# Patient Record
Sex: FEMALE | Race: WHITE | NOT HISPANIC OR LATINO | Employment: FULL TIME | URBAN - METROPOLITAN AREA
[De-identification: names, ages, dates, MRNs, and addresses within clinical notes are randomized per-mention and may not be internally consistent; named-entity substitution may affect disease eponyms.]

---

## 2017-05-17 ENCOUNTER — OFFICE VISIT (OUTPATIENT)
Dept: FAMILY MEDICINE | Facility: CLINIC | Age: 37
End: 2017-05-17
Payer: COMMERCIAL

## 2017-05-17 VITALS
WEIGHT: 173 LBS | HEART RATE: 78 BPM | TEMPERATURE: 98.5 F | HEIGHT: 67 IN | OXYGEN SATURATION: 98 % | BODY MASS INDEX: 27.15 KG/M2 | DIASTOLIC BLOOD PRESSURE: 74 MMHG | SYSTOLIC BLOOD PRESSURE: 108 MMHG

## 2017-05-17 DIAGNOSIS — R07.81 RIB PAIN ON LEFT SIDE: Primary | ICD-10-CM

## 2017-05-17 PROCEDURE — 99213 OFFICE O/P EST LOW 20 MIN: CPT | Performed by: PHYSICIAN ASSISTANT

## 2017-05-17 NOTE — NURSING NOTE
"Chief Complaint   Patient presents with     Rib Pain       Initial /74 (BP Location: Right arm, Cuff Size: Adult Regular)  Pulse 78  Temp 98.5  F (36.9  C) (Oral)  Ht 5' 7\" (1.702 m)  Wt 173 lb (78.5 kg)  SpO2 98%  Breastfeeding? No  BMI 27.1 kg/m2 Estimated body mass index is 27.1 kg/(m^2) as calculated from the following:    Height as of this encounter: 5' 7\" (1.702 m).    Weight as of this encounter: 173 lb (78.5 kg).  Medication Reconciliation: complete    "

## 2017-05-17 NOTE — PATIENT INSTRUCTIONS
Unclear if this is just a flare from previous versus new injury given period of wellness for 6 months.  Given recurrence, let's have you RTC for CPE for preventative health labs.  Will also complete additional labs for calcium, vitamin D and TSH at follow-up.    Electronically Signed By: Carmen King PA-C

## 2017-05-17 NOTE — PROGRESS NOTES
SUBJECTIVE:                                                    Julisa Tillman is a 37 year old female who presents to clinic today for the following health issues:      Concern - re-injured left rib; same spot from 1 yr ago and same feeling. Had rib fx at that time.  Working out and loosing weight. Was doing an UE weight lifting move and began to hurt again after this.   Did have period where she felt totally well for 6 months since last time rib bothered her.  Pain is not constant. At it's worst yesterday and rates this a 7/10.  Was traveling in care for work and feels that jostling in care made it worse.  Main concern is why is this happening again to the same spot.  Does admit she is lactose intolerant so doesn't have a lot of dairy. No multi-vitamin.  Hasn't had calcium or vitamin D levels checked in past.  Daughter was born without a thyroid and pt reports she was encouraged to have this checked, but she hasn't.       Onset: re-injured it Monday night    Description:   History of rib pain on left side and she re-injured it on monday    Intensity: moderate    Progression of Symptoms:  Worsening as the day goes on    Accompanying Signs & Symptoms:  -no swelling or bruising on that side       Previous history of similar problem:       Precipitating factors:   Worsened by: lying on it during the night    Alleviating factors:  Improved by: time       Therapies Tried and outcome: advil      Problem list and histories reviewed & adjusted, as indicated.  Additional history: as documented    Patient Active Problem List   Diagnosis     Smoker     Moderate recurrent major depression (H)     History reviewed. No pertinent surgical history.    Social History   Substance Use Topics     Smoking status: Current Some Day Smoker     Smokeless tobacco: Not on file     Alcohol use No     History reviewed. No pertinent family history.      Current Outpatient Prescriptions   Medication Sig Dispense Refill     escitalopram  "(LEXAPRO) 20 MG tablet Take 1 tablet (20 mg) by mouth daily 90 tablet 1     No Known Allergies    Reviewed and updated as needed this visit by clinical staff       Reviewed and updated as needed this visit by Provider         ROS:  Constitutional, HEENT, cardiovascular, pulmonary, MSK, integumentary systems are negative, except as otherwise noted.    OBJECTIVE:                                                    /74 (BP Location: Right arm, Cuff Size: Adult Regular)  Pulse 78  Temp 98.5  F (36.9  C) (Oral)  Ht 5' 7\" (1.702 m)  Wt 173 lb (78.5 kg)  SpO2 98%  Breastfeeding? No  BMI 27.1 kg/m2  Body mass index is 27.1 kg/(m^2).  GENERAL: healthy, alert and no distress  EYES: Eyes grossly normal to inspection, PERRL and conjunctivae and sclerae normal  RESP: lungs clear to auscultation - no rales, rhonchi or wheezes  CV: regular rate and rhythm, normal S1 S2, no S3 or S4, no murmur, click or rub, no peripheral edema and peripheral pulses strong  MS: TTP of anterior lateral rib in region of 7th rib consistent with where prior rib fx was noted. Also is at junction of costocartilage and rib.     Diagnostic Test Results:  none      ASSESSMENT/PLAN:                                                        ICD-10-CM    1. Rib pain on left side R07.81    ? New injury versus recurrent costo-chondritis.  No evidence for osteopenia on prior x-ray and was well-healed for 6 months.  Given recurrence though advised CPE and labs for further investigation.  Can also consider ortho referral should this continue to be an issue in the future.  Pt amenable to plan.  See Patient Instructions  Patient Instructions   Unclear if this is just a flare from previous versus new injury given period of wellness for 6 months.  Given recurrence, let's have you RTC for CPE for preventative health labs.  Will also complete additional labs for calcium, vitamin D and TSH at follow-up.    Electronically Signed By: Carmen King, " [place]

## 2017-05-17 NOTE — MR AVS SNAPSHOT
"              After Visit Summary   5/17/2017    Julisa Tillman    MRN: [id number]           Patient Information     Date Of Birth          1980        Visit Information        Provider Department      5/17/2017 3:00 PM Carmen King PA-C [facility]        Care Instructions    Unclear if this is just a flare from previous versus new injury given period of wellness for 6 months.  Given recurrence, let's have you RTC for CPE for preventative health labs.  Will also complete additional labs for calcium, vitamin D and TSH at follow-up.    Electronically Signed By: Carmen King PA-C          Follow-ups after your visit        Who to contact     If you have questions or need follow up information about today's clinic visit or your schedule please contact [facility] directly at [phone].  Normal or non-critical lab and imaging results will be communicated to you by [employer], letter or phone within 4 business days after the clinic has received the results. If you do not hear from us within 7 days, please contact the clinic through [employer] or phone. If you have a critical or abnormal lab result, we will notify you by phone as soon as possible.  Submit refill requests through [employer] or call your pharmacy and they will forward the refill request to us. Please allow 3 business days for your refill to be completed.          Additional Information About Your Visit        MyChart Information     [employer] lets you send messages to your doctor, view your test results, renew your prescriptions, schedule appointments and more. To sign up, go to [email or web address] . Click on \"Log in\" on the left side of the screen, which will take you to the Welcome page. Then click on \"Sign up Now\" on the right side of the page.     You will be asked to enter the access code listed below, as well as some personal information. Please follow the directions to create your username and " "password.     Your access code is: [email or web address]  Expires: 8/15/2017  3:35 PM     Your access code will  in 90 days. If you need help or a new code, please call your [place] clinic or [phone].        Care EveryWhere ID     This is your Care EveryWhere ID. This could be used by other organizations to access your [place] medical records  [id number]        Your Vitals Were     Pulse Temperature Height Pulse Oximetry Breastfeeding? BMI (Body Mass Index)    78 98.5  F (36.9  C) (Oral) 5' 7\" (1.702 m) 98% No 27.1 kg/m2       Blood Pressure from Last 3 Encounters:   17 108/74   16 114/72   16 120/72    Weight from Last 3 Encounters:   17 173 lb (78.5 kg)   16 181 lb (82.1 kg)   16 181 lb (82.1 kg)              Today, you had the following     No orders found for display       Primary Care Provider    [facility]       No address on file        Thank you!     Thank you for choosing [facility]  for your care. Our goal is always to provide you with excellent care. Hearing back from our patients is one way we can continue to improve our services. Please take a few minutes to complete the written survey that you may receive in the mail after your visit with us. Thank you!             Your Updated Medication List - Protect others around you: Learn how to safely use, store and throw away your medicines at www.disposemymeds.org.          This list is accurate as of: 17  3:35 PM.  Always use your most recent med list.                   Brand Name Dispense Instructions for use    escitalopram 20 MG tablet    LEXAPRO    90 tablet    Take 1 tablet (20 mg) by mouth daily         "

## 2017-05-22 ENCOUNTER — OFFICE VISIT (OUTPATIENT)
Dept: ORTHOPEDICS | Facility: CLINIC | Age: 37
End: 2017-05-22
Payer: COMMERCIAL

## 2017-05-22 ENCOUNTER — TELEPHONE (OUTPATIENT)
Dept: FAMILY MEDICINE | Facility: CLINIC | Age: 37
End: 2017-05-22

## 2017-05-22 VITALS
DIASTOLIC BLOOD PRESSURE: 78 MMHG | WEIGHT: 173 LBS | SYSTOLIC BLOOD PRESSURE: 134 MMHG | HEIGHT: 67 IN | BODY MASS INDEX: 27.15 KG/M2

## 2017-05-22 DIAGNOSIS — R07.81 RIB PAIN ON LEFT SIDE: Primary | ICD-10-CM

## 2017-05-22 DIAGNOSIS — M94.0 COSTOCHONDRITIS: Primary | ICD-10-CM

## 2017-05-22 DIAGNOSIS — R07.89 LEFT-SIDED CHEST WALL PAIN: ICD-10-CM

## 2017-05-22 PROCEDURE — 99203 OFFICE O/P NEW LOW 30 MIN: CPT | Performed by: PHYSICAL MEDICINE & REHABILITATION

## 2017-05-22 NOTE — PATIENT INSTRUCTIONS
We addressed the following today:    1. Costochondritis  2. Left-sided chest wall pain    Activity modification as discussed  Physical therapy: [facility] - [phone]  Topical Treatments: Ice  Over the counter medication: Acetaminophen (Tylenol) 1000 mg every 6 hours with food (Maximum of 3000 mg/day)  Ibuprofen (Advil) 800 mg three times a day with food for two weeks  Follow up in 4 weeks if no improvement of symptoms for further evaluation/medical care (sooner if needed; call direct clinic number [[phone]] at any time with questions or concerns)

## 2017-05-22 NOTE — PROGRESS NOTES
" [facility]   Clinic Visit s May 22, 2017    Subjective:  Julisa Tillman is a 37 year old female who is seen in consultation at the request of Carmen King PA-C for evaluation of acute on chronic left anterior rib/chest wall pain.    Symptoms began 1 week ago.  Reports insidious onset without acute precipitating event.  Reports aching, sharp, and pressure that is located left anterior chest wall region with radiation present.  Pain is 9/10 in maximal severity and 7/10 currently.  Symptoms are generally worse with twisting/driving activities, with coughing activities, and with sudden movements and better with laying flat on her back.  Other treatment has consisted of Ibuprofen with minimal relief.  Denies any numbness/tingling/weakness of the upper extremities. Denies any shortness of breath or associated skin changes.  History of fracture to the left 6th rib 1 year ago from coughing.    Patient's past medical, surgical, social, and family histories are reviewed today.  Significant medical history as noted above  No past medical history on file.    Review of Systems:  Constitutional: NEGATIVE for fever, chills, or change in weight  Skin: NEGATIVE for worrisome rashes, moles, or lesions  Neuro: NEGATIVE for weakness of the upper extremities  MSK: see HPI  Additional 10 point ROS is negative other than symptoms noted above and in HPI    Objective:  /78  Ht 5' 7\" (1.702 m)  Wt 173 lb (78.5 kg)  BMI 27.1 kg/m2  General: healthy, alert, and in no distress  Skin: no suspicious lesions or rashes  Psych: mentation appears normal and affect normal/bright  HEENT: no scleral icterus  CV: no pedal edema  Resp: normal respiratory effort without conversational dyspnea   Neuro: motor strength as noted below  Lymph: no palpable lymphadenopathy    MSK:    THORACIC SPINE  Inspection:    No redness, swelling, overlying skin change, or gross deformity/asymmetry  Palpation:    Tender about the " anterior chest wall region  Strength:    5/5 - upper extremities  Special Tests:    None    Imaging:  No x-rays indicated during today's visit  Previous films were reviewed today, independent visualization of images was performed, and results were discussed with the patient  XR RIBS & CHEST LT - 11/14/2016  IMPRESSION:   1. Callus formation in the left sixth rib consistent with an old fracture.   2. No pneumothorax.   3. Chest and left ribs are otherwise normal.    ASSESSMENT:  1. Costochondritis  2. Left-sided chest wall pain    PLAN:  1. Formal physical therapy - exercises to include shoulder mobilization and unrestricted shoulder/pectoral stretching activities with progression to resisted strengthening activities with use of modalities as needed with with home exercise prescription.  2. Activity modification as discussed, including limitation of activities that cause pain/discomfort.  3. Ibuprofen 800 mg 3 times/day for 2 weeks with food for improvement of pain/discomfort.  4. Acetaminophen/ice as needed for improved pain control.  5. Follow-up in 4 weeks if no improvement of symptoms for further evaluation/medical care.  If symptoms resolve completely, can follow-up as needed.  Consider MRI of the chest without contrast, corticosteroid injection with ultrasound guidance, intercostal nerve block, etc as deemed appropriate moving forward. Instructed to contact our office should the condition evolve or worsen, or should any new/progressive neurologic symptoms develop.    Patient's conditions were thoroughly discussed during today's visit with greater than 50% of the visit spent counseling the patient with total time spent face-to-face with the patient being 20 minutes.    Jarrett Dobson DO, CAM  [facility]    Disclaimer: This note consists of symbols derived from keyboarding, dictation and/or voice recognition software. As a result, there may be errors in the script that have gone undetected.  Please consider this when interpreting information found in this chart.

## 2017-05-22 NOTE — MR AVS SNAPSHOT
After Visit Summary   5/22/2017    Julisa Tillman    MRN: [id number]           Patient Information     Date Of Birth          1980        Visit Information        Provider Department      5/22/2017 5:20 PM Jarrett Dobson,  [facility]        Today's Diagnoses     Costochondritis    -  1    Left-sided chest wall pain          Care Instructions    We addressed the following today:    1. Costochondritis  2. Left-sided chest wall pain    Activity modification as discussed  Physical therapy: [facility] - [phone]  Topical Treatments: Ice  Over the counter medication: Acetaminophen (Tylenol) 1000 mg every 6 hours with food (Maximum of 3000 mg/day)  Ibuprofen (Advil) 800 mg three times a day with food for two weeks  Follow up in 4 weeks if no improvement of symptoms for further evaluation/medical care (sooner if needed; call direct clinic number [[phone]] at any time with questions or concerns)            Follow-ups after your visit        Who to contact     If you have questions or need follow up information about today's clinic visit or your schedule please contact [facility] directly at [phone].  Normal or non-critical lab and imaging results will be communicated to you by [email or web address], letter or phone within 4 business days after the clinic has received the results. If you do not hear from us within 7 days, please contact the clinic through [email or web address] or phone. If you have a critical or abnormal lab result, we will notify you by phone as soon as possible.  Submit refill requests through [employer] or call your pharmacy and they will forward the refill request to us. Please allow 3 business days for your refill to be completed.          Additional Information About Your Visit        MyChart Information     [employer] lets you send messages to your doctor, view your test results, renew your prescriptions, schedule appointments and more.  "To sign up, go to [email or web address] . Click on \"Log in\" on the left side of the screen, which will take you to the Welcome page. Then click on \"Sign up Now\" on the right side of the page.     You will be asked to enter the access code listed below, as well as some personal information. Please follow the directions to create your username and password.     Your access code is: [email or web address]  Expires: 8/15/2017  3:35 PM     Your access code will  in 90 days. If you need help or a new code, please call your [place] clinic or [phone].        Care EveryWhere ID     This is your Care EveryWhere ID. This could be used by other organizations to access your [place] medical records  [id number]        Your Vitals Were     Height BMI (Body Mass Index)                5' 7\" (1.702 m) 27.1 kg/m2           Blood Pressure from Last 3 Encounters:   17 134/78   17 108/74   16 114/72    Weight from Last 3 Encounters:   17 173 lb (78.5 kg)   17 173 lb (78.5 kg)   16 181 lb (82.1 kg)              Today, you had the following     No orders found for display       Primary Care Provider    [facility]       No address on file        Thank you!     Thank you for choosing [facility] [place]  for your care. Our goal is always to provide you with excellent care. Hearing back from our patients is one way we can continue to improve our services. Please take a few minutes to complete the written survey that you may receive in the mail after your visit with us. Thank you!             Your Updated Medication List - Protect others around you: Learn how to safely use, store and throw away your medicines at www.disposemymeds.org.          This list is accurate as of: 17  6:05 PM.  Always use your most recent med list.                   Brand Name Dispense Instructions for use    escitalopram 20 MG tablet    LEXAPRO    90 tablet    Take 1 tablet (20 mg) by mouth " daily

## 2017-05-22 NOTE — TELEPHONE ENCOUNTER
In agreement with instructions provided below. Given recurrence and feeling that she is worsening would advise consult with ortho for additional opinion/definitive management. Please notify that referral was placed and they will contact her to schedule an appt.  Electronically Signed By: Carmen King PA-C

## 2017-05-22 NOTE — NURSING NOTE
"Chief Complaint   Patient presents with     Musculoskeletal Problem     L anterior rib and sternum pain       Initial /78  Ht 5' 7\" (1.702 m)  Wt 173 lb (78.5 kg)  BMI 27.1 kg/m2 Estimated body mass index is 27.1 kg/(m^2) as calculated from the following:    Height as of this encounter: 5' 7\" (1.702 m).    Weight as of this encounter: 173 lb (78.5 kg).  Medication Reconciliation: complete     Michel Bello, ATC    "

## 2017-05-22 NOTE — TELEPHONE ENCOUNTER
Spoke with Julisa, I informed her of below and also gave her direct ortho  number as she wants to schedule as soon as possible. .Rosa Maria Eaton R.N.

## 2017-05-22 NOTE — TELEPHONE ENCOUNTER
Calling to today is still having recurrent rib pain, interfering with work and sleep. Says it definitely feels worse than it was when she was seen last week OV 5/17/17. Says she did not re-injure, but she did go to a different class at the gym. Says she was in the car an hour over the weekend and it really hurt during the car ride. She thinks it is from the jostling around in the car. Advised per Carmen's note next step is most likely ortho referral. Will route to Carmen King PA-C to advise if there is further management she would like.     Julisa is taking Advil. Asked if Tylenol may work better. I informed her she could try and see if one gives her better relief than the other or informed of how to alternate.       Okay to leave detailed message [phone]. Rosa Maria Eaton R.N.

## 2017-05-22 NOTE — Clinical Note
Dear Julisa Briscoe saw me at [facility] on May 22, 2017.  Please refer to the visit note at your convenience and feel free to contact me should you have any questions.  Sincerely,  Jarrett Dobson DO, [facility]

## 2017-06-22 ENCOUNTER — HOSPITAL ENCOUNTER (EMERGENCY)
Facility: CLINIC | Age: 37
Discharge: HOME | End: 2017-06-22
Attending: PSYCHIATRY & NEUROLOGY | Admitting: PSYCHIATRY & NEUROLOGY
Payer: COMMERCIAL

## 2017-06-22 VITALS
BODY MASS INDEX: 25.06 KG/M2 | RESPIRATION RATE: 16 BRPM | SYSTOLIC BLOOD PRESSURE: 118 MMHG | OXYGEN SATURATION: 97 % | DIASTOLIC BLOOD PRESSURE: 74 MMHG | HEART RATE: 72 BPM | WEIGHT: 160 LBS | TEMPERATURE: 96.8 F

## 2017-06-22 DIAGNOSIS — F41.9 ANXIETY: ICD-10-CM

## 2017-06-22 DIAGNOSIS — F33.1 MODERATE RECURRENT MAJOR DEPRESSION (H): ICD-10-CM

## 2017-06-22 LAB
AMPHETAMINES UR QL SCN: ABNORMAL
BARBITURATES UR QL: ABNORMAL
BENZODIAZ UR QL: ABNORMAL
CANNABINOIDS UR QL SCN: ABNORMAL
COCAINE UR QL: ABNORMAL
ETHANOL UR QL SCN: ABNORMAL
HCG UR QL: NEGATIVE
OPIATES UR QL SCN: ABNORMAL

## 2017-06-22 PROCEDURE — 80307 DRUG TEST PRSMV CHEM ANLYZR: CPT | Performed by: PSYCHIATRY & NEUROLOGY

## 2017-06-22 PROCEDURE — 99284 EMERGENCY DEPT VISIT MOD MDM: CPT | Mod: Z6 | Performed by: PSYCHIATRY & NEUROLOGY

## 2017-06-22 PROCEDURE — 80320 DRUG SCREEN QUANTALCOHOLS: CPT | Performed by: PSYCHIATRY & NEUROLOGY

## 2017-06-22 PROCEDURE — 81025 URINE PREGNANCY TEST: CPT | Performed by: PSYCHIATRY & NEUROLOGY

## 2017-06-22 PROCEDURE — 90791 PSYCH DIAGNOSTIC EVALUATION: CPT

## 2017-06-22 PROCEDURE — 99285 EMERGENCY DEPT VISIT HI MDM: CPT | Mod: 25

## 2017-06-22 RX ORDER — PROPRANOLOL HYDROCHLORIDE 20 MG/1
20 TABLET ORAL 3 TIMES DAILY PRN
Qty: 60 TABLET | Refills: 1 | Status: SHIPPED | OUTPATIENT
Start: 2017-06-22 | End: 2017-07-17

## 2017-06-22 ASSESSMENT — ENCOUNTER SYMPTOMS
HALLUCINATIONS: 0
ABDOMINAL PAIN: 0
DIZZINESS: 0
DYSPHORIC MOOD: 1
BACK PAIN: 0
CHEST TIGHTNESS: 0
FEVER: 0
NERVOUS/ANXIOUS: 1
SHORTNESS OF BREATH: 0

## 2017-06-22 NOTE — DISCHARGE INSTRUCTIONS
Start propranolol 20 mg up to three times a day as needed for anxiety    Follow up with your primary provider for medication management    Follow up with therapy on Friday 6/23/17 at 10 am

## 2017-06-22 NOTE — ED NOTES
Pt expressed concern over access to her chart as she is a [employer] employee.  Registration will provide pt with info on getting Protected Chart status.

## 2017-06-22 NOTE — ED PROVIDER NOTES
History     Chief Complaint   Patient presents with     Panic Attack     was at work and had panic attack; crying, vomited, hands clenched up. Reports a lot of stress with work and at home lately.     The history is provided by the patient, medical records, the spouse and a friend.     Julisa Tillman is a 37 year old female who comes in due to her worsening anxiety especially today.  She had a panic attack today at work due to her being told she should maybe look for another position better suited to her talents.  She has been under a lot of stress due to work.  She also has a history of depression and anxiety.  She last had bad anxiety 5 years ago.  She moved to [place] due to having some friends here and getting her job which was better pay than her old job.  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  She gets anxious and then will start to feel frozen, hands cramping up and not being able to move.  This then makes her more anxious.  She is on lexapro.  She smokes marijuana most nights but it has started to make her paranoid.  She thinks she can stop it easily.  She has some passive wishes of not waking up in the am but does not want to die or kill herself.  She admits she is very stressed and is struggling.      Please see the 's assessment in [facility] from today for further details.    I have reviewed the Medications, Allergies, Past Medical and Surgical History, and Social History in the Epic system.    Review of Systems   Constitutional: Negative for fever.   Eyes: Negative for visual disturbance.   Respiratory: Negative for chest tightness and shortness of breath.    Cardiovascular: Negative for chest pain.   Gastrointestinal: Negative for abdominal pain.   Musculoskeletal: Negative for back pain.   Neurological: Negative for dizziness.   Psychiatric/Behavioral: Positive for dysphoric mood. Negative for hallucinations, self-injury and suicidal ideas. The  patient is nervous/anxious.    All other systems reviewed and are negative.      Physical Exam   BP: 120/78  Pulse: 77  Temp: 98.1  F (36.7  C)  Resp: 16  Weight: 72.6 kg (160 lb)  SpO2: 96 %  Physical Exam   Constitutional: She is oriented to person, place, and time. She appears well-developed and well-nourished.   HENT:   Head: Normocephalic and atraumatic.   Mouth/Throat: Oropharynx is clear and moist.   Eyes: Pupils are equal, round, and reactive to light.   Neck: Normal range of motion. Neck supple.   Cardiovascular: Normal rate, regular rhythm and normal heart sounds.    Pulmonary/Chest: Effort normal and breath sounds normal.   Abdominal: Soft. Bowel sounds are normal.   Musculoskeletal: Normal range of motion.   Neurological: She is alert and oriented to person, place, and time.   Skin: Skin is warm and dry.   Psychiatric: Her speech is normal and behavior is normal. Judgment and thought content normal. Her mood appears anxious. She is not actively hallucinating. Thought content is not paranoid and not delusional. Cognition and memory are normal. She exhibits a depressed mood. She expresses no homicidal and no suicidal ideation. She expresses no suicidal plans and no homicidal plans.   Julisa is a 38 y/o female who looks her age.  She is well groomed with good eye contact.   Nursing note and vitals reviewed.      ED Course     ED Course     Procedures                 Labs Ordered and Resulted from Time of ED Arrival Up to the Time of Departure from the ED   DRUG ABUSE SCREEN 6 CHEM DEP URINE ([facility]) - Abnormal; Notable for the following:        Result Value    Cannabinoids Qual Urine   (*)     Value: Positive   Cutoff for a positive cannabinoid is greater than 50 ng/mL. This is an   unconfirmed screening result to be used for medical purposes only.      All other components within normal limits   HCG QUALITATIVE URINE            Assessments & Plan (with Medical Decision Making)   Julisa will be  discharged home.  She is not an imminent risk to herself or others.  She will be started on propranolol 20 mg tid prn for her anxiety/panic symptoms.  She will follow up with his primary provider for medication management.  She will be set up with therapy via [facility] on 6/23/17 (tomorrow).      I have reviewed the nursing notes.    I have reviewed the findings, diagnosis, plan and need for follow up with the patient.    New Prescriptions    PROPRANOLOL (INDERAL) 20 MG TABLET    Take 1 tablet (20 mg) by mouth 3 times daily as needed       Final diagnoses:   Moderate recurrent major depression (H)   Anxiety       6/22/2017   [facility], [place], EMERGENCY DEPARTMENT     Omar Xie MD  06/22/17 [id number]

## 2017-06-22 NOTE — ED AVS SNAPSHOT
[facility], [place], Emergency Department    [address]    [facility] [address]    Phone:  [phone]    Fax:  [phone]                                       Julisa Tillman   MRN: [id number]    Department:  [facility], Emergency Department   Date of Visit:  6/22/2017           After Visit Summary Signature Page     I have received my discharge instructions, and my questions have been answered. I have discussed any challenges I see with this plan with the nurse or doctor.    ..........................................................................................................................................  Patient/Patient Representative Signature      ..........................................................................................................................................  Patient Representative Print Name and Relationship to Patient    ..................................................               ................................................  Date                                            Time    ..........................................................................................................................................  Reviewed by Signature/Title    ...................................................              ..............................................  Date                                                            Time

## 2017-06-22 NOTE — ED AVS SNAPSHOT
[facility], Emergency Department    [address]    [place] [address]    Phone:  [phone]    Fax:  [phone]                                       Julisa Tillman   MRN: [id number]    Department:  [facility], Emergency Department   Date of Visit:  6/22/2017           Patient Information     Date Of Birth          1980        Your diagnoses for this visit were:     Moderate recurrent major depression (H)     Anxiety        You were seen by Omar Xie MD.        Discharge Instructions       Start propranolol 20 mg up to three times a day as needed for anxiety    Follow up with your primary provider for medication management    Follow up with therapy on Friday 6/23/17 at 10 am    24 Hour Appointment Hotline       To make an appointment at any [facility] clinic, call [phone] ([phone]). If you don't have a family doctor or clinic, we will help you find one. [facility] clinics are conveniently located to serve the needs of you and your family.             Review of your medicines      START taking        Dose / Directions Last dose taken    propranolol 20 MG tablet   Commonly known as:  INDERAL   Dose:  20 mg   Quantity:  60 tablet        Take 1 tablet (20 mg) by mouth 3 times daily as needed   Refills:  1          Our records show that you are taking the medicines listed below. If these are incorrect, please call your family doctor or clinic.        Dose / Directions Last dose taken    escitalopram 20 MG tablet   Commonly known as:  LEXAPRO   Dose:  20 mg   Quantity:  90 tablet        Take 1 tablet (20 mg) by mouth daily   Refills:  1                Prescriptions were sent or printed at these locations (1 Prescription)                   Other Prescriptions                Printed at Department/Unit printer (1 of 1)         propranolol (INDERAL) 20 MG tablet                Procedures and tests performed during your visit     Drug abuse screen 6 urine (tox)    HCG qualitative  "urine      Orders Needing Specimen Collection     None      Pending Results     No orders found from 2017 to 2017.            Pending Culture Results     No orders found from 2017 to 2017.            Pending Results Instructions     If you had any lab results that were not finalized at the time of your Discharge, you can call the ED Lab Result RN at [phone]. You will be contacted by this team for any positive Lab results or changes in treatment. The nurses are available 7 days a week from 10A to 6:30P.  You can leave a message 24 hours per day and they will return your call.        Thank you for choosing [facility]       Thank you for choosing [facility] for your care. Our goal is always to provide you with excellent care. Hearing back from our patients is one way we can continue to improve our services. Please take a few minutes to complete the written survey that you may receive in the mail after you visit with us. Thank you!        [employer] Information     [employer] lets you send messages to your doctor, view your test results, renew your prescriptions, schedule appointments and more. To sign up, go to [email or web address] . Click on \"Log in\" on the left side of the screen, which will take you to the Welcome page. Then click on \"Sign up Now\" on the right side of the page.     You will be asked to enter the access code listed below, as well as some personal information. Please follow the directions to create your username and password.     Your access code is: [email or web address]  Expires: 8/15/2017  3:35 PM     Your access code will  in 90 days. If you need help or a new code, please call your [facility] clinic or [phone].        Care EveryWhere ID     This is your Care EveryWhere ID. This could be used by other organizations to access your [facility] medical records  [id number]        Equal Access to Services     EBONIE AVENDANO: romulo Rand qaybta " rafaela luke ah. So [facility] [phone].    ATENCIÓN: Si habla español, tiene a melgar disposición servicios gratuitos de asistencia lingüística. Llame al [phone].    We comply with applicable federal civil rights laws and [place] laws. We do not discriminate on the basis of race, color, national origin, age, disability sex, sexual orientation or gender identity.            After Visit Summary       This is your record. Keep this with you and show to your community pharmacist(s) and doctor(s) at your next visit.

## 2017-06-29 DIAGNOSIS — F33.1 MODERATE RECURRENT MAJOR DEPRESSION (H): ICD-10-CM

## 2017-06-29 NOTE — TELEPHONE ENCOUNTER
Pt due for a PHQ-9 and ADRIANA-7     Unable to call due to long distance is not working     Please call pt once lines are back up     Manjula Castañeda RN, BSN  [facility]

## 2017-06-29 NOTE — TELEPHONE ENCOUNTER
ESCITALOPRAM 20MG TABLETS       Last Written Prescription Date: 12/12/16  Last Fill Quantity: 90, # refills: 1  Last Office Visit with [facility] primary care provider:  05/17/17        Last PHQ-9 score on record=   PHQ-9 SCORE 12/12/2016   Total Score 6

## 2017-07-11 DIAGNOSIS — F33.1 MODERATE RECURRENT MAJOR DEPRESSION (H): ICD-10-CM

## 2017-07-12 NOTE — TELEPHONE ENCOUNTER
escitalopram (LEXAPRO) 20 MG tablet     Last Written Prescription Date: 12/12/2016  Last Fill Quantity: 90 tablet, # refills: 1  Last Office Visit with [facility] primary care provider:  5/17/2017        Last PHQ-9 score on record=   PHQ-9 SCORE 12/12/2016   Total Score 6

## 2017-07-12 NOTE — TELEPHONE ENCOUNTER
Pt was due for follow up in June.    Routing refill request to provider for review/approval because:  Labs out of range:  PHQ 9  Patient needs to be seen because:  Overdue for follow up.      Will route to TC to call pt to schedule follow up Rosa Maria Eaton R.N.

## 2017-07-14 RX ORDER — ESCITALOPRAM OXALATE 20 MG/1
TABLET ORAL
Qty: 90 TABLET | Refills: 0 | Status: SHIPPED | OUTPATIENT
Start: 2017-07-14 | End: 2017-07-17

## 2017-07-14 NOTE — TELEPHONE ENCOUNTER
Signed since she has an appt. Will plan to see her and re-fill med further at that time if indicated.  Electronically Signed By: Carmen King PA-C

## 2017-07-14 NOTE — TELEPHONE ENCOUNTER
Called pt and made her an appt for Monday for med check.  She does need a refill before the weekend though please.  Please advise  Yael Bagley

## 2017-07-17 ENCOUNTER — OFFICE VISIT (OUTPATIENT)
Dept: FAMILY MEDICINE | Facility: CLINIC | Age: 37
End: 2017-07-17
Payer: COMMERCIAL

## 2017-07-17 VITALS
TEMPERATURE: 98 F | OXYGEN SATURATION: 98 % | HEART RATE: 66 BPM | DIASTOLIC BLOOD PRESSURE: 66 MMHG | WEIGHT: 159 LBS | BODY MASS INDEX: 24.96 KG/M2 | SYSTOLIC BLOOD PRESSURE: 102 MMHG | HEIGHT: 67 IN

## 2017-07-17 DIAGNOSIS — F33.1 MODERATE RECURRENT MAJOR DEPRESSION (H): ICD-10-CM

## 2017-07-17 DIAGNOSIS — F43.9 STRESS: ICD-10-CM

## 2017-07-17 DIAGNOSIS — F41.0 ANXIETY ATTACK: ICD-10-CM

## 2017-07-17 DIAGNOSIS — F41.9 ANXIETY: Primary | ICD-10-CM

## 2017-07-17 PROCEDURE — 99213 OFFICE O/P EST LOW 20 MIN: CPT | Performed by: PHYSICIAN ASSISTANT

## 2017-07-17 RX ORDER — PROPRANOLOL HYDROCHLORIDE 20 MG/1
20 TABLET ORAL 3 TIMES DAILY PRN
Qty: 60 TABLET | Refills: 1 | Status: SHIPPED | OUTPATIENT
Start: 2017-07-17 | End: 2017-11-15

## 2017-07-17 RX ORDER — ESCITALOPRAM OXALATE 20 MG/1
TABLET ORAL
Qty: 90 TABLET | Refills: 1 | Status: SHIPPED | OUTPATIENT
Start: 2017-07-17 | End: 2018-05-10

## 2017-07-17 ASSESSMENT — ANXIETY QUESTIONNAIRES
5. BEING SO RESTLESS THAT IT IS HARD TO SIT STILL: MORE THAN HALF THE DAYS
1. FEELING NERVOUS, ANXIOUS, OR ON EDGE: MORE THAN HALF THE DAYS
6. BECOMING EASILY ANNOYED OR IRRITABLE: SEVERAL DAYS
7. FEELING AFRAID AS IF SOMETHING AWFUL MIGHT HAPPEN: SEVERAL DAYS
2. NOT BEING ABLE TO STOP OR CONTROL WORRYING: MORE THAN HALF THE DAYS
3. WORRYING TOO MUCH ABOUT DIFFERENT THINGS: MORE THAN HALF THE DAYS
GAD7 TOTAL SCORE: 11

## 2017-07-17 ASSESSMENT — PATIENT HEALTH QUESTIONNAIRE - PHQ9: 5. POOR APPETITE OR OVEREATING: SEVERAL DAYS

## 2017-07-17 NOTE — MR AVS SNAPSHOT
"              After Visit Summary   7/17/2017    Julisa Tillman    MRN: [id number]           Patient Information     Date Of Birth          1980        Visit Information        Provider Department      7/17/2017 4:00 PM Carmen King PA-C [facility]        Today's Diagnoses     Stress    -  1    Moderate recurrent major depression (H)        Anxiety        Anxiety attack          Care Instructions    Ok to continue lexapro without changes.  So glad things are improved with addition of inderal.  Meds re-filled today.  Given additional stressors, let's just check back in again in 1 month and see how things are going.    Electronically Signed By: Carmen King PA-C            Follow-ups after your visit        Who to contact     If you have questions or need follow up information about today's clinic visit or your schedule please contact [facility] [place] directly at [phone].  Normal or non-critical lab and imaging results will be communicated to you by MyChart, letter or phone within 4 business days after the clinic has received the results. If you do not hear from us within 7 days, please contact the clinic through [employer] or phone. If you have a critical or abnormal lab result, we will notify you by phone as soon as possible.  Submit refill requests through [employer] or call your pharmacy and they will forward the refill request to us. Please allow 3 business days for your refill to be completed.          Additional Information About Your Visit        MyChart Information     [employer] lets you send messages to your doctor, view your test results, renew your prescriptions, schedule appointments and more. To sign up, go to [email or web address] . Click on \"Log in\" on the left side of the screen, which will take you to the Welcome page. Then click on \"Sign up Now\" on the right side of the page.     You will be asked to enter the access code listed below, as well as some " "personal information. Please follow the directions to create your username and password.     Your access code is: [email or web address]  Expires: 8/15/2017  3:35 PM     Your access code will  in 90 days. If you need help or a new code, please call your [facility] or [phone].        Care EveryWhere ID     This is your Care EveryWhere ID. This could be used by other organizations to access your [facility] medical records  [id number]        Your Vitals Were     Pulse Temperature Height Last Period Pulse Oximetry BMI (Body Mass Index)    66 98  F (36.7  C) (Oral) 5' 7\" (1.702 m) 2017 98% 24.9 kg/m2       Blood Pressure from Last 3 Encounters:   17 102/66   17 118/74   17 134/78    Weight from Last 3 Encounters:   17 159 lb (72.1 kg)   17 160 lb (72.6 kg)   17 173 lb (78.5 kg)              Today, you had the following     No orders found for display         Today's Medication Changes          These changes are accurate as of: 17  4:37 PM.  If you have any questions, ask your nurse or doctor.               These medicines have changed or have updated prescriptions.        Dose/Directions    escitalopram 20 MG tablet   Commonly known as:  LEXAPRO   This may have changed:  See the new instructions.   Used for:  Moderate recurrent major depression (H)   Changed by:  Carmen King PA-C        TAKE 1 TABLET(20 MG) BY MOUTH DAILY   Quantity:  90 tablet   Refills:  1            Where to get your medicines      These medications were sent to [facility] - [place], [place] - [address] AT [facility] [address]  [address], [place] [address]     Phone:  [phone]     escitalopram 20 MG tablet    propranolol 20 MG tablet                Primary Care Provider Office Phone # Fax #    Carmen King PA-C [phone]       [facility] [place] [address]    [place] [address]        Equal Access to Services     " EBONIE REIS : Hadii aad ku ludwig Au, waaxda luqadaha, qaybta kaalmada albaniashan, rafaela lamine hayclinton bowmanjorge lciro brizuela . So [facility] [phone].    ATENCIÓN: Si habla español, tiene a melgar disposición servicios gratuitos de asistencia lingüística. Llame al [phone].    We comply with applicable federal civil rights laws and [place] laws. We do not discriminate on the basis of race, color, national origin, age, disability sex, sexual orientation or gender identity.            Thank you!     Thank you for choosing [facility]  for your care. Our goal is always to provide you with excellent care. Hearing back from our patients is one way we can continue to improve our services. Please take a few minutes to complete the written survey that you may receive in the mail after your visit with us. Thank you!             Your Updated Medication List - Protect others around you: Learn how to safely use, store and throw away your medicines at www.disposemymeds.org.          This list is accurate as of: 7/17/17  4:37 PM.  Always use your most recent med list.                   Brand Name Dispense Instructions for use Diagnosis    escitalopram 20 MG tablet    LEXAPRO    90 tablet    TAKE 1 TABLET(20 MG) BY MOUTH DAILY    Moderate recurrent major depression (H)       propranolol 20 MG tablet    INDERAL    60 tablet    Take 1 tablet (20 mg) by mouth 3 times daily as needed    Moderate recurrent major depression (H), Anxiety, Anxiety attack

## 2017-07-17 NOTE — NURSING NOTE
"Chief Complaint   Patient presents with     Depression       Initial /66  Pulse 66  Temp 98  F (36.7  C) (Oral)  Ht 5' 7\" (1.702 m)  Wt 159 lb (72.1 kg)  LMP 06/08/2017  SpO2 98%  BMI 24.9 kg/m2 Estimated body mass index is 24.9 kg/(m^2) as calculated from the following:    Height as of this encounter: 5' 7\" (1.702 m).    Weight as of this encounter: 159 lb (72.1 kg).  Medication Reconciliation: complete    "

## 2017-07-17 NOTE — PATIENT INSTRUCTIONS
Ok to continue lexapro without changes.  So glad things are improved with addition of inderal.  Meds re-filled today.  Given additional stressors, let's just check back in again in 1 month and see how things are going.    Electronically Signed By: Carmen King PA-C

## 2017-07-17 NOTE — PROGRESS NOTES
SUBJECTIVE:                                                    Julisa Tillman is a 37 year old female who presents to clinic today for the following health issues:      Depression and Anxiety Follow-Up;   Interval update from last visit pt reports she had a panic attack at work and was in the ED. She wound up being prescribed inderal that she could use 3x daily prn and has found this to be immensely helpful. Didn't like the idea of anything habit forming and knows that other medications can affect her judgement whereas this does not so is glad she has an alternative type of med to take like this.     A lot of social stressors going on - lost a friend and also having some marital problems. Does not elaborate regarding this.  Was recommended to see [facility] for counseling by ED clinician and reports she has been going once per week.  After this week they may decrease sessions once every other week.   Work has been very stressful too.  Does not feel she needs a medication adjustment as depression is stable and felt that most of mood issues are stemming from stress right now.  Only started using the inderal for the past few weeks so would like to see how things go for now.      Status since last visit: feeling good    Other associated symptoms:None    Complicating factors:     Significant life event: No     Current substance abuse: None    PHQ-9 SCORE 12/12/2016 7/17/2017   Total Score 6 9     ADRIANA-7 SCORE 12/12/2016 7/17/2017   Total Score 6 11       PHQ-9  English  PHQ-9   Any Language  GAD7    Amount of exercise or physical activity: 4-5 days/week for an average of 30-45 minutes    Problems taking medications regularly: No    Medication side effects: none    Diet: regular (no restrictions)      Problem list and histories reviewed & adjusted, as indicated.  Additional history: as documented    Patient Active Problem List   Diagnosis     Smoker     Moderate recurrent major depression (H)     Anxiety      "Anxiety attack     History reviewed. No pertinent surgical history.    Social History   Substance Use Topics     Smoking status: Current Some Day Smoker     Smokeless tobacco: Not on file     Alcohol use No     History reviewed. No pertinent family history.      Current Outpatient Prescriptions   Medication Sig Dispense Refill     escitalopram (LEXAPRO) 20 MG tablet TAKE 1 TABLET(20 MG) BY MOUTH DAILY 90 tablet 1     propranolol (INDERAL) 20 MG tablet Take 1 tablet (20 mg) by mouth 3 times daily as needed 60 tablet 1     [DISCONTINUED] escitalopram (LEXAPRO) 20 MG tablet TAKE 1 TABLET(20 MG) BY MOUTH DAILY 90 tablet 0     [DISCONTINUED] propranolol (INDERAL) 20 MG tablet Take 1 tablet (20 mg) by mouth 3 times daily as needed 60 tablet 1     No Known Allergies    Reviewed and updated as needed this visit by clinical staff  Tobacco  Allergies  Meds  Med Hx  Surg Hx  Fam Hx  Soc Hx      Reviewed and updated as needed this visit by Provider  Tobacco  Allergies  Meds  Med Hx  Surg Hx  Fam Hx  Soc Hx          ROS:  Constitutional, HEENT, cardiovascular, pulmonary, gi and gu systems are negative, except as otherwise noted.    OBJECTIVE:     /66  Pulse 66  Temp 98  F (36.7  C) (Oral)  Ht 5' 7\" (1.702 m)  Wt 159 lb (72.1 kg)  LMP 06/08/2017  SpO2 98%  BMI 24.9 kg/m2  Body mass index is 24.9 kg/(m^2).  GENERAL: healthy, alert and no distress  RESP: lungs clear to auscultation - no rales, rhonchi or wheezes  CV: regular rate and rhythm, no murmur, click or rub.    Diagnostic Test Results:  See flowsheets for PHQ/ADRIANA scores.    ASSESSMENT/PLAN:       ICD-10-CM    1. Anxiety F41.9 propranolol (INDERAL) 20 MG tablet   2. Moderate recurrent major depression (H) F33.1 escitalopram (LEXAPRO) 20 MG tablet     propranolol (INDERAL) 20 MG tablet   3. Stress F43.9    4. Anxiety attack F41.0 propranolol (INDERAL) 20 MG tablet   stable from ED visit and feels things have improved since initiation of " inderal.  Social stressors with report of marital problems and stess at work likely contributing, but pt would like to continue care with counseling prior to any further dose adjustment with lexapro at this time.  Is amenable to close follow-up with re-check again in 1 month.  See Patient Instructions  Patient Instructions   Ok to continue lexapro without changes.  So glad things are improved with addition of inderal.  Meds re-filled today.  Given additional stressors, let's just check back in again in 1 month and see how things are going.    Electronically Signed By: Carmen King PA-C

## 2017-07-18 ASSESSMENT — ANXIETY QUESTIONNAIRES: GAD7 TOTAL SCORE: 11

## 2017-07-18 ASSESSMENT — PATIENT HEALTH QUESTIONNAIRE - PHQ9: SUM OF ALL RESPONSES TO PHQ QUESTIONS 1-9: 9

## 2017-07-24 RX ORDER — ESCITALOPRAM OXALATE 20 MG/1
TABLET ORAL
Qty: 90 TABLET | Refills: 0 | OUTPATIENT
Start: 2017-07-24

## 2017-08-08 ENCOUNTER — OFFICE VISIT (OUTPATIENT)
Dept: FAMILY MEDICINE | Facility: CLINIC | Age: 37
End: 2017-08-08
Payer: COMMERCIAL

## 2017-08-08 VITALS
OXYGEN SATURATION: 99 % | DIASTOLIC BLOOD PRESSURE: 64 MMHG | TEMPERATURE: 97.9 F | SYSTOLIC BLOOD PRESSURE: 104 MMHG | WEIGHT: 158 LBS | HEIGHT: 67 IN | BODY MASS INDEX: 24.8 KG/M2 | HEART RATE: 63 BPM

## 2017-08-08 DIAGNOSIS — J06.9 UPPER RESPIRATORY TRACT INFECTION, UNSPECIFIED TYPE: Primary | ICD-10-CM

## 2017-08-08 DIAGNOSIS — F17.200 SMOKER: ICD-10-CM

## 2017-08-08 DIAGNOSIS — J20.9 ACUTE BRONCHITIS WITH SYMPTOMS > 10 DAYS: ICD-10-CM

## 2017-08-08 PROCEDURE — 99213 OFFICE O/P EST LOW 20 MIN: CPT | Performed by: PHYSICIAN ASSISTANT

## 2017-08-08 RX ORDER — AZITHROMYCIN 250 MG/1
TABLET, FILM COATED ORAL
Qty: 6 TABLET | Refills: 0 | Status: SHIPPED | OUTPATIENT
Start: 2017-08-08 | End: 2018-01-10

## 2017-08-08 NOTE — PROGRESS NOTES
SUBJECTIVE:                                                    Julisa Tillman is a 37 year old female who presents to clinic today for the following health issues:      Acute Illness   Acute illness concerns: cough, congestion, headache  Thought it was allergies so tried zyrtec, but didn't really make a difference.  Now having some maxillary sinus pressure.  Both side.  No teeth pain or fevers.  No hx of asthma or pneumonia.  Smoker.  No recent abx use.  Will be leaving to travel out of town next week.    Onset: started about two weeks ago    Fever: no    Chills/Sweats: no    Headache (location?): YES    Sinus Pressure:YES    Conjunctivitis:  no    Ear Pain: YES    Rhinorrhea: YES    Congestion: YES    Sore Throat: no     Cough: YES - a little mucous.     Wheeze: no    Decreased Appetite: no    Nausea: no    Vomiting: no    Diarrhea:  no    Dysuria/Freq.: no    Fatigue/Achiness: YES    Sick/Strep Exposure: no     Therapies Tried and outcome: zyrtec      Problem list and histories reviewed & adjusted, as indicated.  Additional history: as documented    Patient Active Problem List   Diagnosis     Smoker     Moderate recurrent major depression (H)     Anxiety     Anxiety attack     History reviewed. No pertinent surgical history.    Social History   Substance Use Topics     Smoking status: Current Some Day Smoker     Smokeless tobacco: Never Used     Alcohol use No     History reviewed. No pertinent family history.      Current Outpatient Prescriptions   Medication Sig Dispense Refill     escitalopram (LEXAPRO) 20 MG tablet TAKE 1 TABLET(20 MG) BY MOUTH DAILY 90 tablet 1     propranolol (INDERAL) 20 MG tablet Take 1 tablet (20 mg) by mouth 3 times daily as needed 60 tablet 1     No Known Allergies      Reviewed and updated as needed this visit by clinical staff     Reviewed and updated as needed this visit by Provider           ROS:  Constitutional, HEENT, cardiovascular, pulmonary, gi and gu systems are negative,  "except as otherwise noted.      OBJECTIVE:   /64  Pulse 63  Temp 97.9  F (36.6  C) (Oral)  Ht 5' 7\" (1.702 m)  Wt 158 lb (71.7 kg)  SpO2 99%  Breastfeeding? No  BMI 24.75 kg/m2  Body mass index is 24.75 kg/(m^2).  GENERAL: healthy, alert and no distress  EYES: Eyes grossly normal to inspection, PERRL and conjunctivae and sclerae normal  HENT: ear canals and TM's normal, nose and mouth without ulcers or lesions  NECK: no adenopathy, no asymmetry, masses, or scars and thyroid normal to palpation  RESP: lungs clear to auscultation - no rales, rhonchi or wheezes  CV: regular rate and rhythm, normal S1 S2, no S3 or S4, no murmur, click or rub, no peripheral edema and peripheral pulses strong    Diagnostic Test Results:  none     ASSESSMENT/PLAN:       ICD-10-CM    1. Upper respiratory tract infection, unspecified type J06.9    2. Acute bronchitis with symptoms > 10 days J20.9 azithromycin (ZITHROMAX) 250 MG tablet   3. Smoker F17.200    Abx risks reviewed with pt as well.  Pt also overdue for physical and will follow-up for completion of this.  See Patient Instructions  Patient Instructions   Given duration, smoking history and going out of town did discuss abx therapy and pt wishes to pursue if not improving.  Otherwise clear breath sounds and reassuring exam today.  Discussed potential that this could a viral process as well and may just need a little more time.  Supportive measures reviewed as well.    Electronically Signed By: Carmen King PA-C        "

## 2017-08-08 NOTE — MR AVS SNAPSHOT
"              After Visit Summary   8/8/2017    Julisa Tillman    MRN: [id number]           Patient Information     Date Of Birth          1980        Visit Information        Provider Department      8/8/2017 2:40 PM Carmen King PA-C [facility]        Today's Diagnoses     Upper respiratory tract infection, unspecified type    -  1    Acute bronchitis with symptoms > 10 days        Smoker          Care Instructions    Given duration, smoking history and going out of town did discuss abx therapy and pt wishes to pursue if not improving.  Otherwise clear breath sounds and reassuring exam today.  Discussed potential that this could a viral process as well and may just need a little more time.  Supportive measures reviewed as well.    Electronically Signed By: Carmen King PA-C            Follow-ups after your visit        Who to contact     If you have questions or need follow up information about today's clinic visit or your schedule please contact [facility] [place] directly at [phone].  Normal or non-critical lab and imaging results will be communicated to you by [employer], letter or phone within 4 business days after the clinic has received the results. If you do not hear from us within 7 days, please contact the clinic through Stevia Firsthart or phone. If you have a critical or abnormal lab result, we will notify you by phone as soon as possible.  Submit refill requests through [employer] or call your pharmacy and they will forward the refill request to us. Please allow 3 business days for your refill to be completed.          Additional Information About Your Visit        Stevia Firsthart Information     [employer] lets you send messages to your doctor, view your test results, renew your prescriptions, schedule appointments and more. To sign up, go to [email or web address] . Click on \"Log in\" on the left side of the screen, which will take you to the Welcome page. Then click on \"Sign " "up Now\" on the right side of the page.     You will be asked to enter the access code listed below, as well as some personal information. Please follow the directions to create your username and password.     Your access code is: [email or web address]  Expires: 8/15/2017  3:35 PM     Your access code will  in 90 days. If you need help or a new code, please call your [facility] or [phone].        Care EveryWhere ID     This is your Care EveryWhere ID. This could be used by other organizations to access your [facility] medical records  [id number]        Your Vitals Were     Pulse Temperature Height Pulse Oximetry Breastfeeding? BMI (Body Mass Index)    63 97.9  F (36.6  C) (Oral) 5' 7\" (1.702 m) 99% No 24.75 kg/m2       Blood Pressure from Last 3 Encounters:   17 104/64   17 102/66   17 118/74    Weight from Last 3 Encounters:   17 158 lb (71.7 kg)   17 159 lb (72.1 kg)   17 160 lb (72.6 kg)              Today, you had the following     No orders found for display         Today's Medication Changes          These changes are accurate as of: 17  3:00 PM.  If you have any questions, ask your nurse or doctor.               Start taking these medicines.        Dose/Directions    azithromycin 250 MG tablet   Commonly known as:  ZITHROMAX   Used for:  Acute bronchitis with symptoms > 10 days   Started by:  Carmen King PA-C        Two tablets first day, then one tablet daily for four days.   Quantity:  6 tablet   Refills:  0            Where to get your medicines      These medications were sent to [facility] - [place], [place] - [address] [facility]  [address] [address]     Phone:  [phone]     azithromycin 250 MG tablet                Primary Care Provider Office Phone # Fax #    Carmen King PA-C [phone]       [facility] [place] [address]    [place] [address]      "   Equal Access to Services     [facility] : Hadii aad ku hadorvilleadam Yukony, [place] luqadaha, qaybta kastephrafaela crawford. So [facility] [phone].    ATENCIÓN: Si habla español, tiene a melgar disposición servicios gratuitos de asistencia lingüística. Llame al [phone].    We comply with applicable federal civil rights laws and [place] laws. We do not discriminate on the basis of race, color, national origin, age, disability sex, sexual orientation or gender identity.            Thank you!     Thank you for choosing [facility]  [facility] your [facility]. Our goal is always to provide you with excellent care. Hearing back from our patients is one way we can continue to improve our services. Please take a few minutes to complete the written survey that you may receive in the mail after your visit with us. Thank you!             Your Updated Medication List - Protect others around you: Learn how to safely use, store and throw away your medicines at www.disposemymeds.org.          This list is accurate as of: 8/8/17  3:00 PM.  Always use your most recent med list.                   Brand Name Dispense Instructions for use Diagnosis    azithromycin 250 MG tablet    ZITHROMAX    6 tablet    Two tablets first day, then one tablet daily for four days.    Acute bronchitis with symptoms > 10 days       escitalopram 20 MG tablet    LEXAPRO    90 tablet    TAKE 1 TABLET(20 MG) BY MOUTH DAILY    Moderate recurrent major depression (H)       propranolol 20 MG tablet    INDERAL    60 tablet    Take 1 tablet (20 mg) by mouth 3 times daily as needed    Moderate recurrent major depression (H), Anxiety, Anxiety attack

## 2017-08-08 NOTE — NURSING NOTE
"Chief Complaint   Patient presents with     Sinus Problem     Cough       Initial /64  Pulse 63  Temp 97.9  F (36.6  C) (Oral)  Ht 5' 7\" (1.702 m)  Wt 158 lb (71.7 kg)  SpO2 99%  Breastfeeding? No  BMI 24.75 kg/m2 Estimated body mass index is 24.75 kg/(m^2) as calculated from the following:    Height as of this encounter: 5' 7\" (1.702 m).    Weight as of this encounter: 158 lb (71.7 kg).  Medication Reconciliation: complete    "

## 2017-08-08 NOTE — PATIENT INSTRUCTIONS
Given duration, smoking history and going out of town did discuss abx therapy and pt wishes to pursue if not improving.  Otherwise clear breath sounds and reassuring exam today.  Discussed potential that this could a viral process as well and may just need a little more time.  Supportive measures reviewed as well.    Electronically Signed By: Carmen King PA-C

## 2017-11-15 DIAGNOSIS — F41.9 ANXIETY: ICD-10-CM

## 2017-11-15 DIAGNOSIS — F41.0 ANXIETY ATTACK: ICD-10-CM

## 2017-11-15 DIAGNOSIS — F33.1 MODERATE RECURRENT MAJOR DEPRESSION (H): ICD-10-CM

## 2017-11-15 NOTE — TELEPHONE ENCOUNTER
Requested Prescriptions   Pending Prescriptions Disp Refills     propranolol (INDERAL) 20 MG tablet [Pharmacy Med Name: PROPRANOLOL 20MG TABLETS]  Last Written Prescription Date:  7/17/2017  Last Fill Quantity: 60 tablet,  # refills: 1   Last Office Visit with [facility], [facility] or [facility] prescribing provider:  8/8/2017   Future Office Visit:    Next 5 appointments (look out 90 days)     Nov 20, 2017  1:40 PM CST   Office Visit with Carmen King PA-C   [facility] [place] [facility]    [address] [id number] [phone]                    60 tablet 0     Sig: TAKE 1 TABLET(20 MG) BY MOUTH THREE TIMES DAILY AS NEEDED    Beta-Blockers Protocol Passed    11/15/2017  9:22 AM       Passed - Blood pressure under 140/90    BP Readings from Last 3 Encounters:   08/08/17 104/64   07/17/17 102/66   06/22/17 118/74                Passed - Patient is age 6 or older       Passed - Recent or future visit with authorizing provider's specialty    Patient had office visit in the last year or has a visit in the next 30 days with authorizing provider.  See chart review.

## 2017-11-16 RX ORDER — PROPRANOLOL HYDROCHLORIDE 20 MG/1
TABLET ORAL
Qty: 60 TABLET | Refills: 0 | Status: SHIPPED | OUTPATIENT
Start: 2017-11-16 | End: 2018-01-04

## 2017-11-16 NOTE — TELEPHONE ENCOUNTER
Prescription approved per [facility] Refill Protocol.  Feli Lan, RN, BSN  [facility]

## 2018-01-04 DIAGNOSIS — F33.1 MODERATE RECURRENT MAJOR DEPRESSION (H): ICD-10-CM

## 2018-01-04 DIAGNOSIS — F41.0 ANXIETY ATTACK: ICD-10-CM

## 2018-01-04 DIAGNOSIS — F41.9 ANXIETY: ICD-10-CM

## 2018-01-04 RX ORDER — PROPRANOLOL HYDROCHLORIDE 20 MG/1
TABLET ORAL
Qty: 90 TABLET | Refills: 0 | Status: SHIPPED | OUTPATIENT
Start: 2018-01-04 | End: 2018-02-04

## 2018-01-04 NOTE — TELEPHONE ENCOUNTER
Requested Prescriptions   Pending Prescriptions Disp Refills     propranolol (INDERAL) 20 MG tablet [Pharmacy Med Name: PROPRANOLOL 20MG TABLETS]  Last Written Prescription Date:  11/16/17  Last Fill Quantity: 60,  # refills: 0   Last Office Visit with [facility], [facility] or [facility] prescribing provider:  8/8/17   Future Office Visit:      60 tablet 0     Sig: TAKE 1 TABLET(20 MG) BY MOUTH THREE TIMES DAILY AS NEEDED    Beta-Blockers Protocol Passed    1/4/2018  1:44 PM       Passed - Blood pressure under 140/90    BP Readings from Last 3 Encounters:   08/08/17 104/64   07/17/17 102/66   06/22/17 118/74                Passed - Patient is age 6 or older       Passed - Recent or future visit with authorizing provider's specialty    Patient had office visit in the last year or has a visit in the next 30 days with authorizing provider.  See chart review.                 Prescription approved per [facility] Refill Protocol.  Feli Lan, RN, BSN  [facility]

## 2018-01-07 ENCOUNTER — HEALTH MAINTENANCE LETTER (OUTPATIENT)
Age: 38
End: 2018-01-07

## 2018-01-10 ENCOUNTER — OFFICE VISIT (OUTPATIENT)
Dept: FAMILY MEDICINE | Facility: CLINIC | Age: 38
End: 2018-01-10
Payer: COMMERCIAL

## 2018-01-10 VITALS
HEIGHT: 67 IN | SYSTOLIC BLOOD PRESSURE: 108 MMHG | TEMPERATURE: 97.9 F | WEIGHT: 145 LBS | OXYGEN SATURATION: 99 % | BODY MASS INDEX: 22.76 KG/M2 | DIASTOLIC BLOOD PRESSURE: 62 MMHG | HEART RATE: 88 BPM

## 2018-01-10 DIAGNOSIS — B96.89 BACTERIAL VAGINOSIS: ICD-10-CM

## 2018-01-10 DIAGNOSIS — Z11.3 SCREEN FOR STD (SEXUALLY TRANSMITTED DISEASE): ICD-10-CM

## 2018-01-10 DIAGNOSIS — N39.3 FEMALE STRESS INCONTINENCE: ICD-10-CM

## 2018-01-10 DIAGNOSIS — N76.0 BACTERIAL VAGINOSIS: ICD-10-CM

## 2018-01-10 DIAGNOSIS — N89.8 VAGINAL ITCHING: Primary | ICD-10-CM

## 2018-01-10 LAB
SPECIMEN SOURCE: ABNORMAL
WET PREP SPEC: ABNORMAL

## 2018-01-10 PROCEDURE — 87591 N.GONORRHOEAE DNA AMP PROB: CPT | Performed by: PHYSICIAN ASSISTANT

## 2018-01-10 PROCEDURE — 99213 OFFICE O/P EST LOW 20 MIN: CPT | Performed by: PHYSICIAN ASSISTANT

## 2018-01-10 PROCEDURE — 87491 CHLMYD TRACH DNA AMP PROBE: CPT | Performed by: PHYSICIAN ASSISTANT

## 2018-01-10 PROCEDURE — 87210 SMEAR WET MOUNT SALINE/INK: CPT | Performed by: PHYSICIAN ASSISTANT

## 2018-01-10 RX ORDER — METRONIDAZOLE 500 MG/1
500 TABLET ORAL 2 TIMES DAILY
Qty: 14 TABLET | Refills: 0 | Status: SHIPPED | OUTPATIENT
Start: 2018-01-10 | End: 2018-10-26

## 2018-01-10 NOTE — MR AVS SNAPSHOT
"              After Visit Summary   1/10/2018    Julisa Tillman    MRN: [id number]           Patient Information     Date Of Birth          1980        Visit Information        Provider Department      1/10/2018 2:20 PM Carmen King PA-C [facility] [place]        Today's Diagnoses     Vaginal itching    -  1    Female stress incontinence        Screen for STD (sexually transmitted disease)        Bacterial vaginosis          Care Instructions    Negative for yeast.  Will treat for BV given wet prep.  Metronidazole as directed.  Will notify of GC screening when results available.    Electronically Signed By: Carmen King PA-C            Follow-ups after your visit        Additional Services     PHYSICAL THERAPY REFERRAL       *This therapy referral will be filtered to a centralized scheduling office at [facility] and the patient will receive a call to schedule an appointment at a [place] location most convenient for them. *     [facility] provides Physical Therapy evaluation and treatment and many specialty services across the [place] system.  If requesting a specialty program, please choose from the list below.    If you have not heard from the scheduling office within 2 business days, please call [phone] for all locations, with the exception of [place], please call [phone].  Treatment: Evaluation & Treatment  Special Instructions/Modalities: per therapist  Special Programs: Incontinence Pelvic Floor Program    Please be aware that coverage of these services is subject to the terms and limitations of your health insurance plan.  Call member services at your health plan with any benefit or coverage questions.      **Note to Provider:  If you are referring outside of [place] for the therapy appointment, please list the name of the location in the \"special instructions\" above, print the referral and give to the patient to schedule the " "appointment.                  Who to contact     If you have questions or need follow up information about today's clinic visit or your schedule please contact [facility] [place] directly at [phone].  Normal or non-critical lab and imaging results will be communicated to you by MyChart, letter or phone within 4 business days after the clinic has received the results. If you do not hear from us within 7 days, please contact the clinic through MyChart or phone. If you have a critical or abnormal lab result, we will notify you by phone as soon as possible.  Submit refill requests through [employer] or call your pharmacy and they will forward the refill request to us. Please allow 3 business days for your refill to be completed.          Additional Information About Your Visit        [facility] [facility] Information     [employer] lets you send messages to your doctor, view your test results, renew your prescriptions, schedule appointments and more. To sign up, go to [email or web address] . Click on \"Log in\" on the left side of the screen, which will take you to the Welcome page. Then click on \"Sign up Now\" on the right side of the page.     You will be asked to enter the access code listed below, as well as some personal information. Please follow the directions to create your username and password.     Your access code is: [email or web address]  Expires: 4/10/2018  3:00 PM     Your access code will  in 90 days. If you need help or a new code, please call your [facility] clinic or [phone].        Care EveryWhere ID     This is your Care EveryWhere ID. This could be used by other organizations to access your [facility] medical records  [id number]        Your Vitals Were     Pulse Temperature Height Pulse Oximetry Breastfeeding? BMI (Body Mass Index)    88 97.9  F (36.6  C) (Oral) 5' 7\" (1.702 m) 99% No 22.71 kg/m2       Blood Pressure from Last 3 Encounters:   01/10/18 108/62   17 104/64   17 102/66    Weight from " Last 3 Encounters:   01/10/18 145 lb (65.8 kg)   08/08/17 158 lb (71.7 kg)   07/17/17 159 lb (72.1 kg)              We Performed the Following     Chlamydia trachomatis PCR     Neisseria gonorrhoeae PCR     PHYSICAL THERAPY REFERRAL     Wet prep          Today's Medication Changes          These changes are accurate as of: 1/10/18  3:00 PM.  If you have any questions, ask your nurse or doctor.               Start taking these medicines.        Dose/Directions    metroNIDAZOLE 500 MG tablet   Commonly known as:  FLAGYL   Used for:  Bacterial vaginosis   Started by:  Carmen King PA-C        Dose:  500 mg   Take 1 tablet (500 mg) by mouth 2 times daily   Quantity:  14 tablet   Refills:  0         Stop taking these medicines if you haven't already. Please contact your care team if you have questions.     azithromycin 250 MG tablet   Commonly known as:  ZITHROMAX   Stopped by:  Carmen King PA-C                Where to get your medicines      [facility]  [place], [place] - [address] AT [facility] [address]  [address] [place] [address]     Phone:  [phone]     metroNIDAZOLE 500 MG tablet                Primary Care Provider Office Phone # Fax #    Carmen King PA-C [phone] [address]   [place] [address]        Equal Access to Services     [facility] AH: Hadii aad ku hadasho Soomaali, waaxda luqadaha, qaybta kaalmada adeegyada, rafaela brizuela ah. So [facility] [phone].    ATENCIÓN: Si habla español, tiene a melgar disposición servicios gratuitos de asistencia lingüística. Llame al [phone].    We comply with applicable federal civil rights laws and [place] laws. We do not discriminate on the basis of race, color, national origin, age, disability, sex, sexual orientation, or gender identity.            Thank you!     Thank you for choosing [facility] [place]   for your care. Our goal is always to provide you with excellent care. Hearing back from our patients is one way we can continue to improve our services. Please take a few minutes to complete the written survey that you may receive in the mail after your visit with us. Thank you!             Your Updated Medication List - Protect others around you: Learn how to safely use, store and throw away your medicines at www.[employer].org.          This list is accurate as of: 1/10/18  3:00 PM.  Always use your most recent med list.                   Brand Name Dispense Instructions for use Diagnosis    escitalopram 20 MG tablet    LEXAPRO    90 tablet    TAKE 1 TABLET(20 MG) BY MOUTH DAILY    Moderate recurrent major depression (H)       metroNIDAZOLE 500 MG tablet    FLAGYL    14 tablet    Take 1 tablet (500 mg) by mouth 2 times daily    Bacterial vaginosis       propranolol 20 MG tablet    INDERAL    90 tablet    TAKE 1 TABLET(20 MG) BY MOUTH THREE TIMES DAILY AS NEEDED    Moderate recurrent major depression (H), Anxiety, Anxiety attack

## 2018-01-10 NOTE — PROGRESS NOTES
SUBJECTIVE:   Julisa Tillman is a 37 year old female who presents to clinic today for the following health issues:      Vaginal Symptoms  Onset: started Friday    Description:  Vaginal Discharge: yes, clear watery and white. Not necessarily cottage cheese.   Itching (Pruritis): YES  Burning sensation:  YES  Odor: no     Accompanying Signs & Symptoms:  Pain with Urination: no   Abdominal Pain: no   Fever: no     History:   Sexually active: YES - does mention that she and   for a period of time. Oral sex was performed on her with a new partner. Denies any lesions sores or other concerns. Would be amenable to GC screening as not sure if her  had a new partner. Declines blood borne pathogen screening.   New Partner: no   Possibility of Pregnancy:  No    Precipitating factors:   Recent Antibiotic Use: no     Alleviating factors:      Therapies Tried and outcome: Tried [facility] brand over the counter treatment and it didn't work.  Is 5 days into it and not getting any better.    Does mention wears scented pads for stress urinary incontinence. Doing pilates and this has seemed to help.  Denies any UTI sx including dysuria or hematuria. Would rather avoid any surgery, but discussed pelvic floor rehab with PT and she would like to pursue this.    Problem list and histories reviewed & adjusted, as indicated.  Additional history: as documented    Patient Active Problem List   Diagnosis     Smoker     Moderate recurrent major depression (H)     Anxiety     Anxiety attack     History reviewed. No pertinent surgical history.    Social History   Substance Use Topics     Smoking status: Current Some Day Smoker     Smokeless tobacco: Never Used     Alcohol use No     History reviewed. No pertinent family history.      Current Outpatient Prescriptions   Medication Sig Dispense Refill     propranolol (INDERAL) 20 MG tablet TAKE 1 TABLET(20 MG) BY MOUTH THREE TIMES DAILY AS NEEDED 90 tablet 0     escitalopram  "(LEXAPRO) 20 MG tablet TAKE 1 TABLET(20 MG) BY MOUTH DAILY 90 tablet 1     No Known Allergies      Reviewed and updated as needed this visit by clinical staff     Reviewed and updated as needed this visit by Provider         ROS:  Constitutional, HEENT, cardiovascular, pulmonary, gi and gu systems are negative, except as otherwise noted.      OBJECTIVE:   /62  Pulse 88  Temp 97.9  F (36.6  C) (Oral)  Ht 5' 7\" (1.702 m)  Wt 145 lb (65.8 kg)  SpO2 99%  Breastfeeding? No  BMI 22.71 kg/m2  Body mass index is 22.71 kg/(m^2).  GENERAL: healthy, alert and no distress   (female): refused by pt as would like to complete own self-collect.    Diagnostic Test Results:  Results for orders placed or performed in visit on 01/10/18   Wet prep   Result Value Ref Range    Specimen Description Vagina     Wet Prep No Trichomonas seen     Wet Prep Clue cells seen (A)     Wet Prep No yeast seen    Chlamydia trachomatis PCR   Result Value Ref Range    Specimen Description Vagina     Chlamydia Trachomatis PCR Negative NEG^Negative   Neisseria gonorrhoeae PCR   Result Value Ref Range    Specimen Descrip Vagina     N Gonorrhea PCR Negative NEG^Negative       ASSESSMENT/PLAN:       ICD-10-CM    1. Vaginal itching L29.8 Wet prep     Chlamydia trachomatis PCR     Neisseria gonorrhoeae PCR   2. Female stress incontinence N39.3 PHYSICAL THERAPY REFERRAL   3. Screen for STD (sexually transmitted disease) Z11.3 Chlamydia trachomatis PCR     Neisseria gonorrhoeae PCR   4. Bacterial vaginosis N76.0 metroNIDAZOLE (FLAGYL) 500 MG tablet    B96.89    See Patient Instructions  Given report of period of separation from  we also discussed STD screening and pt would like to pursue this. She declines any blood borne pathogen screening.  Also reviewed hx of intermittent stress incontinence - advised to avoid use of scented pads as can contribute to vaginal irritation. Pt has no UTI sx so UA not completed, but would like to pursue " referral to PT for pelvic floor rehab.  Patient Instructions   Negative for yeast.  Will treat for BV given wet prep.  Metronidazole as directed.  Will notify of GC screening when results available.    Electronically Signed By: Carmen King PA-C

## 2018-01-10 NOTE — PATIENT INSTRUCTIONS
Negative for yeast.  Will treat for BV given wet prep.  Metronidazole as directed.  Will notify of GC screening when results available.    Electronically Signed By: Carmen King PA-C

## 2018-01-10 NOTE — NURSING NOTE
"Chief Complaint   Patient presents with     Vaginal Problem       Initial /62  Pulse 88  Temp 97.9  F (36.6  C) (Oral)  Ht 5' 7\" (1.702 m)  Wt 145 lb (65.8 kg)  SpO2 99%  Breastfeeding? No  BMI 22.71 kg/m2 Estimated body mass index is 22.71 kg/(m^2) as calculated from the following:    Height as of this encounter: 5' 7\" (1.702 m).    Weight as of this encounter: 145 lb (65.8 kg).  Medication Reconciliation: complete    "

## 2018-01-10 NOTE — LETTER
January 15, 2018      Julisa Tillman  [address] [facility] [address]        Dear ,    We are writing to inform you of your test results.    -Chlamydia and gonnohrea tests are normal.    Resulted Orders   Wet prep   Result Value Ref Range    Specimen Description Vagina     Wet Prep No Trichomonas seen     Wet Prep Clue cells seen (A)     Wet Prep No yeast seen    Chlamydia trachomatis PCR   Result Value Ref Range    Specimen Description Vagina     Chlamydia Trachomatis PCR Negative NEG^Negative      Comment:      Negative for C. trachomatis rRNA by transcription mediated amplification.  A negative result by transcription mediated amplification does not preclude   the presence of C. trachomatis infection because results are dependent on   proper and adequate collection, absence of inhibitors, and sufficient rRNA to   be detected.     Neisseria gonorrhoeae PCR   Result Value Ref Range    Specimen Descrip Vagina     N Gonorrhea PCR Negative NEG^Negative      Comment:      Negative for N. gonorrhoeae rRNA by transcription mediated amplification.  A negative result by transcription mediated amplification does not preclude   the presence of N. gonorrhoeae infection because results are dependent on   proper and adequate collection, absence of inhibitors, and sufficient rRNA to   be detected.         If you have any questions or concerns, please call the clinic at the number listed above.       Sincerely,        Carmen King PA-C

## 2018-01-11 LAB
C TRACH DNA SPEC QL NAA+PROBE: NEGATIVE
N GONORRHOEA DNA SPEC QL NAA+PROBE: NEGATIVE
SPECIMEN SOURCE: NORMAL
SPECIMEN SOURCE: NORMAL

## 2018-01-15 NOTE — PROGRESS NOTES
Please call or write patient with the following results:    -Chlamydia and gonnohrea tests are normal.    Electronically Signed By: Carmen King PA-C

## 2018-02-04 DIAGNOSIS — F41.9 ANXIETY: ICD-10-CM

## 2018-02-04 DIAGNOSIS — F33.1 MODERATE RECURRENT MAJOR DEPRESSION (H): ICD-10-CM

## 2018-02-04 DIAGNOSIS — F41.0 ANXIETY ATTACK: ICD-10-CM

## 2018-02-05 RX ORDER — PROPRANOLOL HYDROCHLORIDE 20 MG/1
TABLET ORAL
Qty: 90 TABLET | Refills: 1 | Status: SHIPPED | OUTPATIENT
Start: 2018-02-05 | End: 2018-06-19

## 2018-02-05 NOTE — TELEPHONE ENCOUNTER
Prescription approved per [facility] Refill Protocol.  Feli Lan, RN, BSN  [facility]

## 2018-02-05 NOTE — TELEPHONE ENCOUNTER
"Requested Prescriptions   Pending Prescriptions Disp Refills     propranolol (INDERAL) 20 MG tablet [Pharmacy Med Name: PROPRANOLOL 20MG TABLETS]  Last Written Prescription Date:  1/4/2018  Last Fill Quantity: 90 tablet,  # refills: 0   Last Office Visit with G, P or [facility] prescribing provider:  1/10/2018   Future Office Visit:      90 tablet 0     Sig: TAKE 1 TABLET(20 MG) BY MOUTH THREE TIMES DAILY AS NEEDED    Beta-Blockers Protocol Passed    2/4/2018  5:07 PM       Passed - Blood pressure under 140/90    BP Readings from Last 3 Encounters:   01/10/18 108/62   08/08/17 104/64   07/17/17 102/66          Passed - Patient is age 6 or older       Passed - Recent or future visit with authorizing provider's specialty    Patient had office visit in the last year or has a visit in the next 30 days with authorizing provider.  See \"Patient Info\" tab in inbasket, or \"Choose Columns\" in Meds & Orders section of the refill encounter.             "

## 2018-05-10 DIAGNOSIS — F33.1 MODERATE RECURRENT MAJOR DEPRESSION (H): ICD-10-CM

## 2018-05-11 RX ORDER — ESCITALOPRAM OXALATE 20 MG/1
TABLET ORAL
Qty: 30 TABLET | Refills: 0 | Status: SHIPPED | OUTPATIENT
Start: 2018-05-11 | End: 2018-06-08

## 2018-05-11 NOTE — TELEPHONE ENCOUNTER
Agree, Needs to be seen.  Please have patient make appointment for Annual female exam as she has several HM items due.  Temporary supply given.  OK to follow up with nurse midwife given Carmen's absence for the rest of the month.    Chart reviewed.  Rx sent to pt's preferred pharmacy.    [facility] [address] [facility] [place] - [phone]    Thiago Pennington MD

## 2018-05-11 NOTE — TELEPHONE ENCOUNTER
"Requested Prescriptions   Pending Prescriptions Disp Refills     escitalopram (LEXAPRO) 20 MG tablet [Pharmacy Med Name: ESCITALOPRAM 20MG TABLETS]  Last Written Prescription Date:  7/17/2017  Last Fill Quantity: 90 tablet,  # refills: 1   Last office visit: 1/10/2018 with prescribing provider:  Fernando     Future Office Visit:       90 tablet 0     Sig: TAKE 1 TABLET(20 MG) BY MOUTH DAILY    SSRIs Protocol Failed    5/10/2018  3:21 PM       Failed - PHQ-9 score less than 5 in past 6 months    Please review last PHQ-9 score.     PHQ-9 SCORE 12/12/2016 7/17/2017   Total Score 6 9     ADRIANA-7 SCORE 12/12/2016 7/17/2017   Total Score 6 11          Passed - Patient is age 18 or older       Passed - No active pregnancy on record       Passed - No positive pregnancy test in last 12 months       Passed - Recent (6 mo) or future (30 days) visit within the authorizing provider's specialty    Patient had office visit in the last 6 months or has a visit in the next 30 days with authorizing provider or within the authorizing provider's specialty.  See \"Patient Info\" tab in inbasket, or \"Choose Columns\" in Meds & Orders section of the refill encounter.              "

## 2018-05-11 NOTE — TELEPHONE ENCOUNTER
Chart reviewed, I do not see follow up for appointment on 7/17/2017 when one month follow up requested by Carmen King PA-C. She did see Carmen King PA-C about one month later but was for acute visit.. Due for PHQ 9 and follow up. Will route to  to schedule and to covering pool to review for temporary supply.      Routing refill request to provider for review/approval because:  Labs out of range:  PHQ 9  Labs not current:  PHQ 9    Patient needs to be seen because:  Appears to be due for follow up.     Rosa Maria Eaton R.N.

## 2018-05-14 NOTE — TELEPHONE ENCOUNTER
Patient called and was notified she needs a physical/med followup. She is scheduled for 05/16/2018 at 8:40 a.m. with Dr. Dustin Smith.      Brit Mancuso  Patient Representative

## 2018-05-14 NOTE — TELEPHONE ENCOUNTER
Called [phone] and left non detailed message for pt to call back.  See below.  Needs to be seen for physical and med follow up.  Yael Bagley

## 2018-05-16 ENCOUNTER — OFFICE VISIT (OUTPATIENT)
Dept: FAMILY MEDICINE | Facility: CLINIC | Age: 38
End: 2018-05-16
Payer: COMMERCIAL

## 2018-05-16 VITALS
OXYGEN SATURATION: 98 % | DIASTOLIC BLOOD PRESSURE: 68 MMHG | BODY MASS INDEX: 22.76 KG/M2 | HEIGHT: 67 IN | SYSTOLIC BLOOD PRESSURE: 96 MMHG | WEIGHT: 145 LBS | TEMPERATURE: 98.8 F | HEART RATE: 83 BPM

## 2018-05-16 DIAGNOSIS — Z12.4 SCREENING FOR MALIGNANT NEOPLASM OF CERVIX: ICD-10-CM

## 2018-05-16 DIAGNOSIS — Z11.3 SCREEN FOR STD (SEXUALLY TRANSMITTED DISEASE): Primary | ICD-10-CM

## 2018-05-16 DIAGNOSIS — N92.6 IRREGULAR PERIODS: ICD-10-CM

## 2018-05-16 DIAGNOSIS — N64.4 BREAST PAIN: ICD-10-CM

## 2018-05-16 DIAGNOSIS — N89.8 VAGINAL ITCHING: ICD-10-CM

## 2018-05-16 DIAGNOSIS — Z13.1 SCREENING FOR DIABETES MELLITUS: ICD-10-CM

## 2018-05-16 DIAGNOSIS — Z00.00 ROUTINE GENERAL MEDICAL EXAMINATION AT A HEALTH CARE FACILITY: ICD-10-CM

## 2018-05-16 DIAGNOSIS — Z13.220 SCREENING FOR HYPERLIPIDEMIA: ICD-10-CM

## 2018-05-16 LAB
BASOPHILS # BLD AUTO: 0 10E9/L (ref 0–0.2)
BASOPHILS NFR BLD AUTO: 0.2 %
DIFFERENTIAL METHOD BLD: ABNORMAL
EOSINOPHIL # BLD AUTO: 0.4 10E9/L (ref 0–0.7)
EOSINOPHIL NFR BLD AUTO: 2.7 %
ERYTHROCYTE [DISTWIDTH] IN BLOOD BY AUTOMATED COUNT: 11.8 % (ref 10–15)
HCT VFR BLD AUTO: 40.6 % (ref 35–47)
HGB BLD-MCNC: 14.1 G/DL (ref 11.7–15.7)
LYMPHOCYTES # BLD AUTO: 1.8 10E9/L (ref 0.8–5.3)
LYMPHOCYTES NFR BLD AUTO: 12.9 %
MCH RBC QN AUTO: 32 PG (ref 26.5–33)
MCHC RBC AUTO-ENTMCNC: 34.7 G/DL (ref 31.5–36.5)
MCV RBC AUTO: 92 FL (ref 78–100)
MONOCYTES # BLD AUTO: 0.8 10E9/L (ref 0–1.3)
MONOCYTES NFR BLD AUTO: 5.8 %
NEUTROPHILS # BLD AUTO: 10.9 10E9/L (ref 1.6–8.3)
NEUTROPHILS NFR BLD AUTO: 78.4 %
PLATELET # BLD AUTO: 301 10E9/L (ref 150–450)
RBC # BLD AUTO: 4.4 10E12/L (ref 3.8–5.2)
SPECIMEN SOURCE: NORMAL
WBC # BLD AUTO: 14 10E9/L (ref 4–11)
WET PREP SPEC: NORMAL

## 2018-05-16 PROCEDURE — 87389 HIV-1 AG W/HIV-1&-2 AB AG IA: CPT | Performed by: FAMILY MEDICINE

## 2018-05-16 PROCEDURE — G0145 SCR C/V CYTO,THINLAYER,RESCR: HCPCS | Performed by: FAMILY MEDICINE

## 2018-05-16 PROCEDURE — 85025 COMPLETE CBC W/AUTO DIFF WBC: CPT | Performed by: FAMILY MEDICINE

## 2018-05-16 PROCEDURE — 99396 PREV VISIT EST AGE 40-64: CPT | Performed by: FAMILY MEDICINE

## 2018-05-16 PROCEDURE — 80061 LIPID PANEL: CPT | Performed by: FAMILY MEDICINE

## 2018-05-16 PROCEDURE — 86803 HEPATITIS C AB TEST: CPT | Performed by: FAMILY MEDICINE

## 2018-05-16 PROCEDURE — 87624 HPV HI-RISK TYP POOLED RSLT: CPT | Performed by: FAMILY MEDICINE

## 2018-05-16 PROCEDURE — 87591 N.GONORRHOEAE DNA AMP PROB: CPT | Performed by: FAMILY MEDICINE

## 2018-05-16 PROCEDURE — 36415 COLL VENOUS BLD VENIPUNCTURE: CPT | Performed by: FAMILY MEDICINE

## 2018-05-16 PROCEDURE — 84443 ASSAY THYROID STIM HORMONE: CPT | Performed by: FAMILY MEDICINE

## 2018-05-16 PROCEDURE — 87210 SMEAR WET MOUNT SALINE/INK: CPT | Performed by: FAMILY MEDICINE

## 2018-05-16 PROCEDURE — 80048 BASIC METABOLIC PNL TOTAL CA: CPT | Performed by: FAMILY MEDICINE

## 2018-05-16 PROCEDURE — 86780 TREPONEMA PALLIDUM: CPT | Performed by: FAMILY MEDICINE

## 2018-05-16 PROCEDURE — 87491 CHLMYD TRACH DNA AMP PROBE: CPT | Performed by: FAMILY MEDICINE

## 2018-05-16 PROCEDURE — 99213 OFFICE O/P EST LOW 20 MIN: CPT | Mod: 25 | Performed by: FAMILY MEDICINE

## 2018-05-16 PROCEDURE — 87340 HEPATITIS B SURFACE AG IA: CPT | Performed by: FAMILY MEDICINE

## 2018-05-16 RX ORDER — FLUTICASONE PROPIONATE 50 MCG
1 SPRAY, SUSPENSION (ML) NASAL DAILY
COMMUNITY

## 2018-05-16 RX ORDER — METRONIDAZOLE 500 MG/1
500 TABLET ORAL 2 TIMES DAILY
Qty: 14 TABLET | Refills: 0 | Status: SHIPPED | OUTPATIENT
Start: 2018-05-16 | End: 2018-10-26

## 2018-05-16 ASSESSMENT — ANXIETY QUESTIONNAIRES
1. FEELING NERVOUS, ANXIOUS, OR ON EDGE: NEARLY EVERY DAY
3. WORRYING TOO MUCH ABOUT DIFFERENT THINGS: MORE THAN HALF THE DAYS
6. BECOMING EASILY ANNOYED OR IRRITABLE: NOT AT ALL
IF YOU CHECKED OFF ANY PROBLEMS ON THIS QUESTIONNAIRE, HOW DIFFICULT HAVE THESE PROBLEMS MADE IT FOR YOU TO DO YOUR WORK, TAKE CARE OF THINGS AT HOME, OR GET ALONG WITH OTHER PEOPLE: SOMEWHAT DIFFICULT
2. NOT BEING ABLE TO STOP OR CONTROL WORRYING: SEVERAL DAYS
5. BEING SO RESTLESS THAT IT IS HARD TO SIT STILL: SEVERAL DAYS
7. FEELING AFRAID AS IF SOMETHING AWFUL MIGHT HAPPEN: MORE THAN HALF THE DAYS
GAD7 TOTAL SCORE: 10

## 2018-05-16 ASSESSMENT — PATIENT HEALTH QUESTIONNAIRE - PHQ9: 5. POOR APPETITE OR OVEREATING: SEVERAL DAYS

## 2018-05-16 NOTE — PROGRESS NOTES
SUBJECTIVE:   CC: Julisa Tillman is an 38 year old woman who presents for preventive health visit.     Healthy Habits:    Do you get at least three servings of calcium containing foods daily (dairy, green leafy vegetables, etc.)? yes    Amount of exercise or daily activities, outside of work: 5 day(s) per week, 1 hour    Problems taking medications regularly No    Medication side effects: Yes Pt has mentioned she's had really bad allergies this year, when she takes an allergy medication but it makes her really tired,  When she takes the non drowsy it makes her super nervous, wondering which med she should do since she's tried a couple different ones due to this being the first she's had allergies.     Have you had an eye exam in the past two years? yes    Do you see a dentist twice per year? yes    Do you have sleep apnea, excessive snoring or daytime drowsiness?no    1. Periods have become more irregular -- spotting in between, sometimes heavy, light. Change in timing of periods.  This has been going on for the last 6-9 months. Grandma had endometrial cancer.     2. Breast tenderness: R>L for the past 3 months. Once noticed some clear nipple discharge. No lumps, bumps, or skin changes. No bleeding. Great grandma had breast cancer.     3. Constipation: eats plenty of vegetables but having harder stools. No blood in stool.    4. Vaginal itching: Pt was given Flagyl x 4 months ago for a vaginal symptoms but says she's had symptoms again for x 1 week the itching. Feels just like last time. She would lso like STD testing for reassurance.  had sex addiction.    Patient states she think she had tetanus vaccination within the last 10 years.    Today's PHQ-2 Score:   PHQ-2 ( 1999 Pfizer) 10/12/2016   Q1: Little interest or pleasure in doing things 0   Q2: Feeling down, depressed or hopeless 0   PHQ-2 Score 0       Abuse: Current or Past(Physical, Sexual or Emotional)- No  Do you feel safe in your environment -  "Yes    Social History   Substance Use Topics     Smoking status: Current Some Day Smoker     Smokeless tobacco: Never Used     Alcohol use No     If you drink alcohol do you typically have >3 drinks per day or >7 drinks per week? Not Applicable                     Reviewed orders with patient.  Reviewed health maintenance and updated orders accordingly - Yes  BP Readings from Last 3 Encounters:   05/16/18 96/68   01/10/18 108/62   08/08/17 104/64    Wt Readings from Last 3 Encounters:   05/16/18 145 lb (65.8 kg)   01/10/18 145 lb (65.8 kg)   08/08/17 158 lb (71.7 kg)                    Patient under age 50, mutual decision reflected in health maintenance.      Pertinent mammograms are reviewed under the imaging tab.    History of abnormal Pap smear: NO - age 30-65 PAP every 5 years with negative HPV co-testing recommended    Reviewed and updated as needed this visit by clinical staff  Allergies  Meds         Reviewed and updated as needed this visit by Provider        No past medical history on file.   No past surgical history on file.    ROS:  CONSTITUTIONAL: NEGATIVE for fever, chills, change in weight  INTEGUMENTARU/SKIN: NEGATIVE for worrisome rashes, moles or lesions  EYES: NEGATIVE for vision changes or irritation  ENT: NEGATIVE for ear, mouth and throat problems  RESP: NEGATIVE for significant cough or SOB  BREAST: POSITIVE for breast tenderness  CV: NEGATIVE for chest pain, palpitations or peripheral edema  GI: NEGATIVE for nausea, abdominal pain, heartburn, or change in bowel habits  : NEGATIVE for unusual urinary symptoms. Periods irregular. + vaginal itching.  MUSCULOSKELETAL: NEGATIVE for significant arthralgias or myalgia  NEURO: NEGATIVE for weakness, dizziness or paresthesias  PSYCHIATRIC: NEGATIVE for changes in mood or affect    OBJECTIVE:   BP 96/68 (BP Location: Right arm, Patient Position: Sitting, Cuff Size: Adult Regular)  Pulse 83  Temp 98.8  F (37.1  C) (Oral)  Ht 5' 7\" (1.702 m)  Wt " 145 lb (65.8 kg)  SpO2 98%  BMI 22.71 kg/m2  EXAM:  GENERAL: healthy, alert and no distress  NECK: no adenopathy, no asymmetry, masses, or scars and thyroid normal to palpation  RESP: lungs clear to auscultation - no rales, rhonchi or wheezes  BREAST: normal without masses or nipple discharge and no palpable axillary masses or adenopathy. Bilateral breasts were tender to palpation, R>L  CV: regular rate and rhythm, normal S1 S2, no S3 or S4, no murmur, click or rub, no peripheral edema and peripheral pulses strong  ABDOMEN: soft, nontender, no hepatosplenomegaly, no masses and bowel sounds normal   (female): normal female external genitalia, normal urethral meatus, vaginal mucosa, normal cervix/adnexa/uterus without masses or discharge  MS: no gross musculoskeletal defects noted, no edema    ASSESSMENT/PLAN:       ICD-10-CM    1. Screen for STD (sexually transmitted disease): asymptomatic aside from vaginal itching. No known exposure but concern with husbands history Z11.3 Chlamydia trachomatis PCR     Hepatitis B surface antigen     HIV Antigen Antibody Combo     Wet prep     Neisseria gonorrhoeae PCR     Treponema Abs w Reflex to RPR and Titer     Hepatitis C Screen Reflex to HCV RNA Quant and Genotype     HPV High Risk Types DNA Cervical   2. Routine general medical examination at a health care facility Z00.00    3. Screening for malignant neoplasm of cervix Z12.4 Pap imaged thin layer screen with HPV - recommended age 30 - 65 years (select HPV order below)         4. Screening for diabetes mellitus Z13.1 Basic metabolic panel     CANCELED: GLUCOSE   5. Irregular periods: unclear etiology. Will check CBC and TSH. Also check pelvic ultrasound. N92.6 TSH with free T4 reflex     US Pelvic Complete w Transvaginal     CBC with platelets differential   6. Breast pain N64.4 CANCELED: MA Diagnostic Digital Bilateral   7. Screening for hyperlipidemia Z13.220 Lipid panel reflex to direct LDL Fasting   8. Vaginal  "itching: wet prep negative. Will treat empirically for BV. If symptoms still persistent despite treatment, would repeat wet prep. Could also consider trying steroid ointment if symptoms more external. L29.8 metroNIDAZOLE (FLAGYL) 500 MG tablet       COUNSELING:   Reviewed preventive health counseling, as reflected in patient instructions       Regular exercise       Healthy diet/nutrition       HIV screeninx in teen years, 1x in adult years, and at intervals if high risk         reports that she has been smoking.  She has never used smokeless tobacco.  Tobacco Cessation Action Plan: recommended tobacco cessation -- not a regular smoker.  Estimated body mass index is 22.71 kg/(m^2) as calculated from the following:    Height as of this encounter: 5' 7\" (1.702 m).    Weight as of this encounter: 145 lb (65.8 kg).       Counseling Resources:  ATP IV Guidelines  Pooled Cohorts Equation Calculator  Breast Cancer Risk Calculator  FRAX Risk Assessment  ICSI Preventive Guidelines  Dietary Guidelines for Americans,   USDA's MyPlate  ASA Prophylaxis  Lung CA Screening    Dustin Smith, DO  [facility]  "

## 2018-05-16 NOTE — LETTER
[facility]                    [phone]   May 22, 2018    Julisa Tillman  [facility] [facility] [address]      Dear Julisa,    Here is a summary of your recent test results:    Kidney function is normal (Cr, GFR), Sodium is normal, Potassium is normal, Calcium is normal, Glucose is normal (diabetes screening test).   -LDL(bad) cholesterol level is elevated, HDL(good) cholesterol level is low and your triglycerides are elevated which can increase your heart disease risk.  A diet high in fat and simple carbohydrates, genetics and being overweight can contribute to this. ADVISE: Exercise, a low fat, low carbohydrate diet, weight control, and omega-3 fatty acids (fish oil) 5814-5956 mg daily are helpful to improve this.  Rechecking your fasting cholesterol panel in 12 months is recommended (Lipid w/ LDL reflex, DX: hyperlipidemia)   -TSH (thyroid stimulating hormone) level is normal which indicates normal thyroid function.   -Normal red blood cell (hgb) levels, high white blood cell count and normal platelet levels. ADVISE: elevated white blood cell count often represents infection -- we started treatment for BV but this typically would not cause elevation of white blood cell count. Would recommend rechecking this level in 3 weeks. I will place a future order and please schedule lab only visit to recheck this.   -Hepatitis C antibody screen test shows no signs of a previous hepatitis C infection.   -No signs of bacteria or yeast vaginal infections on wet prep.   -Chlamydia and gonnohrea tests are normal/negative.   -Hepatitis B test was negative   -HIV test was negative   -Treponema (syphilis) test was negative.   -Pap smear was normal. Future pap smear interval to be determined by HPV result which is still pending.     Your test results are enclosed.    Please contact me if you have any questions.            Thank you very much for trusting [facility].      Healthy regards,  Dustin Smith,            Results for orders placed or performed in visit on 05/16/18   Pap imaged thin layer screen with HPV - recommended age 30 - 65 years (select HPV order below)   Result Value Ref Range    PAP NIL     Copath Report         Patient Name: EZEKIEL MOSLEY  MR#: [id number]  Specimen #: [id number]  Collected: 5/16/2018  Received: 5/17/2018  Reported: 5/21/2018 10:43  Ordering Phy(s): DUSTIN SMITH    For improved result formatting, select 'View Enhanced Report Format' under   Linked Documents section.    SPECIMEN/STAIN PROCESS:  Pap imaged thin layer prep screening (Surepath, FocalPoint with guided   screening)       Pap-Cyto x 1, HPV ordered x 1    SOURCE: Cervical, endocervical  ----------------------------------------------------------------   Pap imaged thin layer prep screening (Surepath, FocalPoint with guided   screening)  SPECIMEN ADEQUACY:  Satisfactory for evaluation.  -Transformation zone component absent.    CYTOLOGIC INTERPRETATION:    Negative for intraepithelial lesion or malignancy    Electronically signed out by:  ALAINA Rudolph (ASCP)    Processed and screened at [facility],   [facility]    CLINICAL HISTORY:    Papanicolaou Test Limitatio ns:  Cervical cytology is a screening test with   limited sensitivity; regular  screening is critical for cancer prevention; Pap tests are primarily   effective for the diagnosis/prevention of  squamous cell carcinoma, not adenocarcinomas or other cancers.    TESTING LAB LOCATION:  [facility] [address] [place]  [address] [phone]    COLLECTION SITE:  Client:  [facility]  Location: [facility] ([facility])     Hepatitis B surface antigen   Result Value Ref Range    Hep B Surface Agn Nonreactive NR^Nonreactive   HIV Antigen Antibody Combo   Result Value Ref Range    HIV Antigen Antibody Combo Nonreactive NR^Nonreactive       Treponema  Abs w Reflex to RPR and Titer   Result Value Ref Range    Treponema Antibodies Nonreactive NR^Nonreactive   Hepatitis C Screen Reflex to HCV RNA Quant and Genotype   Result Value Ref Range    Hepatitis C Antibody Nonreactive NR^Nonreactive   TSH with free T4 reflex   Result Value Ref Range    TSH 1.85 0.40 - 4.00 mU/L   Basic metabolic panel   Result Value Ref Range    Sodium 138 133 - 144 mmol/L    Potassium 4.6 3.4 - 5.3 mmol/L    Chloride 107 94 - 109 mmol/L    Carbon Dioxide 24 20 - 32 mmol/L    Anion Gap 7 3 - 14 mmol/L    Glucose 84 70 - 99 mg/dL    Urea Nitrogen 9 7 - 30 mg/dL    Creatinine 0.75 0.52 - 1.04 mg/dL    GFR Estimate 86 >60 mL/min/1.7m2    GFR Estimate If Black >90 >60 mL/min/1.7m2    Calcium 8.5 8.5 - 10.1 mg/dL   Lipid panel reflex to direct LDL Fasting   Result Value Ref Range    Cholesterol 187 <200 mg/dL    Triglycerides 78 <150 mg/dL    HDL Cholesterol 47 (L) >49 mg/dL    LDL Cholesterol Calculated 124 (H) <100 mg/dL    Non HDL Cholesterol 140 (H) <130 mg/dL   CBC with platelets differential   Result Value Ref Range    WBC 14.0 (H) 4.0 - 11.0 10e9/L    RBC Count 4.40 3.8 - 5.2 10e12/L    Hemoglobin 14.1 11.7 - 15.7 g/dL    Hematocrit 40.6 35.0 - 47.0 %    MCV 92 78 - 100 fl    MCH 32.0 26.5 - 33.0 pg    MCHC 34.7 31.5 - 36.5 g/dL    RDW 11.8 10.0 - 15.0 %    Platelet Count 301 150 - 450 10e9/L    Diff Method Automated Method     % Neutrophils 78.4 %    % Lymphocytes 12.9 %    % Monocytes 5.8 %    % Eosinophils 2.7 %    % Basophils 0.2 %    Absolute Neutrophil 10.9 (H) 1.6 - 8.3 10e9/L    Absolute Lymphocytes 1.8 0.8 - 5.3 10e9/L    Absolute Monocytes 0.8 0.0 - 1.3 10e9/L    Absolute Eosinophils 0.4 0.0 - 0.7 10e9/L    Absolute Basophils 0.0 0.0 - 0.2 10e9/L   Chlamydia trachomatis PCR   Result Value Ref Range    Specimen Description Cervix     Chlamydia Trachomatis PCR Negative NEG^Negative   Wet prep   Result Value Ref Range    Specimen Description Vagina     Wet Prep No Trichomonas seen      Wet Prep No clue cells seen     Wet Prep No yeast seen    Neisseria gonorrhoeae PCR   Result Value Ref Range    Specimen Descrip Cervix     N Gonorrhea PCR Negative NEG^Negative

## 2018-05-16 NOTE — MR AVS SNAPSHOT
After Visit Summary   5/16/2018    Julisa Tillman    MRN: [id number]           Patient Information     Date Of Birth          1980        Visit Information        Provider Department      5/16/2018 8:40 AM Dustin Smith, DO [facility] [place]        Today's Diagnoses     Screen for STD (sexually transmitted disease)    -  1    Routine general medical examination at a health care facility        Screening for malignant neoplasm of cervix        Need for prophylactic vaccination with tetanus-diphtheria (TD)        Screening for diabetes mellitus        Irregular periods        Breast pain        Screening for hyperlipidemia        Vaginal itching          Care Instructions      Preventive Health Recommendations  Female Ages 40 to 49    Yearly exam:     See your health care provider every year in order to  1. Review health changes.   2. Discuss preventive care.    3. Review your medicines if your doctor prescribed any.      Get a Pap test every three years (unless you have an abnormal result and your provider advises testing more often).      If you get Pap tests with HPV test, you only need to test every 5 years, unless you have an abnormal result. You do not need a Pap test if your uterus was removed (hysterectomy) and you have not had cancer.      You should be tested each year for STDs (sexually transmitted diseases), if you're at risk.       Ask your doctor if you should have a mammogram.      Have a colonoscopy (test for colon cancer) if someone in your family has had colon cancer or polyps before age 50.       Have a cholesterol test every 5 years.       Have a diabetes test (fasting glucose) after age 45. If you are at risk for diabetes, you should have this test every 3 years.    Shots: Get a flu shot each year. Get a tetanus shot every 10 years.     Nutrition:     Eat at least 5 servings of fruits and vegetables each day.    Eat whole-grain bread, whole-wheat pasta and brown rice  "instead of white grains and rice.    Talk to your provider about Calcium and Vitamin D.     Lifestyle    Exercise at least 150 minutes a week (an average of 30 minutes a day, 5 days a week). This will help you control your weight and prevent disease.    Limit alcohol to one drink per day.    No smoking.     Wear sunscreen to prevent skin cancer.    See your dentist every six months for an exam and cleaning.          Follow-ups after your visit        Follow-up notes from your care team     Return if symptoms worsen or fail to improve.      Future tests that were ordered for you today     Open Future Orders        Priority Expected Expires Ordered    US Pelvic Complete w Transvaginal Routine  5/16/2019 5/16/2018    MA Diagnostic Digital Bilateral Routine  5/16/2019 5/16/2018            Who to contact     If you have questions or need follow up information about today's clinic visit or your schedule please contact [facility] [place] directly at [phone].  Normal or non-critical lab and imaging results will be communicated to you by MyChart, letter or phone within 4 business days after the clinic has received the results. If you do not hear from us within 7 days, please contact the clinic through [employer] or phone. If you have a critical or abnormal lab result, we will notify you by phone as soon as possible.  Submit refill requests through [employer] or call your pharmacy and they will forward the refill request to us. Please allow 3 business days for your refill to be completed.          Additional Information About Your Visit        [employer] Information     [employer] lets you send messages to your doctor, view your test results, renew your prescriptions, schedule appointments and more. To sign up, go to [email or web address] . Click on \"Log in\" on the left side of the screen, which will take you to the Welcome page. Then click on \"Sign up Now\" on the right side of the page.     You will be asked to enter the " "access code listed below, as well as some personal information. Please follow the directions to create your username and password.     Your access code is: [email or web address]  Expires: 2018 10:07 AM     Your access code will  in 90 days. If you need help or a new code, please call your [facility] clinic or [phone].        Care EveryWhere ID     This is your Care EveryWhere ID. This could be used by other organizations to access your [facility] medical records  [id number]        Your Vitals Were     Pulse Temperature Height Pulse Oximetry BMI (Body Mass Index)       83 98.8  F (37.1  C) (Oral) 5' 7\" (1.702 m) 98% 22.71 kg/m2        Blood Pressure from Last 3 Encounters:   18 96/68   01/10/18 108/62   17 104/64    Weight from Last 3 Encounters:   18 145 lb (65.8 kg)   01/10/18 145 lb (65.8 kg)   17 158 lb (71.7 kg)              We Performed the Following     Basic metabolic panel     CBC with platelets differential     Chlamydia trachomatis PCR     Hepatitis B surface antigen     Hepatitis C Screen Reflex to HCV RNA Quant and Genotype     HIV Antigen Antibody Combo     Lipid panel reflex to direct LDL Fasting     Neisseria gonorrhoeae PCR     Pap imaged thin layer screen with HPV - recommended age 30 - 65 years (select HPV order below)     TDAP VACCINE (ADACEL)     Treponema Abs w Reflex to RPR and Titer     TSH with free T4 reflex     Wet prep          Today's Medication Changes          These changes are accurate as of 18 10:07 AM.  If you have any questions, ask your nurse or doctor.               These medicines have changed or have updated prescriptions.        Dose/Directions    * metroNIDAZOLE 500 MG tablet   Commonly known as:  FLAGYL   This may have changed:  Another medication with the same name was added. Make sure you understand how and when to take each.   Used for:  Bacterial vaginosis        Dose:  500 mg   Take 1 tablet (500 mg) by mouth 2 times daily   Quantity:  14 " tablet   Refills:  0       * metroNIDAZOLE 500 MG tablet   Commonly known as:  FLAGYL   This may have changed:  You were already taking a medication with the same name, and this prescription was added. Make sure you understand how and when to take each.   Used for:  Vaginal itching        Dose:  500 mg   Take 1 tablet (500 mg) by mouth 2 times daily   Quantity:  14 tablet   Refills:  0       * Notice:  This list has 2 medication(s) that are the same as other medications prescribed for you. Read the directions carefully, and ask your doctor or other care provider to review them with you.         Where to get your medicines      These medications were sent to [id number] - [place], [place] - [address] AT [place] [address]  [address], [place] [address]     Phone:  [phone]     metroNIDAZOLE 500 MG tablet                Primary Care Provider Office Phone # Fax #    Carmen King PA-C [phone] [address]  [place] [address]        Equal Access to Services     [facility] AH: Hadii aad ku hadasho Soomaali, waaxda luqadaha, qaybta kaalmada adeegyada, waxay idiin haycoltn skyla brizuela . So [facility] [place] [phone].    ATENCIÓN: Si habla español, tiene a melgar disposición servicios gratuitos de asistencia lingüística. Llame al [phone].    We comply with applicable federal civil rights laws and [place] laws. We do not discriminate on the basis of race, color, national origin, age, disability, sex, sexual orientation, or gender identity.            Thank you!     Thank you for choosing [facility]  for your care. Our goal is always to provide you with excellent care. Hearing back from our patients is one way we can continue to improve our services. Please take a few minutes to complete the written survey that you may receive in the mail after your visit with us. Thank you!             Your Updated Medication List - Protect others around you:  Learn how to safely use, store and throw away your medicines at www.disposemymeds.org.          This list is accurate as of 5/16/18 10:07 AM.  Always use your most recent med list.                   Brand Name Dispense Instructions for use Diagnosis    escitalopram 20 MG tablet    LEXAPRO    30 tablet    TAKE 1 TABLET(20 MG) BY MOUTH DAILY    Moderate recurrent major depression (H)       fluticasone 50 MCG/ACT spray    FLONASE     Spray 1 spray into both nostrils daily        * metroNIDAZOLE 500 MG tablet    FLAGYL    14 tablet    Take 1 tablet (500 mg) by mouth 2 times daily    Bacterial vaginosis       * metroNIDAZOLE 500 MG tablet    FLAGYL    14 tablet    Take 1 tablet (500 mg) by mouth 2 times daily    Vaginal itching       propranolol 20 MG tablet    INDERAL    90 tablet    TAKE 1 TABLET(20 MG) BY MOUTH THREE TIMES DAILY AS NEEDED    Moderate recurrent major depression (H), Anxiety, Anxiety attack       * Notice:  This list has 2 medication(s) that are the same as other medications prescribed for you. Read the directions carefully, and ask your doctor or other care provider to review them with you.

## 2018-05-17 ENCOUNTER — RADIANT APPOINTMENT (OUTPATIENT)
Dept: ULTRASOUND IMAGING | Facility: CLINIC | Age: 38
End: 2018-05-17
Attending: FAMILY MEDICINE
Payer: COMMERCIAL

## 2018-05-17 DIAGNOSIS — N83.201 CYSTS OF BOTH OVARIES: ICD-10-CM

## 2018-05-17 DIAGNOSIS — N83.202 CYSTS OF BOTH OVARIES: ICD-10-CM

## 2018-05-17 LAB
ANION GAP SERPL CALCULATED.3IONS-SCNC: 7 MMOL/L (ref 3–14)
BUN SERPL-MCNC: 9 MG/DL (ref 7–30)
C TRACH DNA SPEC QL NAA+PROBE: NEGATIVE
CALCIUM SERPL-MCNC: 8.5 MG/DL (ref 8.5–10.1)
CHLORIDE SERPL-SCNC: 107 MMOL/L (ref 94–109)
CHOLEST SERPL-MCNC: 187 MG/DL
CO2 SERPL-SCNC: 24 MMOL/L (ref 20–32)
CREAT SERPL-MCNC: 0.75 MG/DL (ref 0.52–1.04)
GFR SERPL CREATININE-BSD FRML MDRD: 86 ML/MIN/1.7M2
GLUCOSE SERPL-MCNC: 84 MG/DL (ref 70–99)
HBV SURFACE AG SERPL QL IA: NONREACTIVE
HCV AB SERPL QL IA: NONREACTIVE
HDLC SERPL-MCNC: 47 MG/DL
HIV 1+2 AB+HIV1 P24 AG SERPL QL IA: NONREACTIVE
LDLC SERPL CALC-MCNC: 124 MG/DL
N GONORRHOEA DNA SPEC QL NAA+PROBE: NEGATIVE
NONHDLC SERPL-MCNC: 140 MG/DL
POTASSIUM SERPL-SCNC: 4.6 MMOL/L (ref 3.4–5.3)
SODIUM SERPL-SCNC: 138 MMOL/L (ref 133–144)
SPECIMEN SOURCE: NORMAL
SPECIMEN SOURCE: NORMAL
T PALLIDUM AB SER QL: NONREACTIVE
TRIGL SERPL-MCNC: 78 MG/DL
TSH SERPL DL<=0.005 MIU/L-ACNC: 1.85 MU/L (ref 0.4–4)

## 2018-05-17 PROCEDURE — 76856 US EXAM PELVIC COMPLETE: CPT | Performed by: OBSTETRICS & GYNECOLOGY

## 2018-05-17 PROCEDURE — 76830 TRANSVAGINAL US NON-OB: CPT | Performed by: OBSTETRICS & GYNECOLOGY

## 2018-05-17 ASSESSMENT — PATIENT HEALTH QUESTIONNAIRE - PHQ9: SUM OF ALL RESPONSES TO PHQ QUESTIONS 1-9: 6

## 2018-05-17 ASSESSMENT — ANXIETY QUESTIONNAIRES: GAD7 TOTAL SCORE: 10

## 2018-05-21 LAB
COPATH REPORT: NORMAL
PAP: NORMAL

## 2018-05-22 DIAGNOSIS — D72.829 LEUKOCYTOSIS, UNSPECIFIED TYPE: Primary | ICD-10-CM

## 2018-05-23 ENCOUNTER — RESULT FOLLOW UP (OUTPATIENT)
Dept: FAMILY MEDICINE | Facility: CLINIC | Age: 38
End: 2018-05-23

## 2018-05-23 ENCOUNTER — HOSPITAL ENCOUNTER (OUTPATIENT)
Dept: MAMMOGRAPHY | Facility: CLINIC | Age: 38
Discharge: HOME | End: 2018-05-23
Attending: FAMILY MEDICINE | Admitting: FAMILY MEDICINE
Payer: COMMERCIAL

## 2018-05-23 ENCOUNTER — HOSPITAL ENCOUNTER (OUTPATIENT)
Dept: ULTRASOUND IMAGING | Facility: CLINIC | Age: 38
End: 2018-05-23
Attending: FAMILY MEDICINE
Payer: COMMERCIAL

## 2018-05-23 DIAGNOSIS — N64.4 BREAST PAIN: ICD-10-CM

## 2018-05-23 DIAGNOSIS — R87.810 CERVICAL HIGH RISK HPV (HUMAN PAPILLOMAVIRUS) TEST POSITIVE: ICD-10-CM

## 2018-05-23 LAB
FINAL DIAGNOSIS: ABNORMAL
HPV HR 12 DNA CVX QL NAA+PROBE: NEGATIVE
HPV16 DNA SPEC QL NAA+PROBE: POSITIVE
HPV18 DNA SPEC QL NAA+PROBE: NEGATIVE
SPECIMEN DESCRIPTION: ABNORMAL
SPECIMEN SOURCE CVX/VAG CYTO: ABNORMAL

## 2018-05-23 PROCEDURE — G0279 TOMOSYNTHESIS, MAMMO: HCPCS

## 2018-05-23 PROCEDURE — 76642 ULTRASOUND BREAST LIMITED: CPT | Mod: RT

## 2018-05-23 NOTE — LETTER
October 30, 2018      Julisa Primo  [address] [address]    Dear MsPrabhuPrimo,      At [facility], your health and wellness is our primary concern. That is why we are following up on a positive high risk HPV test from 5/16/18, which was reported as HPV 16. Your provider had recommended that you have a Colposcopy completed by 8/16/18. Our records do not show that this has been done.     If you have chosen not to do the recommended colposcopy, please contact our office at [phone] to schedule an appointment for a repeat PAP smear and HPV test at your earliest convenience.    If you have completed the tests outside of [facility], please have the results forwarded to our office. We will update the chart for your primary Physician to review before your next annual physical.     Thank you for choosing [facility]!    Sincerely,      Dustin Smith DO/bertha

## 2018-05-23 NOTE — LETTER
July 16, 2018      Julisa Primo  [address] [address]    Dear ,      At [facility], your health and wellness is our primary concern. That is why we are following up on a positive high risk HPV test from 5/16/18, which was reported as HPV 16. Your provider had recommended that you have a Colposcopy completed by 8/16/18. Our records do not show that this has been scheduled.    It is important to complete the follow up that your provider has suggested for you to ensure that there are no worsening changes which may, over time, develop into cancer.      Please contact our [place] office at  [phone] to schedule an appointment for a Colposcopy at your earliest convenience. If you have questions or concerns, please call the clinic and we will be happy to assist you.    If you have completed the tests outside of [facility], please have the results forwarded to our office. We will update the chart for your primary Physician to review before your next annual physical.     Thank you for choosing [facility]!    Sincerely,      Dustin Smith DO/nanci

## 2018-05-23 NOTE — LETTER
November 30, 2018      Julisa Primo  [address] [address]    Dear ,      At [facility], your health and wellness is our primary concern. That is why we are following up on a positive high risk HPV test from 5/16/18, which was reported as HPV 16. Your provider had recommended that you have a Colposcopy completed by 8/16/18. Our records do not show that this has been done.     If you have chosen not to do the recommended colposcopy, please contact our office at [phone] to schedule an appointment for a repeat PAP smear and HPV test at your earliest convenience.    If you have completed the tests outside of [facility], please have the results forwarded to our office. We will update the chart for your primary Physician to review before your next annual physical.     Thank you for choosing [facility]!    Sincerely,      Dustin Smith DO/bertha

## 2018-05-23 NOTE — PROGRESS NOTES
5/16/18 NIL, +HPV 16. Plan colp by 8/16/18 5/23/18 Patient notified of pap and HPV result with recommendation for colp.   7/16/18 [facility] reminder letter sent ([facility])  8/22/18 3mo [facility] not done, updated to 6mo [facility]/Pap due by 11/16/18 ([facility])  10/30/18 [facility]/pap reminder letter sent. ([facility])  11/23/18 Call--No answer and VM is full. Will try again. ([facility])  11/30/18 2nd attempt--no answer and VM still full. Will send additional letter. ([facility])  12/14/18 Patient is lost to pap tracking follow-up. BRIAN routed to provider. ([facility])

## 2018-06-08 DIAGNOSIS — F33.1 MODERATE RECURRENT MAJOR DEPRESSION (H): ICD-10-CM

## 2018-06-08 RX ORDER — ESCITALOPRAM OXALATE 20 MG/1
20 TABLET ORAL DAILY
Qty: 90 TABLET | Refills: 1 | Status: ON HOLD | OUTPATIENT
Start: 2018-06-08 | End: 2018-12-27

## 2018-06-08 NOTE — TELEPHONE ENCOUNTER
Routing refill request to provider for review/approval because:  PHQ9: 6    Marbella Rao RN- Triage [employer]

## 2018-06-08 NOTE — TELEPHONE ENCOUNTER
"Requested Prescriptions   Pending Prescriptions Disp Refills     escitalopram (LEXAPRO) 20 MG tablet  Last Written Prescription Date:  5/11/2018  Last Fill Quantity: 30 tablet,  # refills: 0   Last office visit: 5/16/2018 with prescribing provider:  Sarah   Future Office Visit:       30 tablet 0    SSRIs Protocol Failed    6/8/2018  1:16 PM       Failed - PHQ-9 score less than 5 in past 6 months    Please review last PHQ-9 score.     PHQ-9 SCORE 12/12/2016 7/17/2017 5/16/2018   Total Score 6 9 6     ADRIANA-7 SCORE 12/12/2016 7/17/2017 5/16/2018   Total Score 6 11 10          Passed - Patient is age 18 or older       Passed - No active pregnancy on record       Passed - No positive pregnancy test in last 12 months       Passed - Recent (6 mo) or future (30 days) visit within the authorizing provider's specialty    Patient had office visit in the last 6 months or has a visit in the next 30 days with authorizing provider or within the authorizing provider's specialty.  See \"Patient Info\" tab in inbasket, or \"Choose Columns\" in Meds & Orders section of the refill encounter.              "

## 2018-06-19 DIAGNOSIS — F33.1 MODERATE RECURRENT MAJOR DEPRESSION (H): ICD-10-CM

## 2018-06-19 DIAGNOSIS — F41.9 ANXIETY: ICD-10-CM

## 2018-06-19 DIAGNOSIS — F41.0 ANXIETY ATTACK: ICD-10-CM

## 2018-06-19 RX ORDER — PROPRANOLOL HYDROCHLORIDE 20 MG/1
TABLET ORAL
Qty: 90 TABLET | Refills: 1 | Status: SHIPPED | OUTPATIENT
Start: 2018-06-19 | End: 2018-08-28

## 2018-06-19 NOTE — TELEPHONE ENCOUNTER
"Requested Prescriptions   Pending Prescriptions Disp Refills     propranolol (INDERAL) 20 MG tablet [Pharmacy Med Name: PROPRANOLOL 20MG TABLETS]  Last Written Prescription Date:  2/5/2018  Last Fill Quantity: 90 tablet,  # refills: 1   Last office visit: 5/16/2018 with prescribing provider:  Sarah   Future Office Visit:       90 tablet 0     Sig: TAKE 1 TABLET(20 MG) BY MOUTH THREE TIMES DAILY AS NEEDED    Beta-Blockers Protocol Passed    6/19/2018 11:25 AM       Passed - Blood pressure under 140/90 in past 12 months    BP Readings from Last 3 Encounters:   05/16/18 96/68   01/10/18 108/62   08/08/17 104/64            Passed - Patient is age 6 or older       Passed - Recent (12 mo) or future (30 days) visit within the authorizing provider's specialty    Patient had office visit in the last 12 months or has a visit in the next 30 days with authorizing provider or within the authorizing provider's specialty.  See \"Patient Info\" tab in inbasket, or \"Choose Columns\" in Meds & Orders section of the refill encounter.              "

## 2018-06-19 NOTE — TELEPHONE ENCOUNTER
Prescription approved per [facility] Refill Protocol.  Feli Lan, RN, BSN  [facility]

## 2018-08-28 DIAGNOSIS — F41.0 ANXIETY ATTACK: ICD-10-CM

## 2018-08-28 DIAGNOSIS — F41.9 ANXIETY: ICD-10-CM

## 2018-08-28 DIAGNOSIS — F33.1 MODERATE RECURRENT MAJOR DEPRESSION (H): ICD-10-CM

## 2018-08-28 NOTE — TELEPHONE ENCOUNTER
"Requested Prescriptions   Pending Prescriptions Disp Refills     propranolol (INDERAL) 20 MG tablet [Pharmacy Med Name: PROPRANOLOL 20MG TABLETS]  Last Written Prescription Date:  6/19/2018  Last Fill Quantity: 90 tablet,  # refills: 1   Last office visit: 5/16/2018 with prescribing provider:  Sarah   Future Office Visit:       90 tablet 0     Sig: TAKE 1 TABLET(20 MG) BY MOUTH THREE TIMES DAILY AS NEEDED    Beta-Blockers Protocol Passed    8/28/2018 12:55 PM       Passed - Blood pressure under 140/90 in past 12 months    BP Readings from Last 3 Encounters:   05/16/18 96/68   01/10/18 108/62   08/08/17 104/64                Passed - Patient is age 6 or older       Passed - Recent (12 mo) or future (30 days) visit within the authorizing provider's specialty    Patient had office visit in the last 12 months or has a visit in the next 30 days with authorizing provider or within the authorizing provider's specialty.  See \"Patient Info\" tab in inbasket, or \"Choose Columns\" in Meds & Orders section of the refill encounter.              "

## 2018-08-29 RX ORDER — PROPRANOLOL HYDROCHLORIDE 20 MG/1
TABLET ORAL
Qty: 90 TABLET | Refills: 0 | Status: ON HOLD | OUTPATIENT
Start: 2018-08-29 | End: 2018-12-27

## 2018-08-29 NOTE — TELEPHONE ENCOUNTER
Prescription approved per [facility] Refill Protocol. Rosa Maria Eaton R.N.

## 2018-10-26 ENCOUNTER — OFFICE VISIT (OUTPATIENT)
Dept: FAMILY MEDICINE | Facility: CLINIC | Age: 38
End: 2018-10-26
Payer: COMMERCIAL

## 2018-10-26 VITALS
DIASTOLIC BLOOD PRESSURE: 62 MMHG | HEIGHT: 67 IN | HEART RATE: 115 BPM | BODY MASS INDEX: 20.72 KG/M2 | TEMPERATURE: 98.6 F | SYSTOLIC BLOOD PRESSURE: 102 MMHG | WEIGHT: 132 LBS | OXYGEN SATURATION: 97 %

## 2018-10-26 DIAGNOSIS — Z87.891 FORMER SMOKER: ICD-10-CM

## 2018-10-26 DIAGNOSIS — Z23 NEED FOR INFLUENZA VACCINATION: ICD-10-CM

## 2018-10-26 DIAGNOSIS — R87.810 CERVICAL HIGH RISK HPV (HUMAN PAPILLOMAVIRUS) TEST POSITIVE: ICD-10-CM

## 2018-10-26 DIAGNOSIS — N92.6 IRREGULAR PERIODS: Primary | ICD-10-CM

## 2018-10-26 DIAGNOSIS — F41.9 ANXIETY: ICD-10-CM

## 2018-10-26 DIAGNOSIS — F33.42 RECURRENT MAJOR DEPRESSION IN COMPLETE REMISSION (H): ICD-10-CM

## 2018-10-26 DIAGNOSIS — Z63.5 DIVORCE: ICD-10-CM

## 2018-10-26 LAB
BETA HCG QUAL IFA URINE: NEGATIVE
ERYTHROCYTE [DISTWIDTH] IN BLOOD BY AUTOMATED COUNT: 12.2 % (ref 10–15)
HCT VFR BLD AUTO: 45.4 % (ref 35–47)
HGB BLD-MCNC: 15.2 G/DL (ref 11.7–15.7)
MCH RBC QN AUTO: 30.8 PG (ref 26.5–33)
MCHC RBC AUTO-ENTMCNC: 33.5 G/DL (ref 31.5–36.5)
MCV RBC AUTO: 92 FL (ref 78–100)
PLATELET # BLD AUTO: 306 10E9/L (ref 150–450)
RBC # BLD AUTO: 4.93 10E12/L (ref 3.8–5.2)
TSH SERPL DL<=0.005 MIU/L-ACNC: 2.22 MU/L (ref 0.4–4)
WBC # BLD AUTO: 11.8 10E9/L (ref 4–11)

## 2018-10-26 PROCEDURE — 84443 ASSAY THYROID STIM HORMONE: CPT | Performed by: PHYSICIAN ASSISTANT

## 2018-10-26 PROCEDURE — 99214 OFFICE O/P EST MOD 30 MIN: CPT | Performed by: PHYSICIAN ASSISTANT

## 2018-10-26 PROCEDURE — 85027 COMPLETE CBC AUTOMATED: CPT | Performed by: PHYSICIAN ASSISTANT

## 2018-10-26 PROCEDURE — 36415 COLL VENOUS BLD VENIPUNCTURE: CPT | Performed by: PHYSICIAN ASSISTANT

## 2018-10-26 PROCEDURE — 84703 CHORIONIC GONADOTROPIN ASSAY: CPT | Performed by: PHYSICIAN ASSISTANT

## 2018-10-26 RX ORDER — LEVONORGESTREL/ETHIN.ESTRADIOL 0.1-0.02MG
1 TABLET ORAL DAILY
Qty: 84 TABLET | Refills: 3 | Status: SHIPPED | OUTPATIENT
Start: 2018-10-26 | End: 2019-02-22

## 2018-10-26 SDOH — SOCIAL STABILITY - SOCIAL INSECURITY: DISRUPTION OF FAMILY BY SEPARATION AND DIVORCE: Z63.5

## 2018-10-26 ASSESSMENT — ANXIETY QUESTIONNAIRES
5. BEING SO RESTLESS THAT IT IS HARD TO SIT STILL: MORE THAN HALF THE DAYS
6. BECOMING EASILY ANNOYED OR IRRITABLE: NOT AT ALL
2. NOT BEING ABLE TO STOP OR CONTROL WORRYING: SEVERAL DAYS
3. WORRYING TOO MUCH ABOUT DIFFERENT THINGS: NEARLY EVERY DAY
GAD7 TOTAL SCORE: 9
1. FEELING NERVOUS, ANXIOUS, OR ON EDGE: MORE THAN HALF THE DAYS
IF YOU CHECKED OFF ANY PROBLEMS ON THIS QUESTIONNAIRE, HOW DIFFICULT HAVE THESE PROBLEMS MADE IT FOR YOU TO DO YOUR WORK, TAKE CARE OF THINGS AT HOME, OR GET ALONG WITH OTHER PEOPLE: SOMEWHAT DIFFICULT
7. FEELING AFRAID AS IF SOMETHING AWFUL MIGHT HAPPEN: NOT AT ALL

## 2018-10-26 ASSESSMENT — PATIENT HEALTH QUESTIONNAIRE - PHQ9
5. POOR APPETITE OR OVEREATING: SEVERAL DAYS
SUM OF ALL RESPONSES TO PHQ QUESTIONS 1-9: 4

## 2018-10-26 NOTE — LETTER
My Depression Action Plan  Name: Julisa Tillman   Date of Birth 1980  Date: 10/26/2018    My doctor: Carmen Pham   My clinic: [facility] [place]  [address]  [place] [address] [phone]          GREEN    ZONE   Good Control    What it looks like:     Things are going generally well. You have normal up s and down s. You may even feel depressed from time to time, but bad moods usually last less than a day.   What you need to do:  1. Continue to care for yourself (see self care plan)  2. Check your depression survival kit and update it as needed  3. Follow your physician s recommendations including any medication.  4. Do not stop taking medication unless you consult with your physician first.           YELLOW         ZONE Getting Worse    What it looks like:     Depression is starting to interfere with your life.     It may be hard to get out of bed; you may be starting to isolate yourself from others.    Symptoms of depression are starting to last most all day and this has happened for several days.     You may have suicidal thoughts but they are not constant.   What you need to do:     1. Call your care team, your response to treatment will improve if you keep your care team informed of your progress. Yellow periods are signs an adjustment may need to be made.     2. Continue your self-care, even if you have to fake it!    3. Talk to someone in your support network    4. Open up your depression survival kit           RED    ZONE Medical Alert - Get Help    What it looks like:     Depression is seriously interfering with your life.     You may experience these or other symptoms: You can t get out of bed most days, can t work or engage in other necessary activities, you have trouble taking care of basic hygiene, or basic responsibilities, thoughts of suicide or death that will not go away, self-injurious behavior.     What you need to do:  1. Call your care team and  request a same-day appointment. If they are not available (weekends or after hours) call your local crisis line, emergency room or 911.            Depression Self Care Plan / Survival Kit    Self-Care for Depression  Here s the deal. Your body and mind are really not as separate as most people think.  What you do and think affects how you feel and how you feel influences what you do and think. This means if you do things that people who feel good do, it will help you feel better.  Sometimes this is all it takes.  There is also a place for medication and therapy depending on how severe your depression is, so be sure to consult with your medical provider and/ or Behavioral Health Consultant if your symptoms are worsening or not improving.     In order to better manage my stress, I will:    Exercise  Get some form of exercise, every day. This will help reduce pain and release endorphins, the  feel good  chemicals in your brain. This is almost as good as taking antidepressants!  This is not the same as joining a gym and then never going! (they count on that by the way ) It can be as simple as just going for a walk or doing some gardening, anything that will get you moving.      Hygiene   Maintain good hygiene (Get out of bed in the morning, Make your bed, Brush your teeth, Take a shower, and Get dressed like you were going to work, even if you are unemployed).  If your clothes don't fit try to get ones that do.    Diet  I will strive to eat foods that are good for me, drink plenty of water, and avoid excessive sugar, caffeine, alcohol, and other mood-altering substances.  Some foods that are helpful in depression are: complex carbohydrates, B vitamins, flaxseed, fish or fish oil, fresh fruits and vegetables.    Psychotherapy  I agree to participate in Individual Therapy (if recommended).    Medication  If prescribed medications, I agree to take them.  Missing doses can result in serious side effects.  I understand that  drinking alcohol, or other illicit drug use, may cause potential side effects.  I will not stop my medication abruptly without first discussing it with my provider.    Staying Connected With Others  I will stay in touch with my friends, family members, and my primary care provider/team.    Use your imagination  Be creative.  We all have a creative side; it doesn t matter if it s oil painting, sand castles, or mud pies! This will also kick up the endorphins.    Witness Beauty  (AKA stop and smell the roses) Take a look outside, even in mid-winter. Notice colors, textures. Watch the squirrels and birds.     Service to others  Be of service to others.  There is always someone else in need.  By helping others we can  get out of ourselves  and remember the really important things.  This also provides opportunities for practicing all the other parts of the program.    Humor  Laugh and be silly!  Adjust your TV habits for less news and crime-drama and more comedy.    Control your stress  Try breathing deep, massage therapy, biofeedback, and meditation. Find time to relax each day.     My support system    Clinic Contact:  Phone number:    Contact 1:  Phone number:    Contact 2:  Phone number:    Scientologist/:  Phone number:    Therapist:  Phone number:    Local crisis center:    Phone number:    Other community support:  Phone number:

## 2018-10-26 NOTE — PATIENT INSTRUCTIONS
Risks/benefits/appropriate use of OCPs reviewed.  So glad you quit smoking! Keep it up!  Follow-up with any issues and would consider pelvic US and OB/GYN consult.  Follow-up for colposcopy as planned.

## 2018-10-26 NOTE — PROGRESS NOTES
SUBJECTIVE:   Julisa Tillman is a 38 year old female who presents to clinic today for the following health issues:      Concern - Irregular periods - worse in the past 6 months.  Reports menses has not been regular since her last child who is 6 yrs ago.  Reports she was weighing getting a hysterectomy or an ablation when she lived in [place], but things got better.  Mentions that she and  decided to get a divorce - seeing counseling at [facility]. Things have not been good for awhile.  Feels that mood overall is ok. Anxiety is a bit better - continues on lexapro and has had to use propranolol less. Maybe only a couple times per week.     Will have bleeding every 3-5 weeks, but not predictable. Will bleed for a couple days then stops then will bleed for a week. Changes super tampon every 3 hours for first 1-2 days then declines after that.  Quit smoking in 6/26/2018. Work-outs have been so much better. Work-outs are making her much happier and verbally committed to marathon which she is excited about.    Denies any sexual activity.  Was on OCPs when she was younger and tolerated well. Would like resume.   Mention she did have an IUD and wouldn't mind having one of these again, but that's how she got pregnant with her last child as it was felt that she expelled.   LMP: 10/10/18 Denies any chance for pregnancy as last intercourse was in June.  No hx of migraine with aura    Onset: once every three weeks -    Description:   Discuss irregular periods - irregular flow - heavy and then light    Intensity:     Progression of Symptoms:  same    Accompanying Signs & Symptoms:  -cramping -     Previous history of similar problem:       Precipitating factors:   Worsened by: none    Alleviating factors:  Improved by: none    Therapies Tried and outcome: IUD        Problem list and histories reviewed & adjusted, as indicated.  Additional history: as documented    Patient Active Problem List   Diagnosis      "Anxiety     Anxiety attack     Cervical high risk HPV (human papillomavirus) test positive     Former smoker     Recurrent major depression in complete remission (H)     History reviewed. No pertinent surgical history.    Social History   Substance Use Topics     Smoking status: Former Smoker     Packs/day: 0.25     Years: 20.00     Quit date: 6/26/2018     Smokeless tobacco: Never Used     Alcohol use No     History reviewed. No pertinent family history.      Current Outpatient Prescriptions   Medication Sig Dispense Refill     escitalopram (LEXAPRO) 20 MG tablet Take 1 tablet (20 mg) by mouth daily 90 tablet 1     fluticasone (FLONASE) 50 MCG/ACT spray Spray 1 spray into both nostrils daily       levonorgestrel-ethinyl estradiol (AVIANE,ALESSE,LESSINA) 0.1-20 MG-MCG per tablet Take 1 tablet by mouth daily 84 tablet 3     propranolol (INDERAL) 20 MG tablet TAKE 1 TABLET(20 MG) BY MOUTH THREE TIMES DAILY AS NEEDED 90 tablet 0     No Known Allergies    Reviewed and updated as needed this visit by clinical staff  Tobacco  Allergies  Meds  Med Hx  Surg Hx  Fam Hx  Soc Hx      Reviewed and updated as needed this visit by Provider  Tobacco  Allergies  Meds  Med Hx  Surg Hx  Fam Hx  Soc Hx        ROS:  Constitutional, HEENT, cardiovascular, pulmonary, gi and gu, neuro systems are negative, except as otherwise noted.    OBJECTIVE:     /62 (BP Location: Right arm, Cuff Size: Adult Regular)  Pulse 115  Temp 98.6  F (37  C) (Oral)  Ht 5' 7\" (1.702 m)  Wt 132 lb (59.9 kg)  SpO2 97%  BMI 20.67 kg/m2  Body mass index is 20.67 kg/(m^2).  GENERAL: healthy, alert and no distress  EYES: Eyes grossly normal to inspection, PERRL and conjunctivae and sclerae normal  RESP: lungs clear to auscultation - no rales, rhonchi or wheezes  CV: regular rates and rhythm and no murmur, click or rub  PSYCH: affect bright, normal speech and thought content. No SI or plan.     Diagnostic Test Results:  See flowsheets for " PHQ/ADRIANA scores.  Urine pregnancy test - negative    ASSESSMENT/PLAN:       ICD-10-CM    1. Irregular periods N92.6 TSH with free T4 reflex     CBC with platelets     Beta HCG qual IFA urine     levonorgestrel-ethinyl estradiol (AVIANE,ALESSE,LESSINA) 0.1-20 MG-MCG per tablet   2. Cervical high risk HPV (human papillomavirus) test positive R87.810    3. Need for influenza vaccination - pt will receive through work Z23    4. Former smoker Z87.891    5. Recurrent major depression in complete remission (H) F33.42    6. Divorce Z63.5    7. Anxiety F41.9    Repeat TSH and CBC for irregular menses.  Reviewed this may also be contributed to by stress as well since pt reports she and  are going through a divorce. She is already enrolled in counseling though and feels this will be a positive thing. Has started to focus on her health more as well and quit smoking and increased her exercise. Encouraged to keep it up. Med refills not needed today.  Ok to be a candidate for OCPs now that she is a non-smoker. Risks reviewed and pt would like to see if this helps regulate her menses.   If not improving would consider pelvic US and referral to OB/GYN and she agrees with this.  Also reminded that she is due for colposcopy and she was given info to schedule.  See Patient Instructions  Pt in agreement with plan.     Patient Instructions   Risks/benefits/appropriate use of OCPs reviewed.  So glad you quit smoking! Keep it up!  Follow-up with any issues and would consider pelvic US and OB/GYN consult.  Follow-up for colposcopy as planned.    Carmen Pham PA-C  [facility]

## 2018-10-26 NOTE — MR AVS SNAPSHOT
After Visit Summary   10/26/2018    Julisa Tillman    MRN: [id number]           Patient Information     Date Of Birth          1980        Visit Information        Provider Department      10/26/2018 8:40 AM Carmen Pham PA-C [facility]        Today's Diagnoses     Irregular periods    -  1    Cervical high risk HPV (human papillomavirus) test positive        Need for influenza vaccination - pt will receive through work        Former smoker        Recurrent major depression in complete remission (H)          Care Instructions    Risks/benefits/appropriate use of OCPs reviewed.  So glad you quit smoking! Keep it up!  Follow-up with any issues and would consider pelvic US and OB/GYN consult.  Follow-up for colposcopy as planned.          Follow-ups after your visit        Follow-up notes from your care team     Return in about 1 month (around 11/26/2018) for for colposcopy.      Who to contact     If you have questions or need follow up information about today's clinic visit or your schedule please contact [facility] [place] directly at [phone].  Normal or non-critical lab and imaging results will be communicated to you by MyChart, letter or phone within 4 business days after the clinic has received the results. If you do not hear from us within 7 days, please contact the clinic through [employer] or phone. If you have a critical or abnormal lab result, we will notify you by phone as soon as possible.  Submit refill requests through [employer] or call your pharmacy and they will forward the refill request to us. Please allow 3 business days for your refill to be completed.          Additional Information About Your Visit        Care EveryWhere ID     This is your Care EveryWhere ID. This could be used by other organizations to access your [facility] medical records  [id number]        Your Vitals Were     Pulse Temperature Height Pulse Oximetry BMI (Body Mass Index)     "   115 98.6  F (37  C) (Oral) 5' 7\" (1.702 m) 97% 20.67 kg/m2        Blood Pressure from Last 3 Encounters:   10/26/18 102/62   05/16/18 96/68   01/10/18 108/62    Weight from Last 3 Encounters:   10/26/18 132 lb (59.9 kg)   05/16/18 145 lb (65.8 kg)   01/10/18 145 lb (65.8 kg)              We Performed the Following     Beta HCG qual IFA urine     CBC with platelets     DEPRESSION ACTION PLAN (DAP)     TSH with free T4 reflex          Today's Medication Changes          These changes are accurate as of 10/26/18  9:38 AM.  If you have any questions, ask your nurse or doctor.               Start taking these medicines.        Dose/Directions    levonorgestrel-ethinyl estradiol 0.1-20 MG-MCG per tablet   Commonly known as:  AVIANE,ALESSE,LESSINA   Used for:  Irregular periods   Started by:  Carmen Pham PA-C        Dose:  1 tablet   Take 1 tablet by mouth daily   Quantity:  84 tablet   Refills:  3         Stop taking these medicines if you haven't already. Please contact your care team if you have questions.     metroNIDAZOLE 500 MG tablet   Commonly known as:  FLAGYL   Stopped by:  Carmen Pham PA-C                Where to get your medicines      These medications were sent to [id number] - [place], [place] - [address] AT [facility] [address] [place] [address] [address]     Phone:  [phone]     levonorgestrel-ethinyl estradiol 0.1-20 MG-MCG per tablet                Primary Care Provider Office Phone # Fax #    Carmen hPam PA-C [phone] [address]  [place] [address]        Equal Access to Services     [facility] SMILEY AH: Junior torreso Sony, waaxda luqadaha, qaybta kaalmada adeegyada, rafaela lopez. So [facility] [phone].    ATENCIÓN: Si habla español, tiene a melgar disposición servicios gratuitos de asistencia lingüística. Llame al [phone].    We comply with applicable federal " civil rights laws and [place] laws. We do not discriminate on the basis of race, color, national origin, age, disability, sex, sexual orientation, or gender identity.            Thank you!     Thank you for choosing [facility]  for your care. Our goal is always to provide you with excellent care. Hearing back from our patients is one way we can continue to improve our services. Please take a few minutes to complete the written survey that you may receive in the mail after your visit with us. Thank you!             Your Updated Medication List - Protect others around you: Learn how to safely use, store and throw away your medicines at www.disposemymeds.org.          This list is accurate as of 10/26/18  9:38 AM.  Always use your most recent med list.                   Brand Name Dispense Instructions for use Diagnosis    escitalopram 20 MG tablet    LEXAPRO    90 tablet    Take 1 tablet (20 mg) by mouth daily    Moderate recurrent major depression (H)       fluticasone 50 MCG/ACT spray    FLONASE     Spray 1 spray into both nostrils daily        levonorgestrel-ethinyl estradiol 0.1-20 MG-MCG per tablet    AVIANE,ALESSE,LESSINA    84 tablet    Take 1 tablet by mouth daily    Irregular periods       propranolol 20 MG tablet    INDERAL    90 tablet    TAKE 1 TABLET(20 MG) BY MOUTH THREE TIMES DAILY AS NEEDED    Moderate recurrent major depression (H), Anxiety, Anxiety attack

## 2018-10-27 ASSESSMENT — ANXIETY QUESTIONNAIRES: GAD7 TOTAL SCORE: 9

## 2018-11-01 NOTE — PROGRESS NOTES
Please call or write patient with the following results:    -TSH (thyroid stimulating hormone) level is normal which indicates normal thyroid function.  -Normal red blood cell (hgb) levels and normal platelet levels. White blood cell count is slightly elevated. This is usually more severe if due to an underlying infection so at this point I don't recommend any further follow-up for this.  -Pregnancy test is normal/negative.  Follow-up with plan developed in clinic.    Electronically Signed By: Carmen Pham PA-C

## 2018-11-20 ENCOUNTER — HEALTH MAINTENANCE LETTER (OUTPATIENT)
Age: 38
End: 2018-11-20

## 2018-11-23 ENCOUNTER — TELEPHONE (OUTPATIENT)
Dept: FAMILY MEDICINE | Facility: CLINIC | Age: 38
End: 2018-11-23

## 2018-11-23 NOTE — TELEPHONE ENCOUNTER
Pt is past due for f/u pap smear if declining recommended colposcopy.  No answer and VM is full. Will try again.  Viki Myers,    Pap Tracking

## 2018-11-30 NOTE — TELEPHONE ENCOUNTER
2nd attempt--no answer and VM still full.  Will send additional letter.  Viki Myers,    Pap Tracking

## 2018-12-24 ENCOUNTER — HOSPITAL ENCOUNTER (EMERGENCY)
Facility: CLINIC | Age: 38
Discharge: PSYCHIATRIC OR SUBSTANCE USE FACILITY | End: 2018-12-24
Attending: EMERGENCY MEDICINE | Admitting: EMERGENCY MEDICINE
Payer: COMMERCIAL

## 2018-12-24 ENCOUNTER — HOSPITAL ENCOUNTER (INPATIENT)
Facility: CLINIC | Age: 38
LOS: 3 days | Discharge: HOME | DRG: 885 | End: 2018-12-27
Attending: PSYCHIATRY & NEUROLOGY | Admitting: PSYCHIATRY & NEUROLOGY
Payer: COMMERCIAL

## 2018-12-24 VITALS
DIASTOLIC BLOOD PRESSURE: 95 MMHG | OXYGEN SATURATION: 97 % | RESPIRATION RATE: 18 BRPM | SYSTOLIC BLOOD PRESSURE: 143 MMHG

## 2018-12-24 DIAGNOSIS — F33.1 MODERATE RECURRENT MAJOR DEPRESSION (H): ICD-10-CM

## 2018-12-24 DIAGNOSIS — R45.851 SUICIDAL IDEATION: ICD-10-CM

## 2018-12-24 DIAGNOSIS — F33.2 SEVERE EPISODE OF RECURRENT MAJOR DEPRESSIVE DISORDER, WITHOUT PSYCHOTIC FEATURES (H): Primary | Chronic | ICD-10-CM

## 2018-12-24 LAB
AMPHETAMINES UR QL SCN: NEGATIVE
BARBITURATES UR QL: NEGATIVE
BENZODIAZ UR QL: NEGATIVE
CANNABINOIDS UR QL SCN: POSITIVE
COCAINE UR QL: NEGATIVE
OPIATES UR QL SCN: NEGATIVE
PCP UR QL SCN: NEGATIVE

## 2018-12-24 PROCEDURE — 90791 PSYCH DIAGNOSTIC EVALUATION: CPT

## 2018-12-24 PROCEDURE — 25000132 ZZH RX MED GY IP 250 OP 250 PS 637: Performed by: STUDENT IN AN ORGANIZED HEALTH CARE EDUCATION/TRAINING PROGRAM

## 2018-12-24 PROCEDURE — 12400007 ZZH R&B MH INTERMEDIATE UMMC

## 2018-12-24 PROCEDURE — 99285 EMERGENCY DEPT VISIT HI MDM: CPT | Mod: 25

## 2018-12-24 PROCEDURE — 80307 DRUG TEST PRSMV CHEM ANLYZR: CPT | Performed by: EMERGENCY MEDICINE

## 2018-12-24 RX ORDER — LEVONORGESTREL/ETHIN.ESTRADIOL 0.1-0.02MG
1 TABLET ORAL DAILY
Status: DISCONTINUED | OUTPATIENT
Start: 2018-12-24 | End: 2018-12-27 | Stop reason: HOSPADM

## 2018-12-24 RX ORDER — ESCITALOPRAM OXALATE 20 MG/1
20 TABLET ORAL DAILY
Status: DISCONTINUED | OUTPATIENT
Start: 2018-12-24 | End: 2018-12-27 | Stop reason: HOSPADM

## 2018-12-24 RX ORDER — TRAZODONE HYDROCHLORIDE 50 MG/1
50 TABLET, FILM COATED ORAL
Status: DISCONTINUED | OUTPATIENT
Start: 2018-12-24 | End: 2018-12-27 | Stop reason: HOSPADM

## 2018-12-24 RX ORDER — FLUTICASONE PROPIONATE 50 MCG
1 SPRAY, SUSPENSION (ML) NASAL DAILY
Status: DISCONTINUED | OUTPATIENT
Start: 2018-12-24 | End: 2018-12-27 | Stop reason: HOSPADM

## 2018-12-24 RX ORDER — HYDROXYZINE HYDROCHLORIDE 25 MG/1
25-50 TABLET, FILM COATED ORAL EVERY 6 HOURS PRN
Status: DISCONTINUED | OUTPATIENT
Start: 2018-12-24 | End: 2018-12-27 | Stop reason: HOSPADM

## 2018-12-24 RX ORDER — PROPRANOLOL HYDROCHLORIDE 20 MG/1
20 TABLET ORAL 3 TIMES DAILY PRN
Status: DISCONTINUED | OUTPATIENT
Start: 2018-12-24 | End: 2018-12-27 | Stop reason: HOSPADM

## 2018-12-24 RX ADMIN — TRAZODONE HYDROCHLORIDE 50 MG: 50 TABLET ORAL at 20:57

## 2018-12-24 RX ADMIN — ESCITALOPRAM OXALATE 20 MG: 20 TABLET ORAL at 16:09

## 2018-12-24 RX ADMIN — PROPRANOLOL HYDROCHLORIDE 20 MG: 20 TABLET ORAL at 20:56

## 2018-12-24 ASSESSMENT — ACTIVITIES OF DAILY LIVING (ADL)
FALL_HISTORY_WITHIN_LAST_SIX_MONTHS: NO
TOILETING: 0-->INDEPENDENT
HYGIENE/GROOMING: INDEPENDENT
BATHING: 0-->INDEPENDENT
DRESS: INDEPENDENT
AMBULATION: 0-->INDEPENDENT
RETIRED_EATING: 0-->INDEPENDENT
SWALLOWING: 0-->SWALLOWS FOODS/LIQUIDS WITHOUT DIFFICULTY
ORAL_HYGIENE: INDEPENDENT
RETIRED_COMMUNICATION: 0-->UNDERSTANDS/COMMUNICATES WITHOUT DIFFICULTY
DRESS: 0-->INDEPENDENT
ORAL_HYGIENE: INDEPENDENT
DRESS: INDEPENDENT
HYGIENE/GROOMING: INDEPENDENT
TRANSFERRING: 0-->INDEPENDENT
COGNITION: 0 - NO COGNITION ISSUES REPORTED

## 2018-12-24 ASSESSMENT — ENCOUNTER SYMPTOMS: DYSPHORIC MOOD: 1

## 2018-12-24 NOTE — ED TRIAGE NOTES
Patient presents with friend for suicidal thoughts with plans. Patient states she has had these feelings for a couple months, but it has gotten worse in the past week. ABCDs intact, alert and oriented x 4.

## 2018-12-24 NOTE — PROGRESS NOTES
12/24/18 1252   Patient Belongings   Did you bring any home meds/supplements to the hospital?  No   Patient Belongings remains with patient;locker;sent to security per site process   Belongings Search Yes   Clothing Search Yes   Second Staff Mylene Irwin envelope #[phone]: [facility] Visa - [phone], [employer] Visa - [phone], [facility] Master card - [phone]    Belongings: feminine hygiene products, cell phone, ear buds, charging cord, snacks, book, underwear, boots with laces, wallet, various membership and business cards, student ID, [place] 's license    A               Admission:  I am responsible for any personal items that are not sent to the safe or pharmacy.  [facility] is not responsible for loss, theft or damage of any property in my possession.    Signature:  _________________________________ Date: _______  Time: _____                                              Staff Signature:  ____________________________ Date: ________  Time: _____      2nd Staff person, if patient is unable/unwilling to sign:    Signature: ________________________________ Date: ________  Time: _____     Discharge:  [facility] has returned all of my personal belongings:    Signature: _________________________________ Date: ________  Time: _____                                          Staff Signature:  ____________________________ Date: ________  Time: _____

## 2018-12-24 NOTE — ED PROVIDER NOTES
History     Chief Complaint:  Suicidal Ideation    HPI   Julisa Tillman is a 38 year old female with a history of anxiety, depression who presents to the emergency department for evaluation of suicidal ideation. The patient reports she has had increased stress and anxiety recently due to multiple factors, most recently with finals and work. She states she has had particularly increased anxiety and depression for the past week, accompanied by suicidal ideation with planning. Her plans included carbon monoxide poisoning, firearms, hanging. She endorses previous thoughts of suicide but more frequent and severe recently. The patient denies any self harm or attempts. She denies any alcohol or drug use besides marijuana. She denies any history of self harm. The patient has been hospitalized in the past for postpartum depression. The patient notes she had an anxiety attack in the summer of 2017 and has seen a therapist every 2 months since, last was around 2 months ago. The patient confirms she is compliant with her medications, prescribed by her PCP.    Allergies:  NKDA     Medications:    Lexapro  Flonase  Estradiol  Inderal     Past Medical History:    HPV  Depression  Anxiety    Past Surgical History:    The patient does not have any pertinent past surgical history.    Family History:    No past pertinent family history.    Social History:  Presents with friend.  Former smoker.  Negative for alcohol use.  Marital Status:   [2]     Review of Systems   Psychiatric/Behavioral: Positive for dysphoric mood and suicidal ideas. Negative for self-injury.   All other systems reviewed and are negative.    Physical Exam     Patient Vitals for the past 24 hrs:   BP Heart Rate Resp SpO2   12/24/18 0922 (!) 143/95 85 18 97 %     Physical Exam  General: Tearful  Eyes:  The pupils are equal and round    Conjunctivae and sclerae are normal  ENT:    Moist mucous membranes  Neck:  Normal range of motion  CV:  Regular rate and  rhythm    Skin warm and well perfused   Resp:  Lungs are clear    Non-labored    No rales    No wheezing   MS:  Normal muscular tone  Skin:  No rash or acute skin lesions noted  Neuro:   Awake, alert.      Speech is normal and fluent.    Face is symmetric.     Moves all extremities equally  Psych:  Tearful, crying. Appears sad.  Appropriate interactions.    Emergency Department Course     Laboratory:  Drug abuse screen urine: Cannabinoids Positive, o/w negative    Emergency Department Course:  Nursing notes and vitals reviewed. 0927 I performed an exam of the patient as documented above.     The patient provided a urine sample here in the emergency department. This was sent for laboratory testing, findings above.     1009 I spoke with DEC, who agreed to evaluate the patient.    1102 I spoke with DEC after her evaluation of the patient. DEC endorses psychiatric admission.    1128 I rechecked the patient and discussed the results of her workup thus far.     Findings and plan explained to the Patient. Patient will be transferred to [facility] under Dr. Renee, via EMS. Discussed the case with DEC, who spoke with patient intake, who will admit the patient to a monitored bed for further monitoring, evaluation, and treatment.    Impression & Plan      Medical Decision Making:  Julisa Tillman is a 38-year-old female who presented to the emergency department with suicidal ideation.  Patient with concerning history of thinking of several different plans to harm herself.  Has not harmed herself and she is adamant about this.  Her friend is here in the emergency department with her and very concerned about her.  Does feel that she needs to be hospitalized as she has never heard her friend say these things before.  I agree that she needs hospitalization for stabilization.  Discussed with DEC who evaluated the patient and will admit to psychiatry.  Patient transferred to [facility] for psychiatric admission.    Diagnosis:     ICD-10-CM   1. Suicidal ideation R45.851       Disposition:  Transfer to [facility]    I, Xander Reyes, am serving as a scribe on 12/24/2018 at 9:19 AM to personally document services performed by Stacie Vergara MD based on my observations and the provider's statements to me.     Xander Reyes  12/24/2018   [facility] EMERGENCY DEPARTMENT       Stacie Vergara MD  12/24/18 1154
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
## 2018-12-25 PROBLEM — F33.9 MAJOR DEPRESSION, RECURRENT (H): Chronic | Status: ACTIVE | Noted: 2018-10-26

## 2018-12-25 LAB
ALBUMIN SERPL-MCNC: 3.8 G/DL (ref 3.4–5)
ALP SERPL-CCNC: 42 U/L (ref 40–150)
ALT SERPL W P-5'-P-CCNC: 16 U/L (ref 0–50)
ANION GAP SERPL CALCULATED.3IONS-SCNC: 5 MMOL/L (ref 3–14)
AST SERPL W P-5'-P-CCNC: 15 U/L (ref 0–45)
BASOPHILS # BLD AUTO: 0.1 10E9/L (ref 0–0.2)
BASOPHILS NFR BLD AUTO: 0.6 %
BILIRUB SERPL-MCNC: 0.4 MG/DL (ref 0.2–1.3)
BUN SERPL-MCNC: 9 MG/DL (ref 7–30)
CALCIUM SERPL-MCNC: 8.5 MG/DL (ref 8.5–10.1)
CHLORIDE SERPL-SCNC: 108 MMOL/L (ref 94–109)
CHOLEST SERPL-MCNC: 186 MG/DL
CO2 SERPL-SCNC: 29 MMOL/L (ref 20–32)
CREAT SERPL-MCNC: 0.81 MG/DL (ref 0.52–1.04)
DIFFERENTIAL METHOD BLD: NORMAL
EOSINOPHIL # BLD AUTO: 0.3 10E9/L (ref 0–0.7)
EOSINOPHIL NFR BLD AUTO: 3.9 %
ERYTHROCYTE [DISTWIDTH] IN BLOOD BY AUTOMATED COUNT: 12.4 % (ref 10–15)
GFR SERPL CREATININE-BSD FRML MDRD: 90 ML/MIN/{1.73_M2}
GLUCOSE SERPL-MCNC: 89 MG/DL (ref 70–99)
HCT VFR BLD AUTO: 42.4 % (ref 35–47)
HDLC SERPL-MCNC: 60 MG/DL
HGB BLD-MCNC: 14.1 G/DL (ref 11.7–15.7)
IMM GRANULOCYTES # BLD: 0 10E9/L (ref 0–0.4)
IMM GRANULOCYTES NFR BLD: 0.2 %
LDLC SERPL CALC-MCNC: 105 MG/DL
LYMPHOCYTES # BLD AUTO: 2.1 10E9/L (ref 0.8–5.3)
LYMPHOCYTES NFR BLD AUTO: 25.3 %
MCH RBC QN AUTO: 31.3 PG (ref 26.5–33)
MCHC RBC AUTO-ENTMCNC: 33.3 G/DL (ref 31.5–36.5)
MCV RBC AUTO: 94 FL (ref 78–100)
MONOCYTES # BLD AUTO: 0.5 10E9/L (ref 0–1.3)
MONOCYTES NFR BLD AUTO: 6.1 %
NEUTROPHILS # BLD AUTO: 5.4 10E9/L (ref 1.6–8.3)
NEUTROPHILS NFR BLD AUTO: 63.9 %
NONHDLC SERPL-MCNC: 126 MG/DL
NRBC # BLD AUTO: 0 10*3/UL
NRBC BLD AUTO-RTO: 0 /100
PLATELET # BLD AUTO: 307 10E9/L (ref 150–450)
POTASSIUM SERPL-SCNC: 4.9 MMOL/L (ref 3.4–5.3)
PROT SERPL-MCNC: 7.2 G/DL (ref 6.8–8.8)
RBC # BLD AUTO: 4.5 10E12/L (ref 3.8–5.2)
SODIUM SERPL-SCNC: 142 MMOL/L (ref 133–144)
TRIGL SERPL-MCNC: 106 MG/DL
TSH SERPL DL<=0.005 MIU/L-ACNC: 2.71 MU/L (ref 0.4–4)
WBC # BLD AUTO: 8.5 10E9/L (ref 4–11)

## 2018-12-25 PROCEDURE — 84443 ASSAY THYROID STIM HORMONE: CPT | Performed by: STUDENT IN AN ORGANIZED HEALTH CARE EDUCATION/TRAINING PROGRAM

## 2018-12-25 PROCEDURE — 85025 COMPLETE CBC W/AUTO DIFF WBC: CPT | Performed by: STUDENT IN AN ORGANIZED HEALTH CARE EDUCATION/TRAINING PROGRAM

## 2018-12-25 PROCEDURE — 25000132 ZZH RX MED GY IP 250 OP 250 PS 637: Performed by: PSYCHIATRY & NEUROLOGY

## 2018-12-25 PROCEDURE — 36415 COLL VENOUS BLD VENIPUNCTURE: CPT | Performed by: STUDENT IN AN ORGANIZED HEALTH CARE EDUCATION/TRAINING PROGRAM

## 2018-12-25 PROCEDURE — 25000132 ZZH RX MED GY IP 250 OP 250 PS 637: Performed by: STUDENT IN AN ORGANIZED HEALTH CARE EDUCATION/TRAINING PROGRAM

## 2018-12-25 PROCEDURE — H2032 ACTIVITY THERAPY, PER 15 MIN: HCPCS

## 2018-12-25 PROCEDURE — 82306 VITAMIN D 25 HYDROXY: CPT | Performed by: STUDENT IN AN ORGANIZED HEALTH CARE EDUCATION/TRAINING PROGRAM

## 2018-12-25 PROCEDURE — 99223 1ST HOSP IP/OBS HIGH 75: CPT | Mod: AI | Performed by: PSYCHIATRY & NEUROLOGY

## 2018-12-25 PROCEDURE — 80053 COMPREHEN METABOLIC PANEL: CPT | Performed by: STUDENT IN AN ORGANIZED HEALTH CARE EDUCATION/TRAINING PROGRAM

## 2018-12-25 PROCEDURE — 80061 LIPID PANEL: CPT | Performed by: STUDENT IN AN ORGANIZED HEALTH CARE EDUCATION/TRAINING PROGRAM

## 2018-12-25 PROCEDURE — 12400007 ZZH R&B MH INTERMEDIATE UMMC

## 2018-12-25 RX ORDER — ARIPIPRAZOLE 2 MG/1
2 TABLET ORAL DAILY
Status: DISCONTINUED | OUTPATIENT
Start: 2018-12-25 | End: 2018-12-27 | Stop reason: HOSPADM

## 2018-12-25 RX ADMIN — PROPRANOLOL HYDROCHLORIDE 20 MG: 20 TABLET ORAL at 21:01

## 2018-12-25 RX ADMIN — ESCITALOPRAM OXALATE 20 MG: 20 TABLET ORAL at 08:20

## 2018-12-25 RX ADMIN — PROPRANOLOL HYDROCHLORIDE 20 MG: 20 TABLET ORAL at 11:43

## 2018-12-25 RX ADMIN — ARIPIPRAZOLE 2 MG: 2 TABLET ORAL at 12:55

## 2018-12-25 RX ADMIN — TRAZODONE HYDROCHLORIDE 50 MG: 50 TABLET ORAL at 21:01

## 2018-12-25 ASSESSMENT — ACTIVITIES OF DAILY LIVING (ADL)
DRESS: INDEPENDENT
HYGIENE/GROOMING: INDEPENDENT
ORAL_HYGIENE: INDEPENDENT
LAUNDRY: UNABLE TO COMPLETE

## 2018-12-25 ASSESSMENT — MIFFLIN-ST. JEOR: SCORE: 1287.77

## 2018-12-25 NOTE — PROGRESS NOTES
Pt s mood was calm but had flat affect. She was out with others in the lounge watched TV. Here children and  were here to visit. The visit went well. Her affect was bright when her family was here. She was actively engaging them. She stated that her depression had improved. Her suicidal thoughts were come and go and the intensity had gone down. She was able to manage her thoughts now. She stated that overall, she felt better. She denied having SIB. She denied having auditory or visual hallucination

## 2018-12-25 NOTE — PLAN OF CARE
"48 hour nursing assessment:  Pt evaluation continues. Assessed mood, anxiety, thoughts, and behavior. Is progressing towards goals. Encourage participation in groups and developing healthy coping skills. Pt denies auditory or visual  hallucinations. Refer to daily team meeting notes for individualized plan of care. Will continue to assess.    Pt presents with a sad affect and reports feeling anxious and depressed. Pt reports her anxiety, depression and thoughts of suicide have improved, though they are still present. Pt states she still has thoughts of wanting to die, and thinks occasionally about how she would harm self, though denies having any intention to hurt herself and states \"I want to get help\". Pt reports that her goal is to be \"honest about what I'm feeling\" because she wants help. Pt reports that prior to admission she would cope with anxiety through \"eating disorder\" behavior (restricting, counting calories, binging/purging). Pt reports she is not engaging in any of those behaviors here. Pt states that she uses exercise as a coping skill but \"that can get out of control and become harmful\". Pt is observed in the milieu engaging minimally with peers, watching movies, reading, writing and doing crossword puzzles. Pt states she is feeling sad because it's Jerel and she wants to be with her kids. Family is coming to visit today and she is looking forward to this. Pt denies any physical concerns at this time, and states she is sleeping well. No other concerns at this time. Nursing will continue to monitor and assess.   "
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
## 2018-12-26 LAB — DEPRECATED CALCIDIOL+CALCIFEROL SERPL-MC: 22 UG/L (ref 20–75)

## 2018-12-26 PROCEDURE — 99232 SBSQ HOSP IP/OBS MODERATE 35: CPT | Mod: GC | Performed by: PSYCHIATRY & NEUROLOGY

## 2018-12-26 PROCEDURE — 25000132 ZZH RX MED GY IP 250 OP 250 PS 637: Performed by: PSYCHIATRY & NEUROLOGY

## 2018-12-26 PROCEDURE — G0177 OPPS/PHP; TRAIN & EDUC SERV: HCPCS

## 2018-12-26 PROCEDURE — 25000132 ZZH RX MED GY IP 250 OP 250 PS 637: Performed by: STUDENT IN AN ORGANIZED HEALTH CARE EDUCATION/TRAINING PROGRAM

## 2018-12-26 PROCEDURE — 12400007 ZZH R&B MH INTERMEDIATE UMMC

## 2018-12-26 RX ADMIN — ESCITALOPRAM OXALATE 20 MG: 20 TABLET ORAL at 09:35

## 2018-12-26 RX ADMIN — TRAZODONE HYDROCHLORIDE 50 MG: 50 TABLET ORAL at 22:23

## 2018-12-26 RX ADMIN — FLUTICASONE PROPIONATE 1 SPRAY: 50 SPRAY, METERED NASAL at 09:36

## 2018-12-26 RX ADMIN — TRAZODONE HYDROCHLORIDE 50 MG: 50 TABLET ORAL at 19:56

## 2018-12-26 RX ADMIN — PROPRANOLOL HYDROCHLORIDE 20 MG: 20 TABLET ORAL at 18:38

## 2018-12-26 RX ADMIN — PROPRANOLOL HYDROCHLORIDE 20 MG: 20 TABLET ORAL at 12:22

## 2018-12-26 RX ADMIN — ARIPIPRAZOLE 2 MG: 2 TABLET ORAL at 09:35

## 2018-12-26 ASSESSMENT — ACTIVITIES OF DAILY LIVING (ADL)
LAUNDRY: WITH SUPERVISION
HYGIENE/GROOMING: INDEPENDENT;SHOWER
ORAL_HYGIENE: INDEPENDENT
HYGIENE/GROOMING: INDEPENDENT
DRESS: INDEPENDENT
ORAL_HYGIENE: INDEPENDENT
DRESS: INDEPENDENT

## 2018-12-26 NOTE — PROGRESS NOTES
"    ----------------------------------------------------------------------------------------------------------  [facility], [place]   Psychiatric Progress Note     Interim History:   The patient's care was discussed with the treatment team and chart notes were reviewed.     Sleep: 7 hours  Scheduled meds: adherent  PRN meds: Propranolol 20 mg PO x 2, trazodone 50 mg PO x 1    Staff report: Patient was very tearful when discussing her symptoms. She also stated it was very hard to be away from her children.    Patient interview: Julisa met with the treatment team in the conference room. She states that she feels significantly better than when she was admitted, though she isn't sure that the medication \"could work that fast.\" She denies any side effects. She does attribute a a portion of her improvement to having heard from her father that her  agreed to leave their house. 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     She describes her decompensation and her plan for suicide by gun as \"a crazy train\" and \"not me at all,\" saying \"I hate guns. I was in the [employer], for eff's sake!\" She feels that hospitalization has allowed her to \"take a step back\" from her distress and change course. 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     The risks, benefits, alternatives and side effects of any medication changes have been " "discussed and are understood by the patient and other caregivers.    Psychiatric Review of systems:   The Review of Systems is negative other than noted in the HPI         Psychiatric Examination:   /81   Pulse 69   Temp 98  F (36.7  C) (Oral)   Resp 16   Ht 1.702 m (5' 7\")   Wt 58.5 kg (129 lb)   SpO2 97%   BMI 20.20 kg/m    Weight is 129 lbs 0 oz  Body mass index is 20.2 kg/m .  Appearance: Thin woman, with painted nails and wearing make-up, dressed in hospital scrubs. Awake, alert, interactive.  Attitude:  cooperative and friendly  Eye Contact:  good  Mood:  \"better\"  Affect:  mood congruent and constricted mobility; tearful at times when discussing difficult topics  Speech:  clear, coherent with normal speech latency, moderate rate and volume  Psychomotor Behavior:  fidgeting, no keke agitation or retardation  Thought Process:  logical, linear and goal oriented  Associations:  no loose associations  Thought Content:  Occasional passive SI, no HI or AH/VH. No intent to act on SI.  Insight: good; eager to get treatment for eating disorder in spite of clear distress from talking about it.   Judgment:  fair. States she \"promised my best friend I would get treatment.\"  Oriented to:  time, person, and place  Attention Span and Concentration:  adequate for interview  Recent and Remote Memory:  intact  Language: Fluent English with normal syntax and vocabulary  Fund of Knowledge: appropriate  Muscle Strength and Tone: normal  Gait and Station: Normal     Assessment    DIagnostic Impression: Julisa Tillman is a 38 year old female with past psychiatric diagnoses of MDD, ADRIANA, past substance use issues, and an undiagnosed eating disorder who presents with worsening depression and SI w/ plan to shoot herself with a gun. This occurred in the context of worsening depressed mood, bingeing and purging eating behaviors, ongoing divorce in which she is continuing to live with him, working full time and going to " graduate school, and financial issues. The patient's last psychiatric hospitalization was in 2011 in [place] for post-partum depression.  The patient is currently not followed by any mental health providers. Family history is notable for chemical dependency, mood disorders, attempted and completed suicide.  The patient denies any current drug abuse or self injurious behaviors, but has a history of both, alcohol bingeing and cutting, respectively. The patient's reported symptoms of are consistent with historic diagnosis of MDD and ADRIANA. Additionally, the patient has symptoms of bulimia nervosa that have gone untreated for decades and need to be addressed this admission.     Hospital course: Julisa Tillman was admitted to station 20 as a voluntary patient on 12/24/18.  PTA Lexapro was continued, and Abilify was started on admission at 2 mg daily. By day 3 of admission, she reported feeling improved, with lingering intermittent passive SI.     Medical course: Julisa Tillman was medically cleared by the ED prior to admission to the unit. Admission labs were all within normal limits.    Plan     Principal Diagnosis: MDD, moderate, without psychotic features  Secondary psychiatric diagnoses of concern this admission: Bulimia nervosa    Medications:   Aripiprazole 2 mg PO daily  Escitalopram 20 mg PO daily    Laboratory/Imaging: None pending    Plan:   - Arrange intake at an eating disorder program  - Continue medications as above   - Patient will be treated in therapeutic milieu with appropriate individual and group therapies as described.  - The patient requires continued inpatient hospitalization due to ongoing danger to self      Medical diagnoses to be addressed this admission:  None    Legal Status: Voluntary    Safety Assessment:   Behavioral Orders   Procedures     Code 1 - Restrict to Unit     Routine Programming     As clinically indicated     Status 15     Every 15 minutes.     Suicide precautions     Patients  on Suicide Precautions should have a Combination Diet ordered that includes a Diet selection(s) AND a Behavioral Tray selection for Safe Tray - with utensils, or Safe Tray - NO utensils     Anticipated Disposition/Discharge Date: 1-2 days    Pt seen and discussed with my attending, MD Joanne Garcia MD  PGY-2 Psychiatry Resident      Attestation:  I, Vasu Renee, saw and evaluated the patient with the resident physician.  I agree with the findings and plan of care as documented in the resident note.  I have reviewed all labs and vital signs.               Labs since admission:     Results for orders placed or performed during the hospital encounter of 12/24/18   Comprehensive metabolic panel   Result Value Ref Range    Sodium 142 133 - 144 mmol/L    Potassium 4.9 3.4 - 5.3 mmol/L    Chloride 108 94 - 109 mmol/L    Carbon Dioxide 29 20 - 32 mmol/L    Anion Gap 5 3 - 14 mmol/L    Glucose 89 70 - 99 mg/dL    Urea Nitrogen 9 7 - 30 mg/dL    Creatinine 0.81 0.52 - 1.04 mg/dL    GFR Estimate 90 >60 mL/min/[1.73_m2]    GFR Estimate If Black >90 >60 mL/min/[1.73_m2]    Calcium 8.5 8.5 - 10.1 mg/dL    Bilirubin Total 0.4 0.2 - 1.3 mg/dL    Albumin 3.8 3.4 - 5.0 g/dL    Protein Total 7.2 6.8 - 8.8 g/dL    Alkaline Phosphatase 42 40 - 150 U/L    ALT 16 0 - 50 U/L    AST 15 0 - 45 U/L   CBC with platelets differential   Result Value Ref Range    WBC 8.5 4.0 - 11.0 10e9/L    RBC Count 4.50 3.8 - 5.2 10e12/L    Hemoglobin 14.1 11.7 - 15.7 g/dL    Hematocrit 42.4 35.0 - 47.0 %    MCV 94 78 - 100 fl    MCH 31.3 26.5 - 33.0 pg    MCHC 33.3 31.5 - 36.5 g/dL    RDW 12.4 10.0 - 15.0 %    Platelet Count 307 150 - 450 10e9/L    Diff Method Automated Method     % Neutrophils 63.9 %    % Lymphocytes 25.3 %    % Monocytes 6.1 %    % Eosinophils 3.9 %    % Basophils 0.6 %    % Immature Granulocytes 0.2 %    Nucleated RBCs 0 0 /100    Absolute Neutrophil 5.4 1.6 - 8.3 10e9/L    Absolute Lymphocytes 2.1 0.8 - 5.3 10e9/L     Absolute Monocytes 0.5 0.0 - 1.3 10e9/L    Absolute Eosinophils 0.3 0.0 - 0.7 10e9/L    Absolute Basophils 0.1 0.0 - 0.2 10e9/L    Abs Immature Granulocytes 0.0 0 - 0.4 10e9/L    Absolute Nucleated RBC 0.0    TSH with free T4 reflex   Result Value Ref Range    TSH 2.71 0.40 - 4.00 mU/L   Lipid profile   Result Value Ref Range    Cholesterol 186 <200 mg/dL    Triglycerides 106 <150 mg/dL    HDL Cholesterol 60 >49 mg/dL    LDL Cholesterol Calculated 105 (H) <100 mg/dL    Non HDL Cholesterol 126 <130 mg/dL   Vitamin D   Result Value Ref Range    Vitamin D Deficiency screening 22 20 - 75 ug/L

## 2018-12-26 NOTE — PROGRESS NOTES
"CLINICAL NUTRITION SERVICES - ASSESSMENT NOTE     Nutrition Prescription    RECOMMENDATIONS FOR MDs/PROVIDERS TO ORDER:  1. Referral to disordered eating treatment center at discharge for further evaluation (Per Patient, planning to attend out-patient treatment at [facility]).   2. Consider initiation of daily MVI with minerals to meet micronutrient needs.     Malnutrition Status:    Patient does not meet two of the criteria necessary for diagnosing malnutrition, however is at risk.     Recommendations already ordered by Registered Dietitian (RD):  None today.     Future/Additional Recommendations:  Encourage intakes at TID meals + prn snacks available on the unit. Monitor for purging following PO. If patient noted to resume this behavior, encourage to remain in common areas x30-60 minutes following all PO.      REASON FOR ASSESSMENT  Julisa Tillman is a/an 38 year old female assessed by the dietitian for Admission Nutrition Risk Screen for reduced oral intake over the last month    NUTRITION HISTORY  Julisa is on a restrictive diet at home. Endorses disordered eating patterns since the age of 7; planning to pursue out-patient treatment at [facility] post discharge. Stated she hasn't sought treatment in the past due to feeling \"embarrased\". Denies purging since admit, however would purge following PO PTA. Stated she uses physical activity as a coping mechanism and means to combat weight gain/promote weight loss in the past, spinning for 90 minutes per day and doing piliates. Stated she has been making an effort to consume \"normal\" meals since admit and hasnt purged.    CURRENT NUTRITION ORDERS  Diet: Regular  Intake/Tolerance: Consumed yogurt and cheerios for breakfast today, saved orange off tray for an AM snack. Denies N/V/D following PO.     LABS  Labs reviewed    MEDICATIONS  Medications reviewed    ANTHROPOMETRICS  Height: 170.2 cm (5' 7\")  Most Recent Weight: 58.5 kg (129 lb)    IBW: 61.4 kg  BMI: " Normal BMI  Weight History: Per Epic review weight down 1.4 kg (2.3%) x 2 months, 7.3 kg (11.1%) x 7 months, 7.3 kg (11.1%) x 11 months and 23.6 kg (28.7% x 2 years). Julisa reports over the past 10 years, lowest weight of 115 lbs and highest weight of 190 lbs.   Wt Readings from Last 10 Encounters:   12/25/18 58.5 kg (129 lb)   10/26/18 59.9 kg (132 lb)   05/16/18 65.8 kg (145 lb)   01/10/18 65.8 kg (145 lb)   08/08/17 71.7 kg (158 lb)   07/17/17 72.1 kg (159 lb)   06/22/17 72.6 kg (160 lb)   05/22/17 78.5 kg (173 lb)   05/17/17 78.5 kg (173 lb)   12/12/16 82.1 kg (181 lb)     Dosing Weight: 59 kg    ASSESSED NUTRITION NEEDS  Estimated Energy Needs: 2919-9761 kcals/day (25 - 30 kcals/kg)  Justification: Maintenance  Estimated Protein Needs: 59-71 grams protein/day (1 - 1.2 grams of pro/kg)  Justification: Maintenance  Estimated Fluid Needs: (25 - 30 mL/kg)   Justification: Maintenance    PHYSICAL FINDINGS  See malnutrition section below.  No abnormal nutrition-related physical findings observed.     MALNUTRITION  % Intake: No decreased intake noted - per Patient report   % Weight Loss: non-severe  Subcutaneous Fat Loss: None observed, although wearing baggy pants and sweatshirt  Muscle Loss: None observed, although wearing baggy pants and sweatshirt  Fluid Accumulation/Edema: Unable to assess  Malnutrition Diagnosis: Patient does not meet two of the above criteria necessary for diagnosing malnutrition, however is at risk.     NUTRITION DIAGNOSIS  Disordered eating pattern related to patients desire for weight loss as evidenced by self-reported diagnosis of bulimia, binging and purging.     INTERVENTIONS  Implementation  Discussed nutrition history and PO since admission. Discussed menu ordering and snacks available on the unit. Julisa stated she has been able to identify several items that sound appealing/that she will be able to consume. Contracted she will not purge this admission and stated she would like  to pursue evaluation/treatment at [facility] post discharge. Declined desire for specific items at meals/snacks. Denied questions regarding room service menus. Encouraged adequate PO of food and fluids.    Goals  Patient to consume % of nutritionally adequate meal trays TID, or the equivalent with supplements/snacks.  Patient will refrain from binging/purging surrounding admit.      Monitoring/Evaluation  Progress toward goals will be monitored and evaluated per protocol.    Carmen Woodall RD, LD  Unit Pager: [phone]

## 2018-12-26 NOTE — PROGRESS NOTES
12/26/18 1445   Behavioral Health   Hallucinations denies / not responding to hallucinations   Thinking distractable   Orientation person: oriented;place: oriented;date: oriented   Memory baseline memory   Insight admits / accepts   Judgement impaired   Eye Contact at examiner   Affect tense   Mood mood is calm;depressed   Physical Appearance/Attire attire appropriate to age and situation   Hygiene well groomed   Suicidality other (see comments)  (denies, but recent hisory of passive SI)   1. Wish to be Dead No   2. Non-Specific Active Suicidal Thoughts  No   Self Injury other (see comment)  (denies)   Elopement (nothing to report)   Activity other (see comment)  (social with peers and attending groups)   Speech clear;coherent   Medication Sensitivity no observed side effects   Psychomotor / Gait balanced;steady   Psycho Education   Type of Intervention 1:1 intervention   Response participates, initiates socially appropriate   Hours 0.5   Treatment Detail (check in and community meeting)   Activities of Daily Living   Hygiene/Grooming independent;shower   Oral Hygiene independent   Dress independent   Room Organization independent   The patient was out of her room and attending groups this shift.  The patient was social with peers, but was observed at times tearing up or crying while sitting in the lounge.  The patient reports feeling bad about being in the hospital over the holidays.  The patient reports that she is going to be going to an eating disorder program after here and reports this will be the first time she had dealt with her lifelong eating issues.  The patient reports she is feeling nervious, but also proud of herself for taking the step to get help.  The patient reports she was having no SI this shift, but did have some passive SI the night before.  The patient reports being scared but hopeful about getting back to her family after the challenge of ED treatment.  The patient denies SiB.

## 2018-12-26 NOTE — PLAN OF CARE
BEHAVIORAL TEAM DISCUSSION    Participants:   Dr. Renee, Dr. Hill, Dr. Woods, Jenny Dykes RN, Leslee Nian MA.LP    Continued Stay Criteria/Rationale:   Worsening depression, SI    Medical/Physical:   None    Precautions:   Behavioral Orders   Procedures     Code 1 - Restrict to Unit     Routine Programming     As clinically indicated     Status 15     Every 15 minutes.     Suicide precautions     Patients on Suicide Precautions should have a Combination Diet ordered that includes a Diet selection(s) AND a Behavioral Tray selection for Safe Tray - with utensils, or Safe Tray - NO utensils       Plan:  Patient will have ongoing psychiatric assessment.  Medications will be reviewed and adjusted per MD as indicated.    Hospital staff will provide a safe environment and a therapeutic milieu. Patient will be encouraged to participate in unit groups and activities.   CTC will continue to assess needs and  ensure appropriate follow up care is in place.       Rationale for change in precautions or plan:   No change in plan/precautions    I have reviewed and agree with the plan of care as written above.  Vasu Renee MD

## 2018-12-26 NOTE — PLAN OF CARE
General Plan of Care (Inpatient Behavioral)  Individualization/Patient Specific Goal (IP Behavioral)  Description  The patient and/or their representative will achieve their patient-specific goals related to the plan of care.    The patient-specific goals include:  12/26/2018 0834 by Leslee Nina LP  Flowsheets  Taken 12/26/2018 0834   Patient Strengths  Steady employment;Stable and supportive family;Utilized support systems;Involved in community;School engagement;Has vocational interests i.e., hobbies;Stable housing;Motivated and ready for change;Adherent to medication regime  Note  Patient's goals:  Feel more stable  Med mgmt    Plan for admission:  1. Stabilization of mood disorder symptoms  2. Absence of SI- safe with self  3. Medication management per MD's  4. ED addressed  5. Coordination of care with outpatient providers, family  6. Psychiatric follow up care in place

## 2018-12-26 NOTE — PROGRESS NOTES
"OT Self-Assessment  OT staff reviewed with pt and explained the value of having them involved in their treatment plan, and provided options to meet current needs/self-identified goals. Pt was given and completed a written self-assessment form. Skilled observation and assessment information was used to complete OT Evaluation.       12/26/18 1500   General Information   Date Initially Attended OT 12/26/18   Special Considerations see note   Clinical Impression   Affect Appropriate to situation   Orientation Oriented to person, place and time   Appearance and ADLs Neatly groomed   Attention to Internal Stimuli No observed signs   Interaction Skills Interacts appropriately with staff;Interacts appropriately with peers   Ability to Communicate Needs Independent   Verbal Content Articulate   Ability to Maintain Boundaries Maintains appropriate physical boundaries;Maintains appropriate verbal boundaries   Participation Initiates participation;Independently participates   Concentration Concentrates 30+ minutes   Ability to Concentrate With structure   Follows and Comprehends Directions Independently follows multi-step directions   Memory Delayed and immediate recall intact   Organization Independently organizes all tasks   Decision Making Independent   Planning and Problem Solving Independently plans ahead   Ability to Apply and Learn Concepts Applies within group structure   Frustrations / Stress Tolerance Independently identifies sources of frustration/stress;Independently identifies skills    Level of Insight Insightful into needs, issues, goals   Self Esteem Accepts positive feedback   Social Supports Identifies utilizing supports       Reported daily routine to include a high level of structure- work M-F, four children in several activities, and working out.     Current triggers for exacerbation of symptoms and/or use: relationships and finances.    Self-identified social supports: \"friends\"    Previously used coping " "skills: exercise and meditation; Identified several interest to explore healthy coping strategies.     Priority Goals: \"I want to handle my stress appropriately\".     Identified OT Goals: Pt will practice using >2 coping strategies to manage stress and reduce symptoms to demonstrate increased readiness for discharge.       Pt would benefit from engagement in OT groups that support healthy recovery, specifically exploration of positive coping skills for symptom management/relapse prevention. Initiate occupational therapy goals per plan of care.     "

## 2018-12-26 NOTE — PLAN OF CARE
OT General Care Plan  OT Goal 1  Description  Pt will practice using >2 coping strategies to manage stress and reduce symptoms to demonstrate increased readiness for discharge.     INITIAL OT NOTE    Subjective  Overall was pleasant and engaged. Congruent affect - intermittently bright.     Objective  Attended 2 hour(s) of occupational therapy this date.     Pt participated in this morning's group game to facilitate peer socialization and physical wellness. Insightful r/t MI -- Pt shared desire to increase MH and derick relationships with others, reported value of her role as a mother, and find a balance between caring for herself and others. Fully engaged in prompted stretches/exercises.     Pt's first attendance in occupational therapy clinic this date. Independent to initiate, gather materials, sequence and adjust to workspace demands as needed. Demonstrated good focus, planning, and problem solving. Able to ask for assistance as needed and appeared comfortable interacting with peers and staff.     Assessment  Pt would benefit from continued engagement in OT groups that support healthy recovery, specifically exploration of positive coping skills for symptom management/relapse prevention.     Plan  Provide personally meaningful graded occupation-based activities and ongoing assessment.       12/26/2018 1525 - Improving by Yael Al, OT

## 2018-12-26 NOTE — PROGRESS NOTES
Pt s mood was calm and had full range of affect. Her friend was here to visit. The visit went well. Pt told this writer that her suicidal thoughts had diminished. No active thought or plan or intent currently. However, her depression and anxiety persisted but improved. She watched TV in the lounge with others and interacted with others. She denied having SIB. She denied having auditory or visual hallucination.

## 2018-12-27 VITALS
HEIGHT: 67 IN | TEMPERATURE: 98.5 F | BODY MASS INDEX: 19.93 KG/M2 | DIASTOLIC BLOOD PRESSURE: 75 MMHG | WEIGHT: 127 LBS | SYSTOLIC BLOOD PRESSURE: 117 MMHG | HEART RATE: 82 BPM | RESPIRATION RATE: 16 BRPM | OXYGEN SATURATION: 97 %

## 2018-12-27 PROCEDURE — 25000132 ZZH RX MED GY IP 250 OP 250 PS 637: Performed by: PSYCHIATRY & NEUROLOGY

## 2018-12-27 PROCEDURE — G0177 OPPS/PHP; TRAIN & EDUC SERV: HCPCS

## 2018-12-27 PROCEDURE — 99238 HOSP IP/OBS DSCHRG MGMT 30/<: CPT | Mod: GC | Performed by: PSYCHIATRY & NEUROLOGY

## 2018-12-27 PROCEDURE — 25000132 ZZH RX MED GY IP 250 OP 250 PS 637: Performed by: STUDENT IN AN ORGANIZED HEALTH CARE EDUCATION/TRAINING PROGRAM

## 2018-12-27 RX ORDER — ARIPIPRAZOLE 2 MG/1
2 TABLET ORAL DAILY
Qty: 30 TABLET | Refills: 0 | Status: SHIPPED | OUTPATIENT
Start: 2018-12-28 | End: 2019-02-22

## 2018-12-27 RX ORDER — PROPRANOLOL HYDROCHLORIDE 20 MG/1
20 TABLET ORAL 3 TIMES DAILY PRN
Qty: 30 TABLET | Refills: 0 | Status: SHIPPED | OUTPATIENT
Start: 2018-12-27 | End: 2019-02-22

## 2018-12-27 RX ORDER — ESCITALOPRAM OXALATE 20 MG/1
20 TABLET ORAL DAILY
Qty: 30 TABLET | Refills: 0 | Status: SHIPPED | OUTPATIENT
Start: 2018-12-27 | End: 2019-02-22

## 2018-12-27 RX ORDER — TRAZODONE HYDROCHLORIDE 50 MG/1
50 TABLET, FILM COATED ORAL
Qty: 30 TABLET | Refills: 0 | Status: SHIPPED | OUTPATIENT
Start: 2018-12-27 | End: 2019-02-22

## 2018-12-27 RX ADMIN — FLUTICASONE PROPIONATE 1 SPRAY: 50 SPRAY, METERED NASAL at 08:49

## 2018-12-27 RX ADMIN — ESCITALOPRAM OXALATE 20 MG: 20 TABLET ORAL at 08:49

## 2018-12-27 RX ADMIN — ARIPIPRAZOLE 2 MG: 2 TABLET ORAL at 08:49

## 2018-12-27 RX ADMIN — LEVONORGESTREL AND ETHINYL ESTRADIOL 1 TABLET: KIT at 08:51

## 2018-12-27 ASSESSMENT — ACTIVITIES OF DAILY LIVING (ADL)
HYGIENE/GROOMING: INDEPENDENT
DRESS: INDEPENDENT
ORAL_HYGIENE: INDEPENDENT

## 2018-12-27 ASSESSMENT — MIFFLIN-ST. JEOR: SCORE: 1278.7

## 2018-12-27 NOTE — PLAN OF CARE
Received written and verbal discharge instructions.  All questions answered, verbalizes understanding.  Denies suicidal thoughts, thoughts of self harm or hallucinations.  Received all belongings and filled prescriptions prior to leaving.  Discharged home. Friend provided ride home.

## 2018-12-27 NOTE — PROGRESS NOTES
Patient is being discharged home today.  She reports feeling much better and denies any further thoughts of self harm.  Patient has been referred to [facility] to address her disordered eating. She reports that she is looking forward to this.  Patient will have intake tomorrow and will request Psychiatry, therapy and dietary consultations.  Patient will arrange a ride home this afternoon.

## 2018-12-27 NOTE — DISCHARGE INSTRUCTIONS
Behavioral Discharge Planning and Instructions    Summary:   You were admitted to [facility] on 12/24/18 with worsening depressive symptoms, suicidal ideaiton under the care of Dr. Renee.  You met with Dr. Renee and his team daily for ongoing psychiatric assessment and medication management.  You had opportunities to participate in therapeutic groups on the unit.   At this time you report your mood is stabilizing and you report you are not having thoughts or intent to harm yourself. You will be discharged home and have been referred to the [facility] for outpatient mental health/Eating Disorder Treatment.    Disposition:  Home    Main Diagnosis:   Major Depressive Disorder  Bulimia Nervosa    Major Treatments, Procedures and Findings:   Medications were  managed throughout your stay. An internal medicine consult was completed during your stay. You had the opportunity to participate in treatment programming while on the unit including occupational therapy, mental health support and education and spiritual services.     Symptoms to Report:   Please report if you are experiencing increased aggression and/or confusion, problematic loss of sleep, worsening mood, or thoughts of suicide to your treatment team or notify your primary provider.   IF THE SYMPTOMS YOU ARE EXPERIENCING ARE A MEDICAL EMERGENCY, CALL 911 IMMEDIATELY    Lifestyle Adjustment:   1. Adjust your lifestyle to get enough sleep, relaxation, exercise and good nutrition.  Continue to develop healthy coping skills to decrease stress and promote a healthy lifestyle.  2. Abstain from all substances of abuse.  3. Take medications as prescribed.  Please work with your doctor to discuss any concerns you have with your medications or side effects you may be experiencing.  4. Follow up with appointments as scheduled.        Psychiatry Follow-up:   Appointment: Psychiatry:  Intake: 12/28/18 at 11:00am  [facility]: [address] [facility]  [facility], [place]  [place] [address]   P: [phone]    F: [phone]     Resources:   [facility] Crisis 8:00 AM - 4:30 PM call [phone] for crisis appointment during business hours. 4:30 PM to 8:00 AM call [phone] for [facility] Crisis Response mobile services.    *[email or web address]: Text  \"life\"  to [id number]   A trained crisis counselor will respond immediately   *[facility] Emergency Room: [phone]    General Medication Instructions:   See your medication sheet(s) for instructions.   Take all medicines as directed.  Make no changes unless your doctor suggests them.   Go to all your doctor visits.  Be sure to have all your required lab tests. This way, your medicines can be refilled on time.  Do not use any drugs not prescribed by your doctor.    The treatment team has appreciated the opportunity to work with you.  We wish you the best in the future.    If you have any questions or concerns our unit number is [phone].     "

## 2018-12-27 NOTE — DISCHARGE SUMMARY
"    ----------------------------------------------------------------------------------------------------------  [facility]  Discharge Summary      Julisa Tillman MRN# [id number]   Age: 38 year old YOB: 1980     Date of Admission:  12/24/2018  Date of Discharge:  12/27/2018  Admitting Physician:  Vasu Renee MD  Discharge Physician:  Vasu Renee MD         Event Leading to Hospitalization:   Taken from H&P dated 12/24/18:    \"Julisa Tillman is a 38 year old female with past psychiatric diagnoses of MDD, ADRIANA, past substance use issues, and an undiagnosed eating disorder who presents with worsening depression and SI w/ plan to shoot herself with a gun. This occurred in the context of worsening depressed mood, bingeing and purging eating behaviors, ongoing divorce in which she is continuing to live with him, working full time and going to graduate school, and financial issues.      Per ED notes and DEC assessment, Julisa called her friend this morning and told her she was feeling suicidal and was planning to obtain a gun to kill herself so her friend picked her up and brought her to the hospital. In the ED she reported feeling agitated, impulsive, isolated, hopeless, worthless, helpless, like her thoughts were racing, obsessional, and excessively worried.  She reported having these feelings for the last 3 months and they have gotten worse over the past week.      Per interview, Julisa is extremely tearful and states she feels \"awful\". She states that she \"just needs help\" and that she has tried to go along with life without feeling things for a long time now. She states \"I think it's catching up with me now and it's spilling over. I can't hold it in anymore.\". She states that she \"doesn't want to raise her kids in a stressful home\" and that is why she is seeking help so she can \"be a better mom than the one she had\". She reports that " "earlier this morning she had a plan to go around the corner to a gun store and buy a gun because she was \"ready to just blow her head off\". She states \"I hate guns. I don't know how I got to this point\".  She is open to any treatment suggestions including ECT and states \"I'll do whatever you guys tell me to do. I just want to starting being honest and saying these things out loud.\" She endorses being open to eating disorder treatment and states she has never done anything to address this. She states \"I have never told anybody about it except my friend who brought me in today\". She states she binges and purges and her pattern is irregular. She states she also uses laxatives and has caps on her teeth now because she has destroyed them over the years with the purging.  She states she is currently on Lexapro and endorses medication adherence. She is open to adding to or changing up the anti-depressant. She also takes propranolol for anxiety and panic and states that this is very helpful for her. She does not like taking benzos because they \"make her head feel cloudy\". She states she struggled with alcohol in the past and had been sober up until October of this year when she binged. She then drank again last Friday.     Julisa Tillman's goals of this hospitalization are to \"start being honest and do whatever needs to be done to get better.\"\"           See Admission note by Dr. Dariana MD on 12/24/18 for additional details.          Diagnoses:     Principal Diagnosis: MDD, moderate, without psychotic features  Secondary psychiatric diagnoses of concern this admission: Bulimia nervosa     Diagnostic Impression: Julisa Tillman is a 38 year old female with past psychiatric diagnoses of MDD, ADRIANA, past substance use issues, and an undiagnosed eating disorder who presented with worsening depression and SI w/ plan to shoot herself with a gun. This occurred in the context of worsening depressed mood, bingeing and purging " eating behaviors, ongoing divorce, working full time, and going to graduate school, and financial issues. The patient's last psychiatric hospitalization was in 2011 in [place] for post-partum depression.  The patient is currently not followed by any mental health providers. Family history is notable for chemical dependency, mood disorders, attempted and completed suicide.  The patient denied any drug abuse or self injurious behaviors, but has a history of both, alcohol bingeing and cutting, respectively. The patient's reported symptoms were consistent with historic diagnosis of MDD and ADRIANA. Additionally, the patient has symptoms of untreated bulimia nervosa.            Labs:   Please see appendix at the end of this document for full report of inpatient lab results.          Consults:   None during this admission          Hospital Course:     Hospital course: Julisa Tillman was admitted to station 20 as a voluntary patient on 12/24/18.  PTA Lexapro was continued, and Abilify was started on admission at 2 mg daily. By day 3 of admission, she reported feeling improved, with lingering intermittent passive SI.  By day 4 of hospitalization Julisa stated she was feeling even better than her baseline.  She had no thoughts of SI.  She had made an intake appointment with the [facility] for 12/28/18 to address her Bulimia Nervosa.      Julisa Tillman was discharged to home. At the time of discharge Julisa Tillman was determined to not be a danger to herself or others.      Today Julisa Tillman denies SI/SIB/HI. In addition, Julisa Tillman has notable risk factors for self-harm, including single status and anxiety. However, risk is mitigated by commitment to family, sobriety, ability to volunteer a safety plan and history of seeking help when needed.Additional steps taken to minimize risk include: intake appointment made with the [facility], inpatient stabilization, medication optimization. Therefore, based on all  available evidence including the factors cited above, Julisa Tillman does not appear to be at imminent risk for self-harm, and is appropriate for outpatient level of care.     This document serves as a transfer of care to Julisa Tillman's outpatient providers.         Discharge Medications:     - Aripiprazole 2mg tablet daily   - Escitalopram 20mg tablet daily   - Propranolol 20mg tablet TID PRN for anxiety   - Trazodone 50mg tablet PRN at bedtime for sleep          Psychiatric Examination:     Appearance:  awake, alert, adequately groomed, dressed in hospital scrubs and appeared as age stated  Attitude:  cooperative  Eye Contact:  good  Mood:  better  Affect:  appropriate and in normal range and mood congruent  Speech:  clear, coherent  Psychomotor Behavior:  no evidence of tardive dyskinesia, dystonia, or tics  Thought Process:  logical, linear and goal oriented  Associations:  no loose associations  Thought Content:  no evidence of suicidal ideation or homicidal ideation, no evidence of psychotic thought, no auditory hallucinations present and no visual hallucinations present  Insight:  good  Judgment:  intact  Oriented to:  time, person, and place  Attention Span and Concentration:  intact  Recent and Remote Memory:  intact  Language: Able to name objects, Able to repeat phrases and Able to read and write  Fund of Knowledge: appropriate  Muscle Strength and Tone: normal  Gait and Station: Normal         Discharge Plan:     Appointment: Psychiatry:  Intake: 12/28/18 at 11:00am  [facility]: [address] [place] [id number]   P: [phone]    F: [phone]       This patient was seen and discussed with my attending physician.  Dr. Manjula Sung DO, MAHI, MS  PGY-1 Psychiatry Resident Physician         Attestation:  I, Vasu Renee, saw and evaluated the patient with the resident physician.  I agree with the findings and plan of care as documented in the resident note.  I have  reviewed all labs and vital signs.  IVasu, have reviewed this summary and agree with the findings and discharge plan as written.        Appendix A:        Labs:     Results for orders placed or performed during the hospital encounter of 12/24/18   Comprehensive metabolic panel   Result Value Ref Range    Sodium 142 133 - 144 mmol/L    Potassium 4.9 3.4 - 5.3 mmol/L    Chloride 108 94 - 109 mmol/L    Carbon Dioxide 29 20 - 32 mmol/L    Anion Gap 5 3 - 14 mmol/L    Glucose 89 70 - 99 mg/dL    Urea Nitrogen 9 7 - 30 mg/dL    Creatinine 0.81 0.52 - 1.04 mg/dL    GFR Estimate 90 >60 mL/min/[1.73_m2]    GFR Estimate If Black >90 >60 mL/min/[1.73_m2]    Calcium 8.5 8.5 - 10.1 mg/dL    Bilirubin Total 0.4 0.2 - 1.3 mg/dL    Albumin 3.8 3.4 - 5.0 g/dL    Protein Total 7.2 6.8 - 8.8 g/dL    Alkaline Phosphatase 42 40 - 150 U/L    ALT 16 0 - 50 U/L    AST 15 0 - 45 U/L   CBC with platelets differential   Result Value Ref Range    WBC 8.5 4.0 - 11.0 10e9/L    RBC Count 4.50 3.8 - 5.2 10e12/L    Hemoglobin 14.1 11.7 - 15.7 g/dL    Hematocrit 42.4 35.0 - 47.0 %    MCV 94 78 - 100 fl    MCH 31.3 26.5 - 33.0 pg    MCHC 33.3 31.5 - 36.5 g/dL    RDW 12.4 10.0 - 15.0 %    Platelet Count 307 150 - 450 10e9/L    Diff Method Automated Method     % Neutrophils 63.9 %    % Lymphocytes 25.3 %    % Monocytes 6.1 %    % Eosinophils 3.9 %    % Basophils 0.6 %    % Immature Granulocytes 0.2 %    Nucleated RBCs 0 0 /100    Absolute Neutrophil 5.4 1.6 - 8.3 10e9/L    Absolute Lymphocytes 2.1 0.8 - 5.3 10e9/L    Absolute Monocytes 0.5 0.0 - 1.3 10e9/L    Absolute Eosinophils 0.3 0.0 - 0.7 10e9/L    Absolute Basophils 0.1 0.0 - 0.2 10e9/L    Abs Immature Granulocytes 0.0 0 - 0.4 10e9/L    Absolute Nucleated RBC 0.0    TSH with free T4 reflex   Result Value Ref Range    TSH 2.71 0.40 - 4.00 mU/L   Lipid profile   Result Value Ref Range    Cholesterol 186 <200 mg/dL    Triglycerides 106 <150 mg/dL    HDL Cholesterol 60 >49 mg/dL    LDL  Cholesterol Calculated 105 (H) <100 mg/dL    Non HDL Cholesterol 126 <130 mg/dL   Vitamin D   Result Value Ref Range    Vitamin D Deficiency screening 22 20 - 75 ug/L

## 2018-12-27 NOTE — PROGRESS NOTES
Behavioral Health  Note   Behavioral Health  Spirituality Group Note     [facility]    Name: Julisa Tillman    YOB: 1980   MRN: [id number]    Age: 38 year old     Patient attended -led group, which included discussion of spirituality, coping with illness and building resilience.   Patient attended group for 1.0 hrs.   The patient actively participated in group discussion     Akira [facility]  Staff    Page [phone]

## 2018-12-27 NOTE — PROGRESS NOTES
"   12/26/18 2100   Behavioral Health   Hallucinations denies / not responding to hallucinations   Thinking distractable   Orientation person: oriented;place: oriented   Memory baseline memory   Insight insight appropriate to situation   Judgement impaired   Eye Contact at examiner   Affect full range affect   Mood mood is calm   Physical Appearance/Attire neat   Hygiene well groomed   Suicidality other (see comments)  (denies)   1. Wish to be Dead No   2. Non-Specific Active Suicidal Thoughts  No   Self Injury other (see comment)  (denies)   Activity other (see comment)  (visible in the milieu and socialized with others )   Speech clear;coherent   Activities of Daily Living   Hygiene/Grooming independent   Oral Hygiene independent   Dress independent   Laundry with supervision   Room Organization independent       Pt denied SI and SIB.  Pt reported feeling \" I feel like my medicine is working I also feel better.\"  Pt seems calm, ate supper, visible in the milieu, attended group and socialized with others.  Pt is independent with ADL's.    "

## 2018-12-28 ENCOUNTER — TELEPHONE (OUTPATIENT)
Dept: FAMILY MEDICINE | Facility: CLINIC | Age: 38
End: 2018-12-28

## 2018-12-28 NOTE — TELEPHONE ENCOUNTER
"Hospital/TCU/ED for chronic condition Discharge Protocol    \"Hi, my name is Rosa Maria Eaton, a registered nurse, and I am calling from [facility]  I am calling to follow up and see how things are going for you after your recent emergency visit/hospital/TCU stay.\"    Tell me how you are doing now that you are home?\" I am fine, I'm doing well, I did my follow up with my psychiatrist. \"    Patient was polite but did not want to schedule or discuss anything further.       Discharge Instructions    \"Let's review your discharge instructions.  What is/are the follow-up recommendations?  Pt. Response: Not answered    \"Has an appointment with your primary care provider been scheduled?\"   No (schedule appointment) Patient does not want to schedule with primary.    \"When you see the provider, I would recommend that you bring your medications with you.\"    Medications    \"Tell me what changed about your medicines when you discharged?\"    Changes to chronic meds?    Not answered    \"What questions do you have about your medications?\"    None     New diagnoses of heart failure, COPD, diabetes, or MI?    No              Medication reconciliation completed? No, due to Patient did not want to discuss.   Was MTM referral placed (*Make sure to put transitions as reason for referral)?   No    Call Summary    \"What questions or concerns do you have about your recent visit and your follow-up care?\"     none    \"If you have questions or things don't continue to improve, we encourage you contact us through the main clinic number (give number).  Even if the clinic is not open, triage nurses are available 24/7 to help you.     We would like you to know that our clinic has extended hours (provide information).  We also have urgent care (provide details on closest location and hours/contact info)\"      \"Thank you for your time and take care!\"             "

## 2018-12-28 NOTE — TELEPHONE ENCOUNTER
Pt was DC'd from [facility] on 12/27/2018 after being treated for Moderate Recurrent Major Depression (H), Severe Episode Of Recurrent Major Depressive Disorder, Without Psychotic F.  Next scheduled appt with PCP is not scheduled.  Please call patient.  Thank you!  Yael Bagley

## 2019-01-14 DIAGNOSIS — F33.1 MODERATE RECURRENT MAJOR DEPRESSION (H): ICD-10-CM

## 2019-01-14 RX ORDER — ESCITALOPRAM OXALATE 20 MG/1
20 TABLET ORAL DAILY
Qty: 30 TABLET | Refills: 0 | Status: SHIPPED | OUTPATIENT
Start: 2019-01-14 | End: 2019-06-11

## 2019-01-14 NOTE — TELEPHONE ENCOUNTER
"Requested Prescriptions   Pending Prescriptions Disp Refills     escitalopram (LEXAPRO) 20 MG tablet [Pharmacy Med Name: ESCITALOPRAM 20MG TABLETS]  Last Written Prescription Date:  12/27/2018  Last Fill Quantity: 30 tablet,  # refills: 0   Last office visit: 10/26/2018 with prescribing provider:  Vero     Future Office Visit:       90 tablet 0     Sig: TAKE 1 TABLET(20 MG) BY MOUTH DAILY    SSRIs Protocol Passed - 1/14/2019  4:02 AM       Passed - PHQ-9 score less than 5 in past 6 months    Please review last PHQ-9 score.     PHQ-9 SCORE 7/17/2017 5/16/2018 10/26/2018   PHQ-9 Total Score 9 6 4     ADRIANA-7 SCORE 7/17/2017 5/16/2018 10/26/2018   Total Score 11 10 9          Passed - Medication is active on med list       Passed - Patient is age 18 or older       Passed - No active pregnancy on record       Passed - No positive pregnancy test in last 12 months       Passed - Recent (6 mo) or future (30 days) visit within the authorizing provider's specialty    Patient had office visit in the last 6 months or has a visit in the next 30 days with authorizing provider or within the authorizing provider's specialty.  See \"Patient Info\" tab in inbasket, or \"Choose Columns\" in Meds & Orders section of the refill encounter.            "

## 2019-01-14 NOTE — TELEPHONE ENCOUNTER
Routing refill request to provider for review/approval because:  Dahiana given x1 and patient did not follow up, please advise  Patient needs to be seen because:  Patient recently hospitalized with suicidal ideation - followed up with psychiatry, but declined follow up with primary.  Feli Lan, FIDELN, RN  [facility]

## 2019-01-15 NOTE — TELEPHONE ENCOUNTER
Temp refill, needs appt for further refills or she should have prescribed from psychiatry if they are taking over her care. Please notify.  Electronically Signed By: Carmen Pham PA-C

## 2019-01-24 NOTE — TELEPHONE ENCOUNTER
Called [phone] and spoke with pt.  She has an appt with psych tomorrow so will ask if they can take over prescribing.  If not, she will call to make an appt.  Yael Bagley

## 2019-02-22 ENCOUNTER — OFFICE VISIT (OUTPATIENT)
Dept: FAMILY MEDICINE | Facility: CLINIC | Age: 39
End: 2019-02-22
Payer: COMMERCIAL

## 2019-02-22 VITALS
BODY MASS INDEX: 20.56 KG/M2 | TEMPERATURE: 98.3 F | DIASTOLIC BLOOD PRESSURE: 58 MMHG | HEART RATE: 78 BPM | WEIGHT: 131 LBS | SYSTOLIC BLOOD PRESSURE: 94 MMHG | HEIGHT: 67 IN | OXYGEN SATURATION: 97 %

## 2019-02-22 DIAGNOSIS — F41.9 ANXIETY: Primary | ICD-10-CM

## 2019-02-22 DIAGNOSIS — R87.810 CERVICAL HIGH RISK HPV (HUMAN PAPILLOMAVIRUS) TEST POSITIVE: ICD-10-CM

## 2019-02-22 DIAGNOSIS — F33.2 SEVERE EPISODE OF RECURRENT MAJOR DEPRESSIVE DISORDER, WITHOUT PSYCHOTIC FEATURES (H): Chronic | ICD-10-CM

## 2019-02-22 DIAGNOSIS — F33.1 MODERATE RECURRENT MAJOR DEPRESSION (H): ICD-10-CM

## 2019-02-22 PROCEDURE — 99213 OFFICE O/P EST LOW 20 MIN: CPT | Performed by: NURSE PRACTITIONER

## 2019-02-22 RX ORDER — TRAZODONE HYDROCHLORIDE 50 MG/1
50 TABLET, FILM COATED ORAL
Qty: 30 TABLET | Refills: 2 | Status: SHIPPED | OUTPATIENT
Start: 2019-02-22 | End: 2019-06-11

## 2019-02-22 RX ORDER — ARIPIPRAZOLE 2 MG/1
2 TABLET ORAL DAILY
Qty: 30 TABLET | Refills: 2 | Status: SHIPPED | OUTPATIENT
Start: 2019-02-22 | End: 2019-06-01

## 2019-02-22 RX ORDER — ESCITALOPRAM OXALATE 20 MG/1
20 TABLET ORAL DAILY
Qty: 30 TABLET | Refills: 2 | Status: SHIPPED | OUTPATIENT
Start: 2019-02-22 | End: 2019-05-25

## 2019-02-22 RX ORDER — PROPRANOLOL HYDROCHLORIDE 20 MG/1
20 TABLET ORAL 3 TIMES DAILY PRN
Qty: 90 TABLET | Refills: 2 | Status: SHIPPED | OUTPATIENT
Start: 2019-02-22 | End: 2020-11-30

## 2019-02-22 ASSESSMENT — PATIENT HEALTH QUESTIONNAIRE - PHQ9
SUM OF ALL RESPONSES TO PHQ QUESTIONS 1-9: 5
5. POOR APPETITE OR OVEREATING: MORE THAN HALF THE DAYS

## 2019-02-22 ASSESSMENT — ANXIETY QUESTIONNAIRES
5. BEING SO RESTLESS THAT IT IS HARD TO SIT STILL: NEARLY EVERY DAY
6. BECOMING EASILY ANNOYED OR IRRITABLE: MORE THAN HALF THE DAYS
3. WORRYING TOO MUCH ABOUT DIFFERENT THINGS: SEVERAL DAYS
IF YOU CHECKED OFF ANY PROBLEMS ON THIS QUESTIONNAIRE, HOW DIFFICULT HAVE THESE PROBLEMS MADE IT FOR YOU TO DO YOUR WORK, TAKE CARE OF THINGS AT HOME, OR GET ALONG WITH OTHER PEOPLE: VERY DIFFICULT
GAD7 TOTAL SCORE: 14
2. NOT BEING ABLE TO STOP OR CONTROL WORRYING: SEVERAL DAYS
1. FEELING NERVOUS, ANXIOUS, OR ON EDGE: NEARLY EVERY DAY
7. FEELING AFRAID AS IF SOMETHING AWFUL MIGHT HAPPEN: MORE THAN HALF THE DAYS

## 2019-02-22 ASSESSMENT — MIFFLIN-ST. JEOR: SCORE: 1296.84
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
## 2019-02-23 ASSESSMENT — ANXIETY QUESTIONNAIRES: GAD7 TOTAL SCORE: 14

## 2019-02-26 ENCOUNTER — TELEPHONE (OUTPATIENT)
Dept: PSYCHIATRY | Facility: CLINIC | Age: 39
End: 2019-02-26

## 2019-02-26 NOTE — LETTER
February 26, 2019      RE: Julisa Tillman  [address] [phone]         To Whom it May Concern,    Julisa Tillman was hospitalized under my care at [facility] from 12/24/18 to 1/14/19. Please excuse any absences from work during this time period.    If you have additional questions regarding this letter, please call the clinic at the number above.       Sincerely,      Vasu Renee MD

## 2019-02-26 NOTE — TELEPHONE ENCOUNTER
Vasu Renee MD Pratt, Laura, RN   Phone Number: [phone]             I can document she was hospitalized 1/24-1/27, but I don't know what she did afterwards.  She was referred to an eating disorders program and may need to get part of the documentation from them.    Previous Messages      ----- Message -----   From: Taryn Dowd, RN   Sent: 2/25/2019   2:24 PM   To: Vasu Renee MD   Subject: FW: Work documentation                           Hi Dr. Renee,     Please see pt's request from when she was inpatient. Is this something you're willing to do?     -Taryn   ----- Message -----   From: Richard Figueroa   Sent: 2/22/2019   9:53 AM   To: Taryn Dowd RN   Subject: Work documentation                               Good morning Taryn!   Patient called this morning requesting documention for her employer regarding her treatment her at the [facility]. She stated she was seen by Dr. Renee on 12/24/18, and she needed documentation to sow/prove to be cleared from work from the dates of 12/24/18-01/14/19 per her visit. Unsure if Dr. Renee has that type of documents on hand or what that would entail. I gave her the number to medical records as well in case they would have direct access to that.

## 2019-02-26 NOTE — TELEPHONE ENCOUNTER
Writer called pt at the number provided to gather more information. She is requesting a signed letter stating she was hospitalized under Dr. Renee's care from 12/24/18 to 1/14/19 to excuse her absences from work. On 1/15, the pt reports she started treatment for her eating disorder and the treatment center already completed a letter for her time period during their programming. Writer inquired about fax number for completed number. Pt will c/b with this information. She also informed writer that short term disability will be contacting the clinic for additional information.    Writer drafted letter, printed and placed in provider's folder for review and edits. Also, [facility]. Partially completed forms and placed in provider's folder. Message routed to provider.

## 2019-03-01 NOTE — TELEPHONE ENCOUNTER
Received signed letter from the provider, but he did not complete paperwork, due to pt not being established with the clinic.     Pt never called back with fax number for letter. Uncertain if this should be faxed to the same numbers on the forms. Will try contacting pt on Monday (reminder set).

## 2019-03-05 NOTE — TELEPHONE ENCOUNTER
Called pt to receive fax number. Pt apologized and agreed to get this and call writer back. Will await return phone call.

## 2019-05-25 DIAGNOSIS — F33.1 MODERATE RECURRENT MAJOR DEPRESSION (H): ICD-10-CM

## 2019-05-28 RX ORDER — ESCITALOPRAM OXALATE 20 MG/1
TABLET ORAL
Qty: 30 TABLET | Refills: 0 | Status: SHIPPED | OUTPATIENT
Start: 2019-05-28 | End: 2019-06-11

## 2019-05-28 NOTE — TELEPHONE ENCOUNTER
"Requested Prescriptions   Pending Prescriptions Disp Refills     escitalopram (LEXAPRO) 20 MG tablet [Pharmacy Med Name: ESCITALOPRAM 20MG TABLETS]  Last Written Prescription Date:  2/22/2019  Last Fill Quantity: 30 tablet,  # refills: 2   Last office visit: 2/22/2019 with prescribing provider:  Sally     Future Office Visit:       30 tablet 0     Sig: TAKE 1 TABLET(20 MG) BY MOUTH DAILY       SSRIs Protocol Failed - 5/25/2019  4:02 AM        Failed - PHQ-9 score less than 5 in past 6 months     Please review last PHQ-9 score.     PHQ-9 SCORE 5/16/2018 10/26/2018 2/22/2019   PHQ-9 Total Score 6 4 5     ADRIANA-7 SCORE 5/16/2018 10/26/2018 2/22/2019   Total Score 10 9 14           Passed - Medication is active on med list        Passed - Patient is age 18 or older        Passed - No active pregnancy on record        Passed - No positive pregnancy test in last 12 months        Passed - Recent (6 mo) or future (30 days) visit within the authorizing provider's specialty     Patient had office visit in the last 6 months or has a visit in the next 30 days with authorizing provider or within the authorizing provider's specialty.  See \"Patient Info\" tab in inbasket, or \"Choose Columns\" in Meds & Orders section of the refill encounter.            "

## 2019-05-28 NOTE — TELEPHONE ENCOUNTER
Routing refill request to provider for review/approval because:  Out of range:  PHQ-9 and ADRIANA-7  Patient needs to be seen because:  Was due for med check as of 5/22/19  FIDEL GomezN, RN  [facility]

## 2019-06-01 DIAGNOSIS — F33.2 SEVERE EPISODE OF RECURRENT MAJOR DEPRESSIVE DISORDER, WITHOUT PSYCHOTIC FEATURES (H): Chronic | ICD-10-CM

## 2019-06-03 RX ORDER — ARIPIPRAZOLE 2 MG/1
TABLET ORAL
Qty: 30 TABLET | Refills: 0 | Status: SHIPPED | OUTPATIENT
Start: 2019-06-03 | End: 2019-06-11

## 2019-06-03 NOTE — TELEPHONE ENCOUNTER
Routing refill request to provider for review/approval because:  Past due as of 5/22/19 for follow up.     Rosa Maria Eaton R.N.

## 2019-06-03 NOTE — TELEPHONE ENCOUNTER
Requested Prescriptions   Pending Prescriptions Disp Refills     ARIPiprazole (ABILIFY) 2 MG tablet [Pharmacy Med Name: ARIPIPRAZOLE 2MG TABLETS]  Last Written Prescription Date:  2/22/2019  Last Fill Quantity: 30 tablet,  # refills: 2   Last office visit: 2/22/2019 with prescribing provider:  Sally     Future Office Visit:       30 tablet 0     Sig: TAKE 1 TABLET(2 MG) BY MOUTH DAILY       Antipsychotic Medications Passed - 6/1/2019  4:09 AM        Passed - Blood pressure under 140/90 in past 12 months     BP Readings from Last 3 Encounters:   02/22/19 94/58   12/26/18 117/75   12/24/18 (!) 143/95             Passed - Patient is 12 years of age or older        Passed - Lipid panel on file within the past 12 months     Recent Labs   Lab Test 12/25/18  0800   CHOL 186   TRIG 106   HDL 60   *   NHDL 126             Passed - CBC on file in past 12 months     Recent Labs   Lab Test 12/25/18  0800   WBC 8.5   RBC 4.50   HGB 14.1   HCT 42.4                Passed - Heart Rate on file within past 12 months     Pulse Readings from Last 3 Encounters:   02/22/19 78   12/26/18 82   10/26/18 115               Passed - A1c or Glucose on file in past 12 months     Recent Labs   Lab Test 12/25/18  0800   GLC 89       Please review patients last 3 weights. If a weight gain of >10 lbs exists, you may refill the prescription once after instructing the patient to schedule an appointment within the next 30 days.    Wt Readings from Last 3 Encounters:   02/22/19 59.4 kg (131 lb)   12/27/18 57.6 kg (127 lb)   10/26/18 59.9 kg (132 lb)             Passed - Medication is active on med list        Passed - Patient is not pregnant        Passed - No positve pregnancy test on file in past 12 months        Passed - Recent (6 mo) or future (30 days) visit within the authorizing provider's specialty     Patient had office visit in the last 6 months or has a visit in the next 30 days with authorizing provider or within the  "authorizing provider's specialty.  See \"Patient Info\" tab in inbasket, or \"Choose Columns\" in Meds & Orders section of the refill encounter.            "

## 2019-06-11 ENCOUNTER — OFFICE VISIT (OUTPATIENT)
Dept: FAMILY MEDICINE | Facility: CLINIC | Age: 39
End: 2019-06-11
Payer: COMMERCIAL

## 2019-06-11 VITALS
HEART RATE: 91 BPM | DIASTOLIC BLOOD PRESSURE: 62 MMHG | TEMPERATURE: 99.5 F | BODY MASS INDEX: 19.46 KG/M2 | WEIGHT: 124 LBS | HEIGHT: 67 IN | OXYGEN SATURATION: 99 % | SYSTOLIC BLOOD PRESSURE: 122 MMHG

## 2019-06-11 DIAGNOSIS — Z23 NEED FOR TDAP VACCINATION: ICD-10-CM

## 2019-06-11 DIAGNOSIS — F50.9 EATING DISORDER: ICD-10-CM

## 2019-06-11 DIAGNOSIS — F41.9 ANXIETY: ICD-10-CM

## 2019-06-11 DIAGNOSIS — F33.1 MODERATE RECURRENT MAJOR DEPRESSION (H): Primary | ICD-10-CM

## 2019-06-11 DIAGNOSIS — Z12.31 ENCOUNTER FOR SCREENING MAMMOGRAM FOR BREAST CANCER: ICD-10-CM

## 2019-06-11 DIAGNOSIS — J06.9 UPPER RESPIRATORY TRACT INFECTION, UNSPECIFIED TYPE: ICD-10-CM

## 2019-06-11 DIAGNOSIS — R87.810 CERVICAL HIGH RISK HPV (HUMAN PAPILLOMAVIRUS) TEST POSITIVE: ICD-10-CM

## 2019-06-11 PROCEDURE — 99214 OFFICE O/P EST MOD 30 MIN: CPT | Mod: 25 | Performed by: PHYSICIAN ASSISTANT

## 2019-06-11 PROCEDURE — 90471 IMMUNIZATION ADMIN: CPT | Performed by: PHYSICIAN ASSISTANT

## 2019-06-11 PROCEDURE — 90715 TDAP VACCINE 7 YRS/> IM: CPT | Performed by: PHYSICIAN ASSISTANT

## 2019-06-11 RX ORDER — ARIPIPRAZOLE 2 MG/1
TABLET ORAL
Qty: 30 TABLET | Refills: 0 | Status: SHIPPED | OUTPATIENT
Start: 2019-06-11 | End: 2019-08-06

## 2019-06-11 RX ORDER — ESCITALOPRAM OXALATE 20 MG/1
20 TABLET ORAL DAILY
Qty: 90 TABLET | Refills: 0 | Status: SHIPPED | OUTPATIENT
Start: 2019-06-11

## 2019-06-11 RX ORDER — TRAZODONE HYDROCHLORIDE 50 MG/1
50 TABLET, FILM COATED ORAL
Qty: 30 TABLET | Refills: 2 | Status: SHIPPED | OUTPATIENT
Start: 2019-06-11

## 2019-06-11 ASSESSMENT — ANXIETY QUESTIONNAIRES
5. BEING SO RESTLESS THAT IT IS HARD TO SIT STILL: SEVERAL DAYS
7. FEELING AFRAID AS IF SOMETHING AWFUL MIGHT HAPPEN: NEARLY EVERY DAY
1. FEELING NERVOUS, ANXIOUS, OR ON EDGE: NEARLY EVERY DAY
6. BECOMING EASILY ANNOYED OR IRRITABLE: NEARLY EVERY DAY
GAD7 TOTAL SCORE: 17
2. NOT BEING ABLE TO STOP OR CONTROL WORRYING: NEARLY EVERY DAY
3. WORRYING TOO MUCH ABOUT DIFFERENT THINGS: NEARLY EVERY DAY

## 2019-06-11 ASSESSMENT — PATIENT HEALTH QUESTIONNAIRE - PHQ9
5. POOR APPETITE OR OVEREATING: SEVERAL DAYS
SUM OF ALL RESPONSES TO PHQ QUESTIONS 1-9: 14

## 2019-06-11 ASSESSMENT — MIFFLIN-ST. JEOR: SCORE: 1260.09
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
## 2019-06-11 NOTE — PATIENT INSTRUCTIONS
So glad you're going to treatment and would continue efforts therapy as planned.  Given severity of symptoms though would also advise getting psychiatry involved in your care.  Continue medications without changes for now.

## 2019-06-12 ASSESSMENT — ANXIETY QUESTIONNAIRES: GAD7 TOTAL SCORE: 17

## 2019-06-20 ENCOUNTER — NURSE TRIAGE (OUTPATIENT)
Dept: FAMILY MEDICINE | Facility: CLINIC | Age: 39
End: 2019-06-20

## 2019-06-20 DIAGNOSIS — J01.90 ACUTE SINUSITIS WITH SYMPTOMS > 10 DAYS: Primary | ICD-10-CM

## 2019-06-20 NOTE — TELEPHONE ENCOUNTER
Reason for Call:  Other prescription    Detailed comments: At [facility] OV from 6/11/19 was told by Vero if her sinus infection did not clear up to call in and Carmen would but in an rx for an antibiotic.    Phone Number Patient can be reached at: Home number on file [phone] (home)    Best Time: Anytime     Can we leave a detailed message on this number? YES    Call taken on 6/20/2019 at 1:52 PM by Monica Evans

## 2019-06-20 NOTE — TELEPHONE ENCOUNTER
"See message from patient below. I spoke with patient and reviewed symptoms. States that she feels the same as when she was in the office on 6/11/19. Please advise. Thank you.     Additional Information    Negative: Sounds like a life-threatening emergency to the triager    Negative: Difficulty breathing, and not from stuffy nose (e.g., not relieved by cleaning out the nose)    Negative: SEVERE headache and has fever    Negative: Patient sounds very sick or weak to the triager    Negative: SEVERE sinus pain    Negative: Severe headache    Negative: Redness or swelling on the cheek, forehead, or around the eye    Negative: Fever > 103 F (39.4 C)    Negative: Fever > 101 F (38.3 C) and over 60 years of age    Negative: Fever > 100.0 F (37.8 C) and has diabetes mellitus or a weak immune system (e.g., HIV positive, cancer chemotherapy, organ transplant, splenectomy, chronic steroids)    Negative: Fever > 100.0 F (37.8 C) and bedridden (e.g., nursing home patient, stroke, chronic illness, recovering from surgery)    Negative: Fever present > 3 days (72 hours)    Negative: Fever returns after gone for over 24 hours and symptoms worse or not improved    Negative: Sinus pain (not just congestion) and fever    Negative: Earache    Sinus congestion (pressure, fullness) present > 10 days    Nasal discharge present > 10 days    Negative: Using nasal washes and pain medicine > 24 hours and sinus pain (lower forehead, cheekbone, or eye) persists    Negative: Lots of coughing    Negative: Patient wants to be seen    Negative: Sinus congestion as part of a cold, present < 10 days    Answer Assessment - Initial Assessment Questions  1. LOCATION: \"Where does it hurt?\"       Patient reports the same area as when she was seen in clinic  2. ONSET: \"When did the sinus pain start?\"  (e.g., hours, days)       2 weeks  3. SEVERITY: \"How bad is the pain?\"   (Scale 1-10; mild, moderate or severe)    - MILD (1-3): doesn't interfere with normal " "activities     - MODERATE (4-7): interferes with normal activities (e.g., work or school) or awakens from sleep    - SEVERE (8-10): excruciating pain and patient unable to do any normal activities         mild  4. RECURRENT SYMPTOM: \"Have you ever had sinus problems before?\" If so, ask: \"When was the last time?\" and \"What happened that time?\"       Not asked  5. NASAL CONGESTION: \"Is the nose blocked?\" If so, ask, \"Can you open it or must you breathe through the mouth?\"      Not asked  6. NASAL DISCHARGE: \"Do you have discharge from your nose?\" If so ask, \"What color?\"      yellow  7. FEVER: \"Do you have a fever?\" If so, ask: \"What is it, how was it measured, and when did it start?\"       Yes, low-grade, 99.2  8. OTHER SYMPTOMS: \"Do you have any other symptoms?\" (e.g., sore throat, cough, earache, difficulty breathing)      no  9. PREGNANCY: \"Is there any chance you are pregnant?\" \"When was your last menstrual period?\"      Not asked    Protocols used: SINUS PAIN AND CONGESTION-A-MANDY Palomino, RN  [facility]    "

## 2019-06-21 NOTE — TELEPHONE ENCOUNTER
Ok to treat with abx given our last discussion in clinic. Script sent, please notify.  Electronically Signed By: Carmen Pham PA-C

## 2019-06-21 NOTE — TELEPHONE ENCOUNTER
Left a voicemail for patient notifying her of prescription sent to the pharmacy.  Yara Sorensen MA

## 2019-06-26 ENCOUNTER — OFFICE VISIT (OUTPATIENT)
Dept: FAMILY MEDICINE | Facility: CLINIC | Age: 39
End: 2019-06-26
Payer: COMMERCIAL

## 2019-06-26 VITALS
SYSTOLIC BLOOD PRESSURE: 102 MMHG | WEIGHT: 124 LBS | TEMPERATURE: 99.9 F | BODY MASS INDEX: 19.46 KG/M2 | DIASTOLIC BLOOD PRESSURE: 64 MMHG | HEIGHT: 67 IN | OXYGEN SATURATION: 97 %

## 2019-06-26 DIAGNOSIS — Z11.3 SCREEN FOR STD (SEXUALLY TRANSMITTED DISEASE): Primary | ICD-10-CM

## 2019-06-26 PROCEDURE — 36415 COLL VENOUS BLD VENIPUNCTURE: CPT | Performed by: PHYSICIAN ASSISTANT

## 2019-06-26 PROCEDURE — 87591 N.GONORRHOEAE DNA AMP PROB: CPT | Performed by: PHYSICIAN ASSISTANT

## 2019-06-26 PROCEDURE — 99213 OFFICE O/P EST LOW 20 MIN: CPT | Performed by: PHYSICIAN ASSISTANT

## 2019-06-26 PROCEDURE — 86780 TREPONEMA PALLIDUM: CPT | Performed by: PHYSICIAN ASSISTANT

## 2019-06-26 PROCEDURE — 87491 CHLMYD TRACH DNA AMP PROBE: CPT | Performed by: PHYSICIAN ASSISTANT

## 2019-06-26 PROCEDURE — 87340 HEPATITIS B SURFACE AG IA: CPT | Performed by: PHYSICIAN ASSISTANT

## 2019-06-26 PROCEDURE — 87389 HIV-1 AG W/HIV-1&-2 AB AG IA: CPT | Performed by: PHYSICIAN ASSISTANT

## 2019-06-26 PROCEDURE — 86803 HEPATITIS C AB TEST: CPT | Performed by: PHYSICIAN ASSISTANT

## 2019-06-26 RX ORDER — SPIRONOLACTONE 100 MG/1
TABLET, FILM COATED ORAL
COMMUNITY
Start: 2019-06-25 | End: 2020-11-30

## 2019-06-26 ASSESSMENT — MIFFLIN-ST. JEOR: SCORE: 1260.09

## 2019-06-26 NOTE — PROGRESS NOTES
Subjective     Julisa Tillman is a 39 year old female who presents to clinic today for the following health issues:    HPI   -STD check - states that she was in a relationship for about three months and found out that he was in a relationship with other people as well so she wants to get STD screening just in case.   Reports she had a new bump that showed up about 3 weeks ago and wants to have this checked. Wasn't sure if it's an ingrown hair or not. No drainage, changing or itching. Just the presence of it worries her and especially in light of being in this relationship and wasn't sure if that person was truly being monogamous.  No discharge, pain or other lesions/sores.   Has known this partner since age 12.       Patient Active Problem List   Diagnosis     Anxiety     Anxiety attack     Cervical high risk HPV (human papillomavirus) test positive     Former smoker     Major depression, recurrent (H)     Suicidal ideation     Eating disorder - anorexia, bulimia and exercise bulimia. Issues off/on since age 6.      History reviewed. No pertinent surgical history.    Social History     Tobacco Use     Smoking status: Former Smoker     Packs/day: 0.25     Years: 20.00     Pack years: 5.00     Last attempt to quit: 2018     Years since quittin.0     Smokeless tobacco: Never Used   Substance Use Topics     Alcohol use: No     History reviewed. No pertinent family history.      Current Outpatient Medications   Medication Sig Dispense Refill     amoxicillin-clavulanate (AUGMENTIN) 875-125 MG tablet Take 1 tablet by mouth 2 times daily for 10 days 20 tablet 0     ARIPiprazole (ABILIFY) 2 MG tablet TAKE 1 TABLET(2 MG) BY MOUTH DAILY 30 tablet 0     escitalopram (LEXAPRO) 20 MG tablet Take 1 tablet (20 mg) by mouth daily 90 tablet 0     fluticasone (FLONASE) 50 MCG/ACT spray Spray 1 spray into both nostrils daily       propranolol (INDERAL) 20 MG tablet Take 1 tablet (20 mg) by mouth 3 times daily as needed  "(anxiety) 90 tablet 2     spironolactone (ALDACTONE) 100 MG tablet        traZODone (DESYREL) 50 MG tablet Take 1 tablet (50 mg) by mouth nightly as needed for sleep 30 tablet 2     No Known Allergies      Reviewed and updated as needed this visit by Provider  Tobacco  Allergies  Meds  Med Hx  Surg Hx  Fam Hx  Soc Hx        Review of Systems   ROS COMP: Constitutional, skin, psych, gi and gu systems are negative, except as otherwise noted.      Objective    /64 (BP Location: Right arm, Cuff Size: Adult Regular)   Temp 99.9  F (37.7  C) (Oral)   Ht 1.702 m (5' 7\")   Wt 56.2 kg (124 lb)   SpO2 97%   BMI 19.42 kg/m    Body mass index is 19.42 kg/m .  Physical Exam   GENERAL: healthy, alert and no distress  SKIN: shows me a tiny flesh-colored papule located along distal junction of labia/proximal buttock without redness, ulceration, bleeding or any tenderness. Suspect this is a very small in-grown hair versus tiny gland.   (female): normal female external genitalia, normal urethral meatus, vaginal mucosa, normal cervix/adnexa/uterus without masses or discharge    Diagnostic Test Results:  Labs reviewed in Epic        Assessment & Plan       ICD-10-CM    1. Screen for STD (sexually transmitted disease) Z11.3 Neisseria gonorrhoeae PCR     Chlamydia trachomatis PCR     **Hepatitis C Screen Reflex to RNA FUTURE anytime     Hepatitis B surface antigen     Treponema Abs w Reflex to RPR and Titer     HIV Antigen Antibody Combo   Requesting overall STD testing as not sure if recent partner was monogamous. Provided reassurance tiny lump she noted is not c/w HSV and is likely a little ingrown hair.   See Patient Instructions  Patient in agreement with plan.     Patient Instructions   Tiny lump is likely an ingrown hair or small swollen gland, but no evidence for HSV.  Obtain STD screenings and notify of results when available.       Return in about 20 days (around 7/16/2019) for OB/GYN as planned.    Carmen ALONSO" HUMBLE Pham  [facility]

## 2019-06-26 NOTE — PATIENT INSTRUCTIONS
Tiny lump is likely an ingrown hair or small swollen gland, but no evidence for HSV.  Obtain STD screenings and notify of results when available.

## 2019-06-27 LAB
HBV SURFACE AG SERPL QL IA: NONREACTIVE
HCV AB SERPL QL IA: NONREACTIVE
HIV 1+2 AB+HIV1 P24 AG SERPL QL IA: NONREACTIVE

## 2019-06-28 ENCOUNTER — TELEPHONE (OUTPATIENT)
Dept: FAMILY MEDICINE | Facility: CLINIC | Age: 39
End: 2019-06-28

## 2019-06-28 LAB — T PALLIDUM AB SER QL: NONREACTIVE

## 2019-06-28 NOTE — TELEPHONE ENCOUNTER
Patient requesting results.  I see that some are still pending.  Marbella Rao RN- Triage [employer]

## 2019-06-28 NOTE — TELEPHONE ENCOUNTER
Reason for Call:  Other call back- Results    Detailed comments: Pt called this afternoon and would like Carmen Pham or a nurse to give her a call back in regards to her test results that were taken on Wed. 06/26. Please give pt a call back when you can with these results. Thank you.    Phone Number Patient can be reached at: Home number on file [phone] (home)    Best Time:     Can we leave a detailed message on this number? YES    Call taken on 6/28/2019 at 12:26 PM by Inga Martin

## 2019-06-28 NOTE — TELEPHONE ENCOUNTER
Patient called back. I let her know that the ones that are done are normal. Still waiting on GC and Chlamydia.    Marbella Rao RN- Triage [employer]

## 2019-06-30 ENCOUNTER — APPOINTMENT (OUTPATIENT)
Dept: GENERAL RADIOLOGY | Facility: CLINIC | Age: 39
End: 2019-06-30
Attending: PHYSICIAN ASSISTANT
Payer: COMMERCIAL

## 2019-06-30 ENCOUNTER — HOSPITAL ENCOUNTER (EMERGENCY)
Facility: CLINIC | Age: 39
Discharge: HOME | End: 2019-06-30
Attending: PHYSICIAN ASSISTANT | Admitting: PHYSICIAN ASSISTANT
Payer: COMMERCIAL

## 2019-06-30 VITALS
WEIGHT: 128 LBS | RESPIRATION RATE: 16 BRPM | TEMPERATURE: 98.4 F | OXYGEN SATURATION: 98 % | HEIGHT: 67 IN | DIASTOLIC BLOOD PRESSURE: 80 MMHG | SYSTOLIC BLOOD PRESSURE: 122 MMHG | HEART RATE: 76 BPM | BODY MASS INDEX: 20.09 KG/M2
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
## 2019-06-30 DIAGNOSIS — S41.151A DOG BITE OF RIGHT UPPER EXTREMITY, INITIAL ENCOUNTER: ICD-10-CM

## 2019-06-30 PROCEDURE — 73090 X-RAY EXAM OF FOREARM: CPT | Mod: RT

## 2019-06-30 PROCEDURE — 99283 EMERGENCY DEPT VISIT LOW MDM: CPT

## 2019-06-30 PROCEDURE — 12001 RPR S/N/AX/GEN/TRNK 2.5CM/<: CPT

## 2019-06-30 PROCEDURE — 25000132 ZZH RX MED GY IP 250 OP 250 PS 637: Performed by: PHYSICIAN ASSISTANT

## 2019-06-30 PROCEDURE — 25000131 ZZH RX MED GY IP 250 OP 636 PS 637: Performed by: PHYSICIAN ASSISTANT

## 2019-06-30 RX ORDER — DOXYCYCLINE 100 MG/1
100 CAPSULE ORAL 2 TIMES DAILY
Qty: 14 CAPSULE | Refills: 0 | Status: SHIPPED | OUTPATIENT
Start: 2019-06-30 | End: 2019-07-16

## 2019-06-30 RX ORDER — DOXYCYCLINE 100 MG/1
100 CAPSULE ORAL ONCE
Status: COMPLETED | OUTPATIENT
Start: 2019-06-30 | End: 2019-06-30

## 2019-06-30 RX ORDER — IBUPROFEN 600 MG/1
600 TABLET, FILM COATED ORAL ONCE
Status: COMPLETED | OUTPATIENT
Start: 2019-06-30 | End: 2019-06-30

## 2019-06-30 RX ORDER — ONDANSETRON 4 MG/1
4 TABLET, ORALLY DISINTEGRATING ORAL ONCE
Status: COMPLETED | OUTPATIENT
Start: 2019-06-30 | End: 2019-06-30

## 2019-06-30 RX ADMIN — ONDANSETRON 4 MG: 4 TABLET, ORALLY DISINTEGRATING ORAL at 19:12

## 2019-06-30 RX ADMIN — IBUPROFEN 600 MG: 600 TABLET ORAL at 19:10

## 2019-06-30 RX ADMIN — DOXYCYCLINE HYCLATE 100 MG: 100 CAPSULE ORAL at 19:10

## 2019-06-30 ASSESSMENT — MIFFLIN-ST. JEOR: SCORE: 1278.23

## 2019-06-30 ASSESSMENT — ENCOUNTER SYMPTOMS: WOUND: 1

## 2019-06-30 NOTE — ED AVS SNAPSHOT
[facility] Emergency Department  [address]  [facility] [address]  Phone:  [phone]  Fax:  [phone]                                    Julisa Tillman   MRN: [id number]    Department:  [facility] Emergency Department   Date of Visit:  6/30/2019           After Visit Summary Signature Page    I have received my discharge instructions, and my questions have been answered. I have discussed any challenges I see with this plan with the nurse or doctor.    ..........................................................................................................................................  Patient/Patient Representative Signature      ..........................................................................................................................................  Patient Representative Print Name and Relationship to Patient    ..................................................               ................................................  Date                                   Time    ..........................................................................................................................................  Reviewed by Signature/Title    ...................................................              ..............................................  Date                                               Time          22EPIC Rev 08/18

## 2019-06-30 NOTE — ED PROVIDER NOTES
"  History     Chief Complaint:  Dog Bite    HPI   Julisa Tillman is a 39 year old female who presents with a dog bite. She states she walked into her friend's house and her doberman latched on to her right forearm. She states that it is really painful but she declines any redness or fevers. The patient denies any other symptoms. She has not taken anything for her pain. She states she is currently on Augmentin for a sinus infection. The patient's tetanus is up to date. She says that the friend's dog's immunizations, including rabies are UTD.     Allergies:  No Known Allergies     Medications:    Abilify  lexapro  Propanolol  Trazodone  Augmentin     Past Medical History:    Depression  Eating disorder- anorexia  generalized anxiety disorder  suicidal ideation    Past Surgical History:    ENT surgery    Family History:    No past pertinent family history.    Social History:  Smoking Status: Former Smoker- quit 2018  Smokeless Tobacco: Never Used  Alcohol Use: Positive  Marital Status:  Legally       Review of Systems   Skin: Positive for wound.   All other systems reviewed and are negative.    Physical Exam     Patient Vitals for the past 24 hrs:   BP Temp Pulse Heart Rate Resp SpO2 Height Weight   06/30/19 1936 122/80 98.4  F (36.9  C) 76 -- 16 -- -- --   06/30/19 1838 121/74 99  F (37.2  C) -- 85 16 98 % 1.702 m (5' 7\") 58.1 kg (128 lb)       Physical Exam  General: Alert and interactive. Appears well.   Head: Atraumatic, without obvious lesion, abrasion, hematoma.   Eyes: The pupils are equal and round. No scleral icterus.   ENT: No obvious abnormalities to the ears or nose. Mucous membranes moist.   Neck:Trachea is in the midline. No obvious swelling to the neck. Full range of motion.   CV: Regular rate. Extremities well perfused. Capillary refill brisk in fingers. Radial pulsations are normal.   Resp: Non-labored, no retractions or accessory muscle use.     GI: Abdomen is not distended.   MS: Moving all " extremities well. Patient is able to flex and extend and move all fingers and wrist.   Skin: Small puncture wound to right forearm with slight surrounding erythema but no streaking. There is tenderness to palpitation.  Neuro: Alert and oriented x 3. Non-focal examination.    Psych: Awake. Alert.  Normal affect. Appropriate interactions.    Emergency Department Course   Imaging:  The imaging results were communicated with the patient, who voiced understanding.    XR radius/ Ulna PA & LAT:  No acute osseous abnormality demonstrated.  Reading per radiology    Procedures:    Laceration Repair        LACERATION:  A simple Contaminated 1 cm puncture wound.      LOCATION:  right forearm      FUNCTION:  Distally sensation are intact.      ANESTHESIA:  Local using lidocaine 1% total of 2.5 mLs      PREPARATION:  Irrigation with Tap Water      DEBRIDEMENT:  no debridement      CLOSURE:   Skin closed with Steri-strips.    Interventions:  1856 doxycycline 100 mg oral  1856 Advil 600 mg oral  1911 Zofran 4 mg oral    Emergency Department Course:     Nursing notes and vitals reviewed.    1852 I performed an exam of the patient as documented above.     1907 I helped irrigate the wound.    1919 The patient was sent for a XR while in the emergency department, results above.     1934 I personally reviewed the imaging results with the patient and answered all related questions prior to discharge.      Impression & Plan      Medical Decision Making:  Julisa Tillman is a 39 year old female who presents for evaluation of a dog bite to the right forearm. The workup here in the ED shows no signs of compartment syndrome, significant lacerations, tendon or bone injury. No sausage shaped digits, no pain with passive extension of fingers, no tenderness to palpation of the tendons, and no streaking. No signs of foreign body or teeth in wound. Animal is known so will have them observe for signs of rabies. No indication for rabies shots at this  time. Tetanus is up to date. The wounds were scrubbed and washed out with high pressure irrigation. I will start the patient on Doxycycline (she is just completing course of Augmentin for sinusitis) and have them observe for signs of infection, including pain, redness, warmth, red streaks, and any other concerns. First dose given here and patient educated on side effects, including increase chance of sun burn. May take tylenol/ibuprofen for pain. Suggested primary care wound recheck in 2 days and hand surgery information given for any complications.     Diagnosis:    ICD-10-CM    1. Dog bite of right upper extremity, initial encounter S41.151A     W54.0XXA      Disposition:   The patient is discharged to home.    Discharge Medications:  START taking      Dose / Directions   doxycycline hyclate 100 MG capsule  Commonly known as:  VIBRAMYCIN      Dose:  100 mg  Take 1 capsule (100 mg) by mouth 2 times daily for 7 days  Quantity:  14 capsule  Refills:  0         Scribe Disclosure:  I, Maria Elena Colmenares, am serving as a scribe at 6:47 PM on 6/30/2019 to document services personally performed by Reian Malcolm PA-C based on my observations and the provider's statements to me.  [facility] EMERGENCY DEPARTMENT       Reina Malcolm PA-C  06/30/19 [id number]

## 2019-07-01 ENCOUNTER — NURSE TRIAGE (OUTPATIENT)
Dept: NURSING | Facility: CLINIC | Age: 39
End: 2019-07-01

## 2019-07-01 NOTE — TELEPHONE ENCOUNTER
Patient was notified with information noted by provider and agreed with plan.  FIDEL JinN, RN  [employer] Triage

## 2019-07-01 NOTE — DISCHARGE INSTRUCTIONS
Follow up with primary care in 1-2 days for wound recheck.   Begin Doxycycline, as you have been on Augmentin now for your sinuses. If you want to complete your Augmentin, that makes sense as well. Be careful of sunlight.   Take a probiotic.   Return here for increase in swelling, redness, pus coming out of the wound, fevers/chills.   Call [facility] (CARD GIVEN) for check sometime within the week.

## 2019-07-01 NOTE — TELEPHONE ENCOUNTER
She wanted to change the dressing. I advised she reinforce it to avoid disturbing the healing process. They can change it at tomorrow's appointment. She was treated for a dog bite yesterday.  Afua Hodgson RN-[facility]

## 2019-07-01 NOTE — TELEPHONE ENCOUNTER
Please notify pt that all STD screening was normal/negative.  Electronically Signed By: Carmen Pham PA-C

## 2019-07-02 ENCOUNTER — OFFICE VISIT (OUTPATIENT)
Dept: FAMILY MEDICINE | Facility: CLINIC | Age: 39
End: 2019-07-02
Payer: COMMERCIAL

## 2019-07-02 VITALS
TEMPERATURE: 98.5 F | SYSTOLIC BLOOD PRESSURE: 112 MMHG | BODY MASS INDEX: 20.05 KG/M2 | DIASTOLIC BLOOD PRESSURE: 70 MMHG | HEART RATE: 74 BPM | WEIGHT: 128 LBS | OXYGEN SATURATION: 98 %
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
## 2019-07-02 PROCEDURE — 99213 OFFICE O/P EST LOW 20 MIN: CPT | Performed by: FAMILY MEDICINE

## 2019-07-02 NOTE — PROGRESS NOTES
Subjective     Julisa Tillman is a 39 year old female who presents to clinic today for the following health issues:    HPI   ED/UC Followup:    Facility:  [facility]  Date of visit:   Reason for visit: dog bite  Current Status: swelling seems to have gone down, itchy.      Julisa was seen in the emergency department on 2019 for dog bite to her right forearm.  Apparently, she is walking her friend's house and a Doberman latched on her right forearm.  It left one puncture wound on the top of her lower forearm.  At the time of bite, she was taking Augmentin for a sinus infection.  This antibiotic was changed to doxycycline to prophylactically treat for potential infection of dog bite.  The dog's immunizations were up-to-date.  Her tetanus is up-to-date.    She is tolerating doxycycline without adverse effect.  The swelling of her arm has decreased but area is itchy.  Initially, puncture wound was oozing through the dressing but has since stopped.  She denies any fevers, chills, nausea, or vomiting.  She denies any numbness or tingling in her hand and no loss of strength.    Patient Active Problem List   Diagnosis     Anxiety     Anxiety attack     Cervical high risk HPV (human papillomavirus) test positive     Former smoker     Major depression, recurrent (H)     Suicidal ideation     Eating disorder - anorexia, bulimia and exercise bulimia. Issues off/on since age 6.      Past Surgical History:   Procedure Laterality Date     ENT SURGERY         Social History     Tobacco Use     Smoking status: Former Smoker     Packs/day: 0.25     Years: 20.00     Pack years: 5.00     Last attempt to quit: 2018     Years since quittin.0     Smokeless tobacco: Never Used   Substance Use Topics     Alcohol use: No     History reviewed. No pertinent family history.          Reviewed and updated as needed this visit by Provider  Tobacco  Allergies  Meds  Problems  Med Hx  Surg Hx  Fam Hx          Review of Systems   ROS COMP: Constitutional, HEENT, cardiovascular, pulmonary, gi and gu systems are negative, except as otherwise noted.      Objective    /70   Pulse 74   Temp 98.5  F (36.9  C) (Oral)   Wt 58.1 kg (128 lb)   LMP 06/16/2019   SpO2 98%   BMI 20.05 kg/m    Body mass index is 20.05 kg/m .  Physical Exam   GENERAL: healthy, alert and no distress  MSK: Right arm, wrist, and finger range of motion all intact.  Sensation intact as well.  SKIN: Small puncture wound to right forearm without surrounding erythema or swelling.  Covered with multiple Steri-Strips.  PSYCH: mentation appears normal, affect normal/bright    Diagnostic Test Results:  none         Assessment & Plan     1. Dog bite, subsequent encounter: Appears to be healing well.  No sign of infection.  Continue course of doxycycline.  Keep area clean and allow Steri-Strips to fall off on their own.  Okay to leave uncovered if desired.  If any increase in redness, warmth, swelling, or drainage, follow-up in clinic.        Return in about 1 week (around 7/9/2019) for follow up if symptoms not improving.    Dustin Smith, DO  [facility]

## 2019-07-05 ENCOUNTER — HOSPITAL ENCOUNTER (OUTPATIENT)
Dept: MAMMOGRAPHY | Facility: CLINIC | Age: 39
Discharge: HOME | End: 2019-07-05
Attending: PHYSICIAN ASSISTANT | Admitting: PHYSICIAN ASSISTANT
Payer: COMMERCIAL

## 2019-07-05 DIAGNOSIS — Z12.31 ENCOUNTER FOR SCREENING MAMMOGRAM FOR BREAST CANCER: ICD-10-CM

## 2019-07-05 PROCEDURE — 77063 BREAST TOMOSYNTHESIS BI: CPT

## 2019-07-05 NOTE — RESULT ENCOUNTER NOTE
Result(s) was/were reviewed with pt by RN, see tele enc 6/28.  Electronically Signed By: Carmen Pham PA-C

## 2019-07-16 ENCOUNTER — OFFICE VISIT (OUTPATIENT)
Dept: OBGYN | Facility: CLINIC | Age: 39
End: 2019-07-16
Payer: COMMERCIAL

## 2019-07-16 VITALS
HEIGHT: 67 IN | DIASTOLIC BLOOD PRESSURE: 60 MMHG | BODY MASS INDEX: 20.4 KG/M2 | WEIGHT: 130 LBS | SYSTOLIC BLOOD PRESSURE: 98 MMHG

## 2019-07-16 DIAGNOSIS — Z86.19 HISTORY OF HPV INFECTION: Primary | ICD-10-CM

## 2019-07-16 PROCEDURE — 57456 ENDOCERV CURETTAGE W/SCOPE: CPT | Performed by: OBSTETRICS & GYNECOLOGY

## 2019-07-16 PROCEDURE — 88305 TISSUE EXAM BY PATHOLOGIST: CPT | Performed by: OBSTETRICS & GYNECOLOGY

## 2019-07-16 ASSESSMENT — MIFFLIN-ST. JEOR: SCORE: 1287.31

## 2019-07-16 NOTE — NURSING NOTE
"Chief Complaint   Patient presents with     Colposcopy     HPV 16 +       Initial BP 98/60 (BP Location: Left arm, Patient Position: Chair, Cuff Size: Adult Regular)   Ht 1.702 m (5' 7\")   Wt 59 kg (130 lb)   LMP 07/09/2019   Breastfeeding? No   BMI 20.36 kg/m   Estimated body mass index is 20.36 kg/m  as calculated from the following:    Height as of this encounter: 1.702 m (5' 7\").    Weight as of this encounter: 59 kg (130 lb).  BP completed using cuff size: regular    Questioned patient about current smoking habits.  Pt. has never smoked.      No obstetric history on file.    The following HM Due: NONE  Barbie Spaulding CMA    "

## 2019-07-16 NOTE — PROGRESS NOTES
39 year old P4. presents for colposcopy.      Indication for procedure:HPV (Human Papillomavirus) positive.  16+ in 5/2108 with normal Pap    There are many types of HPV, but we test Pap samples for the high-risk types. HPV can be the cause of an abnormal Pap smear result.  The high-risk types of HPV can also be related to the potential development of cervical cancer if not monitored and/or treated appropriately  Prior history of cervical dysplasia: No    No Hx of abnormal Paps    Prior Colposcopy history: No  Prior LEEP:No  Patient's last menstrual period was 07/09/2019.    Tobacco: No  Gardasil vaccination status:No  Pregnancy test:   She denies the possibility of pregnancy     Discussed nature of HPV related infection, natural history and association with cervical dysplasia.  Procedure for colposcopy and biopsy was explained to the patient and consent obtained.  All the patient's questions were answered.    PROCEDURE:  COLPOSCOPY  After a procedural timeout was taken, she was positioned in dorsal lithotomy and a speculum was inserted to allow visualization of the cervix. A 5% acetic acid solution was applied to the ectocervix with large swabs. Lugols solution was also applied.  Colposcopic examination was then undertaken of the cervix, distal vaginal canal and vaginal fornices.    FINDINGS:Physical Exam   Genitourinary:           Procedures      Plan: Specimens labelled and sent to pathology., Will base further treatment on pathology findings. and post biopsy instructions given to patient.      Derrell Brar MD

## 2019-07-18 LAB — COPATH REPORT: NORMAL

## 2019-08-06 DIAGNOSIS — F41.9 ANXIETY: ICD-10-CM

## 2019-08-06 DIAGNOSIS — F33.1 MODERATE RECURRENT MAJOR DEPRESSION (H): ICD-10-CM

## 2019-08-07 RX ORDER — ARIPIPRAZOLE 2 MG/1
TABLET ORAL
Qty: 30 TABLET | Refills: 0 | Status: SHIPPED | OUTPATIENT
Start: 2019-08-07

## 2019-08-07 NOTE — TELEPHONE ENCOUNTER
Temp refill only - please check in on patient as she was supposed to follow up with psychiatry for ongoing care. Was she able to get an appt?   Electronically Signed By: Carmen Pham PA-C

## 2019-08-07 NOTE — TELEPHONE ENCOUNTER
Routing refill request to provider for review/approval because:  Review for refills and patient follow up may be needed.     Rosa Maria Eaton R.N.

## 2019-08-07 NOTE — TELEPHONE ENCOUNTER
Requested Prescriptions   Pending Prescriptions Disp Refills     ARIPiprazole (ABILIFY) 2 MG tablet [Pharmacy Med Name: ARIPIPRAZOLE 2MG TABLETS]  Last Written Prescription Date:  6/11/2019  Last Fill Quantity: 30 tablet,  # refills: 0   Last office visit: 7/2/2019 with prescribing provider:  Sarah     Future Office Visit:       30 tablet 0     Sig: TAKE 1 TABLET(2 MG) BY MOUTH DAILY       Antipsychotic Medications Passed - 8/6/2019  4:56 PM        Passed - Blood pressure under 140/90 in past 12 months     BP Readings from Last 3 Encounters:   07/16/19 98/60   07/02/19 112/70   06/30/19 122/80                 Passed - Patient is 12 years of age or older        Passed - Lipid panel on file within the past 12 months     Recent Labs   Lab Test 12/25/18  0800   CHOL 186   TRIG 106   HDL 60   *   NHDL 126               Passed - CBC on file in past 12 months     Recent Labs   Lab Test 12/25/18  0800   WBC 8.5   RBC 4.50   HGB 14.1   HCT 42.4                    Passed - Heart Rate on file within past 12 months     Pulse Readings from Last 3 Encounters:   07/02/19 74   06/30/19 76   06/11/19 91               Passed - A1c or Glucose on file in past 12 months     Recent Labs   Lab Test 12/25/18  0800   GLC 89       Please review patients last 3 weights. If a weight gain of >10 lbs exists, you may refill the prescription once after instructing the patient to schedule an appointment within the next 30 days.    Wt Readings from Last 3 Encounters:   07/16/19 59 kg (130 lb)   07/02/19 58.1 kg (128 lb)   06/30/19 58.1 kg (128 lb)             Passed - Medication is active on med list        Passed - Patient is not pregnant        Passed - No positve pregnancy test on file in past 12 months        Passed - Recent (6 mo) or future (30 days) visit within the authorizing provider's specialty     Patient had office visit in the last 6 months or has a visit in the next 30 days with authorizing provider or within the  "authorizing provider's specialty.  See \"Patient Info\" tab in inbasket, or \"Choose Columns\" in Meds & Orders section of the refill encounter.            "

## 2019-08-13 NOTE — TELEPHONE ENCOUNTER
Called [phone] and spoke with pt.  She did get in to psych and has her follow up appt today.  She has been rxd meds with them and is not sure why the refill came to us.  Yael Bagley

## 2019-10-31 DIAGNOSIS — F33.1 MODERATE RECURRENT MAJOR DEPRESSION (H): ICD-10-CM

## 2019-10-31 DIAGNOSIS — F41.9 ANXIETY: ICD-10-CM

## 2019-10-31 RX ORDER — ARIPIPRAZOLE 2 MG/1
TABLET ORAL
Qty: 30 TABLET | Refills: 0
Start: 2019-10-31

## 2019-10-31 NOTE — TELEPHONE ENCOUNTER
Please see encounter from 8/6/19--refills should be going to her psych provider and not PCP. Note sent to pharmacy. Rosa Maria Eaton R.N.

## 2019-10-31 NOTE — TELEPHONE ENCOUNTER
"Requested Prescriptions   Pending Prescriptions Disp Refills     ARIPiprazole (ABILIFY) 2 MG tablet [Pharmacy Med Name: ARIPIPRAZOLE (2MG)  .Last Written Prescription Date:  8/7/19  Last Fill Quantity: 30,  # refills: 0   Last Office Visit: 7/2/2019   Future Office Visit:      TABS] 30 tablet 0     Sig: TAKE ONE TABLET BY MOUTH EVERY DAY       Antipsychotic Medications Passed - 10/31/2019  4:31 AM        Passed - Blood pressure under 140/90 in past 12 months     BP Readings from Last 3 Encounters:   07/16/19 98/60   07/02/19 112/70   06/30/19 122/80           Passed - Patient is 12 years of age or older        Passed - Lipid panel on file within the past 12 months     Recent Labs   Lab Test 12/25/18  0800   CHOL 186   TRIG 106   HDL 60   *   NHDL 126           Passed - CBC on file in past 12 months     Recent Labs   Lab Test 12/25/18  0800   WBC 8.5   RBC 4.50   HGB 14.1   HCT 42.4              Passed - Heart Rate on file within past 12 months     Pulse Readings from Last 3 Encounters:   07/02/19 74   06/30/19 76   06/11/19 91           Passed - A1c or Glucose on file in past 12 months     Recent Labs   Lab Test 12/25/18  0800   GLC 89       Please review patients last 3 weights. If a weight gain of >10 lbs exists, you may refill the prescription once after instructing the patient to schedule an appointment within the next 30 days.    Wt Readings from Last 3 Encounters:   07/16/19 59 kg (130 lb)   07/02/19 58.1 kg (128 lb)   06/30/19 58.1 kg (128 lb)           Passed - Medication is active on med list        Passed - Patient is not pregnant        Passed - No positve pregnancy test on file in past 12 months        Passed - Recent (6 mo) or future (30 days) visit within the authorizing provider's specialty     Patient had office visit in the last 6 months or has a visit in the next 30 days with authorizing provider or within the authorizing provider's specialty.  See \"Patient Info\" tab in inbasket, or " "\"Choose Columns\" in Meds & Orders section of the refill encounter.              "

## 2020-02-04 ENCOUNTER — OFFICE VISIT (OUTPATIENT)
Dept: FAMILY MEDICINE | Facility: CLINIC | Age: 40
End: 2020-02-04
Payer: COMMERCIAL

## 2020-02-04 VITALS
SYSTOLIC BLOOD PRESSURE: 118 MMHG | OXYGEN SATURATION: 98 % | DIASTOLIC BLOOD PRESSURE: 74 MMHG | BODY MASS INDEX: 21.35 KG/M2 | HEIGHT: 67 IN | WEIGHT: 136 LBS | HEART RATE: 98 BPM | TEMPERATURE: 97.9 F

## 2020-02-04 DIAGNOSIS — J06.9 VIRAL UPPER RESPIRATORY INFECTION: ICD-10-CM

## 2020-02-04 DIAGNOSIS — R50.9 FEVER, UNSPECIFIED FEVER CAUSE: Primary | ICD-10-CM

## 2020-02-04 LAB
FLUAV+FLUBV AG SPEC QL: NEGATIVE
FLUAV+FLUBV AG SPEC QL: NEGATIVE
SPECIMEN SOURCE: NORMAL

## 2020-02-04 PROCEDURE — 99213 OFFICE O/P EST LOW 20 MIN: CPT | Performed by: NURSE PRACTITIONER

## 2020-02-04 PROCEDURE — 87804 INFLUENZA ASSAY W/OPTIC: CPT | Performed by: NURSE PRACTITIONER

## 2020-02-04 ASSESSMENT — MIFFLIN-ST. JEOR: SCORE: 1314.52

## 2020-02-04 NOTE — PATIENT INSTRUCTIONS
Results for orders placed or performed in visit on 02/04/20   Influenza A/B antigen     Status: None   Result Value Ref Range    Influenza A/B Agn Specimen Nasal     Influenza A Negative NEG^Negative    Influenza B Negative NEG^Negative

## 2020-02-04 NOTE — PROGRESS NOTES
Subjective     Julisa Tillman is a 39 year old female who presents to clinic today for the following health issues:    HPI     Acute Illness   Acute illness concerns: Fever  Onset: x 2 days ago    Fever: YES- low grade    Chills/Sweats: YES    Headache (location?): YES    Sinus Pressure:no    Conjunctivitis:  no    Ear Pain: no    Rhinorrhea: a little      Congestion: YES- a little     Sore Throat: no      Cough: YES    Wheeze: no     Decreased Appetite: YES    Nausea: no     Vomiting: no     Diarrhea:  no     Dysuria/Freq.: no     Fatigue/Achiness: YES    Sick/Strep Exposure: YES- exposed to daughter with Influenza B     Therapies Tried and outcome: Ibuprofen this morning       Patient Active Problem List   Diagnosis     Anxiety     Anxiety attack     Cervical high risk HPV (human papillomavirus) test positive     Former smoker     Major depression, recurrent (H)     Suicidal ideation     Eating disorder - anorexia, bulimia and exercise bulimia. Issues off/on since age 6.      Past Surgical History:   Procedure Laterality Date     ENT SURGERY         Social History     Tobacco Use     Smoking status: Former Smoker     Packs/day: 0.25     Years: 20.00     Pack years: 5.00     Last attempt to quit: 2018     Years since quittin.6     Smokeless tobacco: Never Used   Substance Use Topics     Alcohol use: No     No family history on file.      Current Outpatient Medications   Medication Sig Dispense Refill     ARIPiprazole (ABILIFY) 2 MG tablet TAKE 1 TABLET(2 MG) BY MOUTH DAILY 30 tablet 0     escitalopram (LEXAPRO) 20 MG tablet Take 1 tablet (20 mg) by mouth daily 90 tablet 0     fluticasone (FLONASE) 50 MCG/ACT spray Spray 1 spray into both nostrils daily       propranolol (INDERAL) 20 MG tablet Take 1 tablet (20 mg) by mouth 3 times daily as needed (anxiety) (Patient not taking: Reported on 2019) 90 tablet 2     spironolactone (ALDACTONE) 100 MG tablet        traZODone (DESYREL) 50 MG tablet Take 1  "tablet (50 mg) by mouth nightly as needed for sleep (Patient not taking: Reported on 7/16/2019) 30 tablet 2     No Known Allergies    Reviewed and updated as needed this visit by Provider         Review of Systems   ROS COMP: Constitutional, HEENT, cardiovascular, pulmonary, gi and gu systems are negative, except as otherwise noted.      Objective    /74 (BP Location: Right arm, Patient Position: Sitting, Cuff Size: Adult Regular)   Pulse 98   Temp 97.9  F (36.6  C) (Oral)   Ht 1.702 m (5' 7\")   Wt 61.7 kg (136 lb)   SpO2 98%   BMI 21.30 kg/m    Body mass index is 21.3 kg/m .  Physical Exam   GENERAL: healthy, alert and no distress  EYES: Eyes grossly normal to inspection, PERRL and conjunctivae and sclerae normal  HENT: ear canals and TM's normal, nose and mouth without ulcers or lesions  NECK: no adenopathy, no asymmetry  RESP: lungs clear to auscultation - no rales, rhonchi or wheezes  CV: regular rate and rhythm, normal S1 S2, no S3 or S4, no murmur  PSYCH: mentation appears normal, affect normal/bright            Assessment & Plan     Julisa was seen today for fever.    Diagnoses and all orders for this visit:    Fever, unspecified fever cause  -     Influenza A/B antigen - Negative   Results for orders placed or performed in visit on 02/04/20   Influenza A/B antigen     Status: None   Result Value Ref Range    Influenza A/B Agn Specimen Nasal     Influenza A Negative NEG^Negative    Influenza B Negative NEG^Negative       Viral upper respiratory infection  Patient education completed regarding viral cause, typical course, symptomatic treatment and when to follow-up.   Increase fluids and rest.         Return in about 1 week (around 2/11/2020) for No improvement or sooner with worsening symptoms.    Charlene Zavala, KO[facility] [place]      "

## 2020-04-30 DIAGNOSIS — N92.6 IRREGULAR PERIODS: ICD-10-CM

## 2020-04-30 RX ORDER — LEVONORGESTREL/ETHIN.ESTRADIOL 0.1-0.02MG
1 TABLET ORAL DAILY
Qty: 84 TABLET | Refills: 3 | Status: SHIPPED | OUTPATIENT
Start: 2020-04-30 | End: 2020-04-30

## 2020-04-30 RX ORDER — LEVONORGESTREL/ETHIN.ESTRADIOL 0.1-0.02MG
1 TABLET ORAL DAILY
Qty: 84 TABLET | Refills: 3 | Status: SHIPPED | OUTPATIENT
Start: 2020-04-30 | End: 2020-12-30

## 2020-04-30 NOTE — TELEPHONE ENCOUNTER
Refill given.  Please advise Julisa that she should ensure she is not pregnant prior to starting her oral contraceptive pills.      Chart reviewed.  Rx sent to pt's preferred pharmacy.       [id number] - [place], [place] - [address] AT [facility] [address] [facility] [address]  [id number] - [place], [place] - [address] [facility] [address] [facility] [address] [place]  [place], [place] - [address] [facility]    Thiago Pennington MD

## 2020-04-30 NOTE — TELEPHONE ENCOUNTER
Routing refill request to provider for review/approval because:  Drug not active on patient's medication list  FIDEL GomezN, RN  [facility]

## 2020-04-30 NOTE — TELEPHONE ENCOUNTER
Spoke with patient. She reports that she is not currently pregnant, she would just like to restart her OCP. Rx was sent to [facility], however, patient would like [facility] instead. Rx reordered and sent to [facility]. Call placed to [facility] and order cancelled.  FIDEL GomezN, RN  [facility]

## 2020-04-30 NOTE — TELEPHONE ENCOUNTER
Reason for call:  Other   Patient called regarding (reason for call): call back  Additional comments: Patient requesting birth control    Phone number to reach patient:  Home number on file [phone] (home)    Best Time:  Anytime    Can we leave a detailed message on this number?  YES    Travel screening: Not Applicable

## 2020-08-15 ENCOUNTER — RESULTS ONLY (OUTPATIENT)
Dept: LAB | Age: 40
End: 2020-08-15

## 2020-08-15 ENCOUNTER — NURSE TRIAGE (OUTPATIENT)
Dept: NURSING | Facility: CLINIC | Age: 40
End: 2020-08-15

## 2020-08-15 ENCOUNTER — OFFICE VISIT (OUTPATIENT)
Dept: URGENT CARE | Facility: URGENT CARE | Age: 40
End: 2020-08-15
Payer: COMMERCIAL

## 2020-08-15 ENCOUNTER — VIRTUAL VISIT (OUTPATIENT)
Dept: FAMILY MEDICINE | Facility: OTHER | Age: 40
End: 2020-08-15
Payer: COMMERCIAL

## 2020-08-15 DIAGNOSIS — Z53.9 ERRONEOUS ENCOUNTER--DISREGARD: Primary | ICD-10-CM

## 2020-08-15 PROCEDURE — 99421 OL DIG E/M SVC 5-10 MIN: CPT | Performed by: PHYSICIAN ASSISTANT

## 2020-08-15 NOTE — TELEPHONE ENCOUNTER
Caller had just done [email or web address] and received recommendation to schedule covid test.  She has a sore throat and fever.    Caller is to get a plane this afternoon to take her chid to school and she is asking if she should fly. Advised no, she should not be getting on a plane.    I advised her to follow recommendation outline in her [facility] visit, regarding staying home and self isolate per the [facility] visit.    Transferred caller back to scheduling line to schedule COVID test.    Aissatou Gerard RN  [facility]

## 2020-08-15 NOTE — PROGRESS NOTES
"Date: 08/15/2020 10:05:31  Clinician: Jarrett Henson  Clinician NPI: [id number]  Patient: Julisa Tillman  Patient : 1980  Patient Address: [address] [place] [address]  Patient Phone: [phone]  Visit Protocol: URI  Patient Summary:  Julisa is a 40 year old ( : 1980 ) female who initiated a Visit for COVID-19 (Coronavirus) evaluation and screening. When asked the question \"Please sign me up to receive news, health information and promotions from [employer].\", Julisa responded \"No\".    Julisa states her symptoms started today.   Her symptoms consist of a headache, chills, a sore throat, nasal congestion, facial pain or pressure, and malaise. Julisa also feels feverish.   Symptom details     Nasal secretions: The color of her mucus is yellow.    Sore throat: Julisa reports having mild throat pain (1-3 on a 10 point pain scale), does not have exudate on her tonsils, and can swallow liquids. She is not sure if the lymph nodes in her neck are enlarged. A rash has not appeared on the skin since the sore throat started.     Temperature: Her current temperature is 99.6 degrees Fahrenheit.     Facial pain or pressure: The facial pain or pressure does not feel worse when bending or leaning forward.     Headache: She states the headache is mild (1-3 on a 10 point pain scale).      Julisa denies having ear pain, nausea, teeth pain, ageusia, diarrhea, cough, vomiting, rhinitis, myalgias, anosmia, and wheezing. She also denies having a sinus infection within the past year, taking antibiotic medication in the past month, and having recent facial or sinus surgery in the past 60 days. She is not experiencing dyspnea.   Precipitating events  Within the past week, Julisa has not been exposed to someone with strep throat. She has not recently been exposed to someone with influenza. Julisa has not been in close contact with any high risk individuals.   Pertinent COVID-19 (Coronavirus) " information  In the past 14 days, Julisa has not worked in a congregate living setting.   She either works or volunteers as a healthcare worker or a , or works or volunteers in a healthcare facility. She does not provide direct patient care. Additional job details as reported by the patient (free text): , Sadiq Egan also has not lived in a congregate living setting in the past 14 days. She lives with a healthcare worker.   Julisa has not had a close contact with a laboratory-confirmed COVID-19 patient within 14 days of symptom onset.   Since December 2019, Julisa and has not had upper respiratory infection or influenza-like illness. Has not been diagnosed with lab-confirmed COVID-19 test   Pertinent medical history  Julisa typically gets a yeast infection when she takes antibiotics. She has used fluconazole (Diflucan) to treat previous yeast infections. 1 dose of fluconazole (Diflucan) has typically been sufficient for symptoms to resolve in the past.   Julisa does not need a return to work/school note.   Weight: 130 lbs   Julisa does not smoke or use smokeless tobacco.   She denies pregnancy and denies breastfeeding. She is currently menstruating.   Weight: 130 lbs    MEDICATIONS: levonorgestrel oral, spironolactone oral, Flonase Allergy Relief nasal, Zyrtec oral, Lexapro oral, Abilify oral, ALLERGIES: NKDA  Clinician Response:  Dear Julisa,   Your symptoms show that you may have coronavirus (COVID-19). This illness can cause fever, cough and trouble breathing. Many people get a mild case and get better on their own. Some people can get very sick.  What should I do?  We would like to test you for this virus.   1. Please call [phone] to schedule your visit. Explain that you were referred by [facility] to have a COVID-19 test. Be ready to share your [facility] visit ID number.  The following will serve as your written order for this COVID Test, ordered by me, for  "the indication of suspected COVID [Z20.828]: The test will be ordered in [employer], our electronic health record, after you are scheduled. It will show as ordered and authorized by Ney Dobson MD.  Order: COVID-19 (Coronavirus) [facility]      2. When it's time for your COVID test:  Stay at least 6 feet away from others. (If someone will drive you to your test, stay in the backseat, as far away from the  as you can.)   Cover your mouth and nose with a mask, tissue or washcloth.  Go straight to the testing site. Don't make any stops on the way there or back.      3.Starting now: Stay home and away from others (self-isolate) until:   You've had no fever---and no medicine that reduces fever---for one full day (24 hours). And...   Your other symptoms have gotten better. For example, your cough or breathing has improved. And...   At least 10 days have passed since your symptoms started.       During this time, don't leave the house except for testing or medical care.   Stay in your own room, even for meals. Use your own bathroom if you can.   Stay away from others in your home. No hugging, kissing or shaking hands. No visitors.  Don't go to work, school or anywhere else.    Clean \"high touch\" surfaces often (doorknobs, counters, handles, etc.). Use a household cleaning spray or wipes. You'll find a full list of  on the EPA website: www.epa.gov/pesticide-registration/list-n-disinfectants-use-against-sars-cov-2.   Cover your mouth and nose with a mask, tissue or washcloth to avoid spreading germs.  Wash your hands and face often. Use soap and water.  Caregivers in these groups are at risk for severe illness due to COVID-19:  o People 65 years and older  o People who live in a nursing home or long-term care facility  o People with chronic disease (lung, heart, cancer, diabetes, kidney, liver, immunologic)  o People who have a weakened immune system, including those who:   Are in cancer " treatment  Take medicine that weakens the immune system, such as corticosteroids  Had a bone marrow or organ transplant  Have an immune deficiency  Have poorly controlled HIV or AIDS  Are obese (body mass index of 40 or higher)  Smoke regularly   o Caregivers should wear gloves while washing dishes, handling laundry and cleaning bedrooms and bathrooms.  o Use caution when washing and drying laundry: Don't shake dirty laundry, and use the warmest water setting that you can.  o For more tips, go to www.cdc.gov/coronavirus/2019-ncov/downloads/10Things.pdf.    4.Sign up for [employer]. We know it's scary to hear that you might have COVID-19. We want to track your symptoms to make sure you're okay over the next 2 weeks. Please look for an email from [employer]---this is a free, online program that we'll use to keep in touch. To sign up, follow the link in the email. Learn more at [email or web address]  How can I take care of myself?   Get lots of rest. Drink extra fluids (unless a doctor has told you not to).   Take Tylenol (acetaminophen) for fever or pain. If you have liver or kidney problems, ask your family doctor if it's okay to take Tylenol.   Adults can take either:    650 mg (two 325 mg pills) every 4 to 6 hours, or...   1,000 mg (two 500 mg pills) every 8 hours as needed.    Note: Don't take more than 3,000 mg in one day. Acetaminophen is found in many medicines (both prescribed and over-the-counter medicines). Read all labels to be sure you don't take too much.   For children, check the Tylenol bottle for the right dose. The dose is based on the child's age or weight.    If you have other health problems (like cancer, heart failure, an organ transplant or severe kidney disease): Call your specialty clinic if you don't feel better in the next 2 days.       Know when to call 911. Emergency warning signs include:    Trouble breathing or shortness of breath Pain or pressure in the chest that doesn't  go away Feeling confused like you haven't felt before, or not being able to wake up Bluish-colored lips or face.  Where can I get more information?   [facility] -- About COVID-19: [email or web address]   CDC -- What to Do If You're Sick: www.cdc.gov/coronavirus/2019-ncov/about/steps-when-sick.html   CDC -- Ending Home Isolation: www.cdc.gov/coronavirus/2019-ncov/hcp/disposition-in-home-patients.html   [facility] -- Caring for Someone: www.cdc.gov/coronavirus/2019-ncov/if-you-are-sick/care-for-someone.html   [facility] -- Interim Guidance for Hospital Discharge to Home: www.[facility] hospital.[place]/diseases/coronavirus/hcp/hospdischarge.pdf   [facility] clinical trials (COVID-19 research studies): clinicalaffairs.[facility]-clinical-trials    Below are the COVID-19 hotlines at the [facility] ([facility]). Interpreters are available.    For health questions: Call 835-840-1588 or 1-145.537.9670 (7 a.m. to 7 p.m.) For questions about schools and childcare: Call 405-682-8953 or 1-122.508.5295 (7 a.m. to 7 p.m.)    Diagnosis: Pain in throat  Diagnosis ICD: R07.0

## 2020-08-17 ENCOUNTER — TELEPHONE (OUTPATIENT)
Dept: FAMILY MEDICINE | Facility: CLINIC | Age: 40
End: 2020-08-17

## 2020-08-17 LAB
SARS-COV-2 RNA SPEC QL NAA+PROBE: NOT DETECTED
SPECIMEN SOURCE: NORMAL

## 2020-08-17 NOTE — TELEPHONE ENCOUNTER
Reviewed symptoms noted below. Duration of 2-3 days does not meet criteria for bacterial sinusitis and likely would still be viral at this time. Thus, do not feel abx therapy is warranted at this time. Would recommend observation and supportive cares.  Electronically Signed By: Carmen Pham PA-C

## 2020-08-17 NOTE — TELEPHONE ENCOUNTER
See message below. COVID testing was negative. I spoke with patient:    Acute Illness   Acute illness concerns: right facial pain, sinus infection  Onset: 8/15/20 - see [facility] visit notes    Fever: YES- 99.4    Chills/Sweats: no    Headache (location?): YES- right side    Sinus Pressure:YES - right side    Conjunctivitis:  no    Ear Pain: no    Rhinorrhea: YES- yellow and clear, thick    Congestion: YES    Sore Throat: no - this resolved over the weekend     Cough: no    Wheeze: no    Decreased Appetite: no    Nausea: no    Vomiting: no    Diarrhea:  no    Dysuria/Freq.: no    Fatigue/Achiness: YES- a little tired    Sick/Strep Exposure: no     Therapies Tried and outcome: ibuprofen - helps temporarily.    Wondering if she can get antibiotics for possible sinus infection. Please review and advise. Thank you.  Feli Lan, BSN, RN  [facility]

## 2020-08-18 NOTE — TELEPHONE ENCOUNTER
Late entry. Attempted to call patient back yesterday evening (8/17/20) twice, but line rang one or twice and disconnected. Will need to attempt again at a later time.  FIDEL GomezN, RN  [facility]

## 2020-08-24 ENCOUNTER — OFFICE VISIT (OUTPATIENT)
Dept: FAMILY MEDICINE | Facility: CLINIC | Age: 40
End: 2020-08-24
Payer: COMMERCIAL

## 2020-08-24 VITALS
TEMPERATURE: 98.5 F | OXYGEN SATURATION: 98 % | DIASTOLIC BLOOD PRESSURE: 66 MMHG | WEIGHT: 139 LBS | BODY MASS INDEX: 21.77 KG/M2 | HEART RATE: 77 BPM | SYSTOLIC BLOOD PRESSURE: 100 MMHG

## 2020-08-24 DIAGNOSIS — B37.31 CANDIDIASIS OF VAGINA: Primary | ICD-10-CM

## 2020-08-24 DIAGNOSIS — N89.8 VAGINAL ITCHING: ICD-10-CM

## 2020-08-24 DIAGNOSIS — Z11.3 ENCOUNTER FOR SCREENING EXAMINATION FOR SEXUALLY TRANSMITTED DISEASE: ICD-10-CM

## 2020-08-24 DIAGNOSIS — F33.42 RECURRENT MAJOR DEPRESSIVE DISORDER, IN FULL REMISSION (H): ICD-10-CM

## 2020-08-24 LAB
SPECIMEN SOURCE: ABNORMAL
WET PREP SPEC: ABNORMAL

## 2020-08-24 PROCEDURE — 87591 N.GONORRHOEAE DNA AMP PROB: CPT | Performed by: INTERNAL MEDICINE

## 2020-08-24 PROCEDURE — 87491 CHLMYD TRACH DNA AMP PROBE: CPT | Performed by: INTERNAL MEDICINE

## 2020-08-24 PROCEDURE — 87210 SMEAR WET MOUNT SALINE/INK: CPT | Performed by: INTERNAL MEDICINE

## 2020-08-24 PROCEDURE — 99214 OFFICE O/P EST MOD 30 MIN: CPT | Performed by: INTERNAL MEDICINE

## 2020-08-24 RX ORDER — FLUCONAZOLE 150 MG/1
150 TABLET ORAL ONCE
Qty: 1 TABLET | Refills: 0 | Status: SHIPPED | OUTPATIENT
Start: 2020-08-24 | End: 2020-08-24

## 2020-08-24 ASSESSMENT — PATIENT HEALTH QUESTIONNAIRE - PHQ9: SUM OF ALL RESPONSES TO PHQ QUESTIONS 1-9: 0

## 2020-08-24 NOTE — PROGRESS NOTES
Subjective     Julisa Tillman is a 40 year old female who presents to clinic today for the following health issues:    HPI       Vaginal Symptoms  Onset/Duration: 1 week  Description:  Vaginal Discharge: none   Itching (Pruritis): YES  Burning sensation:  no  Odor: no  Accompanying Signs & Symptoms:  Urinary symptoms: no  Abdominal pain: no  Fever: no  History:   Sexually active: YES  New Partner: YES  Possibility of Pregnancy:  no  Recent antibiotic use: no  Previous vaginitis issues: no  Precipitating or alleviating factors: None  Therapies tried and outcome: none       No Known Allergies     Past Medical History:   Diagnosis Date     Cervical high risk HPV (human papillomavirus) test positive 2018 NIL, +HPV 16     Major depression, recurrent (H) 2016     Smoker 10/13/2016       Past Surgical History:   Procedure Laterality Date     ENT SURGERY         History reviewed. No pertinent family history.    Social History     Tobacco Use     Smoking status: Former Smoker     Packs/day: 0.25     Years: 20.00     Pack years: 5.00     Last attempt to quit: 2018     Years since quittin.2     Smokeless tobacco: Never Used   Substance Use Topics     Alcohol use: No        Current Outpatient Medications   Medication     ARIPiprazole (ABILIFY) 2 MG tablet     escitalopram (LEXAPRO) 20 MG tablet     fluticasone (FLONASE) 50 MCG/ACT spray     levonorgestrel-ethinyl estradiol (AVIANE) 0.1-20 MG-MCG tablet     traZODone (DESYREL) 50 MG tablet     propranolol (INDERAL) 20 MG tablet     spironolactone (ALDACTONE) 100 MG tablet     No current facility-administered medications for this visit.         Review of Systems   Constitutional, HEENT, cardiovascular, pulmonary, GI, , musculoskeletal, neuro, skin, endocrine and psych systems are negative, except as otherwise noted.      Objective    /66 (Cuff Size: Adult Regular)   Pulse 77   Temp 98.5  F (36.9  C) (Tympanic)   Wt 63 kg (139 lb)    SpO2 98%   BMI 21.77 kg/m    Body mass index is 21.77 kg/m .  Physical Exam   GENERAL: healthy, alert and no distress  EYES: Eyes grossly normal to inspection, PERRL and conjunctivae and sclerae normal  HENT: ear canals and TM's normal, nose and mouth without ulcers or lesions  NECK: no adenopathy, no asymmetry, masses, or scars and thyroid normal to palpation  RESP: lungs clear to auscultation - no rales, rhonchi or wheezes  CV: regular rate and rhythm, normal S1 S2, no S3 or S4, no murmur, click or rub, no peripheral edema and peripheral pulses strong  ABDOMEN: soft, nontender, no hepatosplenomegaly, no masses and bowel sounds normal  PELVIC EXAM : Defered.  MS: no gross musculoskeletal defects noted, no edema  SKIN: no suspicious lesions or rashes  NEURO: Normal strength and tone, mentation intact and speech normal  PSYCH: mentation appears normal, affect normal/bright    Labs and imaging reviewed and discussed with the patient.        Assessment and Plan      1. Candidiasis of vagina  2. Vaginal itching  3. Encounter for screening examination for sexually transmitted disease  New problem, pt not opting any blood work offered, all the risks understood. Opting only wet prep and Chlamydia gonorrhea.   Update - Positive for Candida at this time but negative for BV or trichomonas.   - Wet prep  - Chlamydia trachomatis PCR  - Neisseria gonorrhoeae PCR  - fluconazole (DIFLUCAN) 150 MG tablet; Take 1 tablet (150 mg) by mouth once for 1 dose  Dispense: 1 tablet; Refill: 0    3. Recurrent major depressive disorder, in full remission (H)  Well controlled, PHQ-9 in the office today normal, follow up with psychotherapy and medications as listed in medication list.         Patient Instructions     Please do the lab work ordered. Will await for the lab results before sending medications to your pharmacy. We will call and let you know.    =======================    Patient Education     What Are Sexually Transmitted Diseases  (STDs)?  A sexually transmitted disease (STD) is a disease that is spread during sex. (An STD can also be called STI for sexually transmitted infection.) You can become infected with an STD if you have sex with someone who has an STD. Any sex that involves the penis, vagina, anus, or mouth can spread disease. Some STDs spread through body fluids such as semen, vaginal fluid, or blood. Others spread through contact with affected skin.  Who is at risk?     Places on or in the body where STDs cause damage include reproductive organs, the rectum, and the mouth.   It doesn t matter if you re straight or staples, male or female, young or old. Any person who has sex can get an STD. Your risk increases if:    You have more than one partner. The more partners you have, the greater your risk.    Your partner has other partners. If your partner is exposed to an STD, you could be, too.    You or your partner have had sex with other people in the past. Either of you might be carrying an STD from an earlier partner.    You have an STD. The STD may cause sores or other health problems that increase your risk of new infections. Your risk will stay high unless you change the behaviors that put you at risk of the current infection.  Prevent future problems  Left untreated, certain STDs can lead to cancer or even death. Some can harm unborn babies whose mothers are infected. Others can cause you to not be able to have children (sterility) or can affect changes in behavior or your ability to think. You can prevent these problems with safer sex, regular checkups, and early treatment. Always use a latex condom when you have sex. Get tested if you re at risk. And get treated early if you have an STD.  Getting checked  The only sure way to know if you have an STD is to get checked by a healthcare provider. If you notice a change in how your body looks or feels, have it checked out. But keep in mind, STDs don t always show symptoms. So if  you re at risk of STDs, get checked regularly. If you find you have an STD, be sure your partner gets treatment, too. If not, his or her health is at risk. And left untreated, your partner could pass the STD back to you, or on to others.  Common symptoms  Be alert to any changes in your body and your partner s body. Symptoms may appear in or near the vagina, penis, rectum, mouth, or throat. They include:    Unusual discharge    Lumps, bumps, or rashes    Sores that may be painful, itchy, or painless    Itchy skin    Burning with urination    Pain in the pelvis, belly (abdomen), or rectum  Even if you don t have symptoms  You may have an STD, even if you don t have symptoms. If you think you are at risk, get checked. Go to a clinic or to your healthcare provider. If your partner has an STD, you need to be tested too, even if you feel fine.  Vaccines to prevent disease  Vaccines (also called immunizations) are available to prevent hepatitis A and hepatitis B. These are two kinds of STDs. There is also a vaccine to prevent HPV. This is a virus that can be passed from person to person through sexual contact. Ask your healthcare provider whether any of these vaccines is right for you.   Date Last Reviewed: 11/1/2016 2000-2019 [employer]. [address] [place] [address], [place] [id number]. All rights reserved. This information is not intended as a substitute for professional medical care. Always follow your healthcare professional's instructions.               Return in about 4 weeks (around 9/21/2020), or if symptoms worsen or fail to improve, for Preventative Visit.    Lilly Rock MD  [facility]

## 2020-08-24 NOTE — PATIENT INSTRUCTIONS
Please do the lab work ordered. Will await for the lab results before sending medications to your pharmacy. We will call and let you know.    =======================    Patient Education     What Are Sexually Transmitted Diseases (STDs)?  A sexually transmitted disease (STD) is a disease that is spread during sex. (An STD can also be called STI for sexually transmitted infection.) You can become infected with an STD if you have sex with someone who has an STD. Any sex that involves the penis, vagina, anus, or mouth can spread disease. Some STDs spread through body fluids such as semen, vaginal fluid, or blood. Others spread through contact with affected skin.  Who is at risk?     Places on or in the body where STDs cause damage include reproductive organs, the rectum, and the mouth.   It doesn t matter if you re straight or staples, male or female, young or old. Any person who has sex can get an STD. Your risk increases if:    You have more than one partner. The more partners you have, the greater your risk.    Your partner has other partners. If your partner is exposed to an STD, you could be, too.    You or your partner have had sex with other people in the past. Either of you might be carrying an STD from an earlier partner.    You have an STD. The STD may cause sores or other health problems that increase your risk of new infections. Your risk will stay high unless you change the behaviors that put you at risk of the current infection.  Prevent future problems  Left untreated, certain STDs can lead to cancer or even death. Some can harm unborn babies whose mothers are infected. Others can cause you to not be able to have children (sterility) or can affect changes in behavior or your ability to think. You can prevent these problems with safer sex, regular checkups, and early treatment. Always use a latex condom when you have sex. Get tested if you re at risk. And get treated early if you have an STD.  Getting  checked  The only sure way to know if you have an STD is to get checked by a healthcare provider. If you notice a change in how your body looks or feels, have it checked out. But keep in mind, STDs don t always show symptoms. So if you re at risk of STDs, get checked regularly. If you find you have an STD, be sure your partner gets treatment, too. If not, his or her health is at risk. And left untreated, your partner could pass the STD back to you, or on to others.  Common symptoms  Be alert to any changes in your body and your partner s body. Symptoms may appear in or near the vagina, penis, rectum, mouth, or throat. They include:    Unusual discharge    Lumps, bumps, or rashes    Sores that may be painful, itchy, or painless    Itchy skin    Burning with urination    Pain in the pelvis, belly (abdomen), or rectum  Even if you don t have symptoms  You may have an STD, even if you don t have symptoms. If you think you are at risk, get checked. Go to a clinic or to your healthcare provider. If your partner has an STD, you need to be tested too, even if you feel fine.  Vaccines to prevent disease  Vaccines (also called immunizations) are available to prevent hepatitis A and hepatitis B. These are two kinds of STDs. There is also a vaccine to prevent HPV. This is a virus that can be passed from person to person through sexual contact. Ask your healthcare provider whether any of these vaccines is right for you.   Date Last Reviewed: 11/1/2016 2000-2019 [employer]. [address] [place] [address], [place] [address]. All rights reserved. This information is not intended as a substitute for professional medical care. Always follow your healthcare professional's instructions.

## 2020-08-24 NOTE — LETTER
August 27, 2020      Julisa Tillman  [address]  [place] [address]        Dear ,    We are writing to inform you of your test results.    As already spoke to you on phone, your lab reults are positive for yeast infection. Hope you have used the medciation sent already to your pharmacy.     All your other labs normal, you may see some highlighted which do not have Clinical significance.     Resulted Orders   Wet prep   Result Value Ref Range    Specimen Description Vagina     Wet Prep No Trichomonas seen     Wet Prep No clue cells seen     Wet Prep Few  Yeast seen   (A)     Wet Prep Few  WBC'S seen      Chlamydia trachomatis PCR   Result Value Ref Range    Specimen Description Urine     Chlamydia Trachomatis PCR Negative NEG^Negative      Comment:      Negative for C. trachomatis rRNA by transcription mediated amplification.  A negative result by transcription mediated amplification does not preclude   the presence of C. trachomatis infection because results are dependent on   proper and adequate collection, absence of inhibitors, and sufficient rRNA to   be detected.     Neisseria gonorrhoeae PCR   Result Value Ref Range    Specimen Descrip Urine     N Gonorrhea PCR Negative NEG^Negative      Comment:      Negative for N. gonorrhoeae rRNA by transcription mediated amplification.  A negative result by transcription mediated amplification does not preclude   the presence of N. gonorrhoeae infection because results are dependent on   proper and adequate collection, absence of inhibitors, and sufficient rRNA to   be detected.         If you have any questions or concerns, please call the clinic at the number listed above.       Sincerely,        Lilly Rock MD

## 2020-08-26 ENCOUNTER — TELEPHONE (OUTPATIENT)
Dept: FAMILY MEDICINE | Facility: CLINIC | Age: 40
End: 2020-08-26

## 2020-08-26 NOTE — TELEPHONE ENCOUNTER
Per chart review, lab results are in process.   Huddled with  Phyllis. Results may not be available until 08/27.     Pt was called with information above. Pt is expecting a call back with lab results are back. Postponing message. Please send message to provider for review when lab results is back.

## 2020-08-27 NOTE — TELEPHONE ENCOUNTER
Pt was called with lab results. Notes yeast symptoms are alot better. She was informed lab letter was also sent. She verbalized agreement with plan.

## 2020-09-14 ENCOUNTER — OFFICE VISIT (OUTPATIENT)
Dept: OBGYN | Facility: CLINIC | Age: 40
End: 2020-09-14
Payer: COMMERCIAL

## 2020-09-14 VITALS — BODY MASS INDEX: 21.61 KG/M2 | WEIGHT: 138 LBS | DIASTOLIC BLOOD PRESSURE: 70 MMHG | SYSTOLIC BLOOD PRESSURE: 110 MMHG

## 2020-09-14 VITALS
DIASTOLIC BLOOD PRESSURE: 70 MMHG | WEIGHT: 138 LBS | SYSTOLIC BLOOD PRESSURE: 110 MMHG | BODY MASS INDEX: 21.66 KG/M2 | HEIGHT: 67 IN

## 2020-09-14 DIAGNOSIS — Z86.19 HISTORY OF HPV INFECTION: ICD-10-CM

## 2020-09-14 DIAGNOSIS — Z30.09 BIRTH CONTROL COUNSELING: Primary | ICD-10-CM

## 2020-09-14 DIAGNOSIS — D06.9 CIN III WITH SEVERE DYSPLASIA: Primary | ICD-10-CM

## 2020-09-14 DIAGNOSIS — Z30.09 CONSULTATION FOR FEMALE STERILIZATION: ICD-10-CM

## 2020-09-14 DIAGNOSIS — B97.7 HIGH RISK HPV INFECTION: ICD-10-CM

## 2020-09-14 PROCEDURE — 99213 OFFICE O/P EST LOW 20 MIN: CPT | Mod: 25 | Performed by: ADVANCED PRACTICE MIDWIFE

## 2020-09-14 PROCEDURE — G0123 SCREEN CERV/VAG THIN LAYER: HCPCS | Performed by: ADVANCED PRACTICE MIDWIFE

## 2020-09-14 PROCEDURE — G0124 SCREEN C/V THIN LAYER BY MD: HCPCS | Performed by: ADVANCED PRACTICE MIDWIFE

## 2020-09-14 PROCEDURE — 99214 OFFICE O/P EST MOD 30 MIN: CPT | Performed by: OBSTETRICS & GYNECOLOGY

## 2020-09-14 PROCEDURE — 87624 HPV HI-RISK TYP POOLED RSLT: CPT | Performed by: ADVANCED PRACTICE MIDWIFE

## 2020-09-14 ASSESSMENT — MIFFLIN-ST. JEOR: SCORE: 1318.59

## 2020-09-14 NOTE — PROGRESS NOTES
SUBJECTIVE:   Julisa Tillman is a 40 year old who presents to the clinic for discussion of birth control methods. She is interested in discussing long term methods, most likely tubal ligation.   She has used the following methods in the past: SHWETA  Today she is interested in discussing Tubal ligation/Essure, Mirena   Histories reviewed and updated  Past Medical History:   Diagnosis Date     Cervical high risk HPV (human papillomavirus) test positive 2018 NIL, +HPV 16     Major depression, recurrent (H) 2016     Smoker 10/13/2016     Past Surgical History:   Procedure Laterality Date     ENT SURGERY       Social History     Socioeconomic History     Marital status: Legally      Spouse name: Not on file     Number of children: Not on file     Years of education: Not on file     Highest education level: Not on file   Occupational History     Not on file   Social Needs     Financial resource strain: Not on file     Food insecurity     Worry: Not on file     Inability: Not on file     Transportation needs     Medical: Not on file     Non-medical: Not on file   Tobacco Use     Smoking status: Former Smoker     Packs/day: 0.25     Years: 20.00     Pack years: 5.00     Last attempt to quit: 2018     Years since quittin.2     Smokeless tobacco: Never Used   Substance and Sexual Activity     Alcohol use: No     Drug use: No     Sexual activity: Yes     Partners: Male     Birth control/protection: Male Surgical   Lifestyle     Physical activity     Days per week: Not on file     Minutes per session: Not on file     Stress: Not on file   Relationships     Social connections     Talks on phone: Not on file     Gets together: Not on file     Attends Baptism service: Not on file     Active member of club or organization: Not on file     Attends meetings of clubs or organizations: Not on file     Relationship status: Not on file     Intimate partner violence     Fear of current or ex  partner: Not on file     Emotionally abused: Not on file     Physically abused: Not on file     Forced sexual activity: Not on file   Other Topics Concern     Parent/sibling w/ CABG, MI or angioplasty before 65F 55M? Not Asked   Social History Narrative     Not on file     History reviewed. No pertinent family history.    Menstrual History:  Menses every 28 days.  Length of menses: 5 days  Menstrual description: normal    Denies the following contraindications to estrogen/progesterone combined contraception:  Migraine with aura  Smoking over age 35  Liver disease  Personal history of blood clot or stroke   History of heart disease  History of breast cancer  Undiagnosed vaginal bleeding  Hypertension  Pregnancy    Health maintenance updated:  yes        EXAM:  /70 (BP Location: Left arm, Cuff Size: Adult Regular)   Wt 62.6 kg (138 lb)   LMP 09/12/2020 (Exact Date)   Breastfeeding No   BMI 21.61 kg/m    Body mass index is 21.61 kg/m .  Exam:  Constitutional: healthy, alert and no distress  Respiratory: negative, Percussion normal. Good diaphragmatic excursion.   Psychiatric: mentation appears normal and affect normal/bright  PELVIC: No lesions present, pt currently menstruating  CERVIX: no lesions, moderate amount of blood in vaginal vault     ASSESSMENT/PLAN:    ICD-10-CM    1. Birth control counseling  Z30.09    2. History of HPV infection  Z86.19 Pap imaged thin layer screen with HPV - recommended age 30 - 65 years (select HPV order below)       COUNSELING:  Reviewed risks and benefits of contraceptive use Mirena IUD vs tubal ligation. She is more interested in tubal, would like OBGYN consult to discuss further.   Pap done today as pt was due. Discussed that due to menses it may be necessary to repeat pap.     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 Pt has appointment  with GYN for this afternoon for tubal consult, this provider has been alerted to hx of ROWENA-3 and will discuss need for LEEP, further testing at that time.    MADAI Barraza CNM 9/14/2020 10:41 AM

## 2020-09-14 NOTE — NURSING NOTE
"Chief Complaint   Patient presents with     Consult     Consult for TL. Desires permanent birth control.        Initial /70   Ht 1.702 m (5' 7\")   Wt 62.6 kg (138 lb)   LMP 2020 (Exact Date)   Breastfeeding No   BMI 21.61 kg/m   Estimated body mass index is 21.61 kg/m  as calculated from the following:    Height as of this encounter: 1.702 m (5' 7\").    Weight as of this encounter: 62.6 kg (138 lb).  BP completed using cuff size: regular    Questioned patient about current smoking habits.  Pt. quit smoking some time ago.          The following HM Due: NONE      The following patient reported/Care Every where data was sent to:  P ABSTRACT QUALITY INITIATIVES [[id number]]  Coretta Stanley LPN               "

## 2020-09-14 NOTE — NURSING NOTE
"Chief Complaint   Patient presents with     Contraception       Initial /70 (BP Location: Left arm, Cuff Size: Adult Regular)   Wt 62.6 kg (138 lb)   LMP 2020 (Exact Date)   Breastfeeding No   BMI 21.61 kg/m   Estimated body mass index is 21.61 kg/m  as calculated from the following:    Height as of 20: 1.702 m (5' 7\").    Weight as of this encounter: 62.6 kg (138 lb).  BP completed using cuff size: regular    Questioned patient about current smoking habits.  Pt. has never smoked.          Bjorn Watkins MA               "

## 2020-09-14 NOTE — PROGRESS NOTES
HPI:  Julisa Tillman is a 40 year old white female  Patient's last menstrual period was 2020 (exact date).  OCPs for contraception, who presents for evaluation of request for permanent sterilization.  The patient states that she does not want to be on birth control pill anymore and definitely does not want any more children.  I reviewed her past history and on 2019 the patient saw Dr. Serrano for evaluation of an HPV screen positive for HPV 16 in May 2018 with a normal Pap.  Colposcopic examination revealed the ECC to be positive for ROWENA-2 to ROWENA-3.  It was recommended to the patient that she have a LEEP procedure done the patient does not have that done.  I had a lengthy discussion with the patient today regarding the pathophysiology of cervical atypia, HPV 16 and ROWENA-3.  We discussed the risks benefits and alternative forms of therapy and I recommend the patient have a LEEP procedure done.  Patient states that she will schedule this..    The risks benefits and alt to perm sterilization including the <100% effectiveness were thoroughly discussed.  I think she has a good understanding and all her quesitons have been answered.  We discussed laparoscopic bilateral salpingectomy and the rationale of this compared to tubal fulguration.  I gave the patient a detailed written outline and he shows a good understanding  .    Past Medical History:   Diagnosis Date     Cervical high risk HPV (human papillomavirus) test positive 2018 NIL, +HPV 16     Major depression, recurrent (H) 2016     Smoker 10/13/2016     Past Surgical History:   Procedure Laterality Date     ENT SURGERY       History reviewed. No pertinent family history.  Social History     Socioeconomic History     Marital status: Legally      Spouse name: Not on file     Number of children: Not on file     Years of education: Not on file     Highest education level: Not on file   Occupational History     Not on file    Social Needs     Financial resource strain: Not on file     Food insecurity     Worry: Not on file     Inability: Not on file     Transportation needs     Medical: Not on file     Non-medical: Not on file   Tobacco Use     Smoking status: Former Smoker     Packs/day: 0.25     Years: 20.00     Pack years: 5.00     Last attempt to quit: 2018     Years since quittin.2     Smokeless tobacco: Never Used   Substance and Sexual Activity     Alcohol use: No     Drug use: No     Sexual activity: Yes     Partners: Male     Birth control/protection: Male Surgical   Lifestyle     Physical activity     Days per week: Not on file     Minutes per session: Not on file     Stress: Not on file   Relationships     Social connections     Talks on phone: Not on file     Gets together: Not on file     Attends Islam service: Not on file     Active member of club or organization: Not on file     Attends meetings of clubs or organizations: Not on file     Relationship status: Not on file     Intimate partner violence     Fear of current or ex partner: Not on file     Emotionally abused: Not on file     Physically abused: Not on file     Forced sexual activity: Not on file   Other Topics Concern     Parent/sibling w/ CABG, MI or angioplasty before 65F 55M? Not Asked   Social History Narrative     Not on file       Allergies:  Patient has no known allergies.    Current Outpatient Medications   Medication Sig Dispense Refill     ARIPiprazole (ABILIFY) 2 MG tablet TAKE 1 TABLET(2 MG) BY MOUTH DAILY 30 tablet 0     escitalopram (LEXAPRO) 20 MG tablet Take 1 tablet (20 mg) by mouth daily 90 tablet 0     fluticasone (FLONASE) 50 MCG/ACT spray Spray 1 spray into both nostrils daily       spironolactone (ALDACTONE) 100 MG tablet        traZODone (DESYREL) 50 MG tablet Take 1 tablet (50 mg) by mouth nightly as needed for sleep 30 tablet 2     levonorgestrel-ethinyl estradiol (AVIANE) 0.1-20 MG-MCG tablet Take 1 tablet by mouth daily 84  "tablet 3     propranolol (INDERAL) 20 MG tablet Take 1 tablet (20 mg) by mouth 3 times daily as needed (anxiety) (Patient not taking: Reported on 9/14/2020) 90 tablet 2       Review Of Systems   ROS: 10 point ROS neg other than the symptoms noted above in the HPI.    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 Pathophysiology of the disease process reviewed, all of the patients questions answered and informed consent obtained.    Plan: Recommend a LEEP procedure done.  Pre-and post procedure cares were discussed and need for close follow-up would lessen her percent efficacy also reviewed patient will schedule this for September 29, 2020.    (B97.7) High risk HPV infection  Comment: As above  Plan: We will institute post LEEP surveillance after the procedure as per protocol.  Written plan given    (Z30.09) Consultation for female sterilization  Comment: I think this patient is given this decision a tremendous amount of clot and she is a good candidate for this procedure.  Plan: We will proceed with laparoscopic bilateral salpingectomy after the LEEP procedure is done.  Written plan given  25 minutes spent greater than 50% counseling      Tyler Baer M.D.           "

## 2020-09-16 PROBLEM — Z30.09 CONSULTATION FOR FEMALE STERILIZATION: Status: ACTIVE | Noted: 2020-08-24

## 2020-09-16 PROBLEM — B97.7 HIGH RISK HPV INFECTION: Status: ACTIVE | Noted: 2020-09-16

## 2020-09-16 LAB
COPATH REPORT: ABNORMAL
PAP: ABNORMAL

## 2020-09-18 LAB
FINAL DIAGNOSIS: ABNORMAL
HPV HR 12 DNA CVX QL NAA+PROBE: POSITIVE
HPV16 DNA SPEC QL NAA+PROBE: POSITIVE
HPV18 DNA SPEC QL NAA+PROBE: NEGATIVE
SPECIMEN DESCRIPTION: ABNORMAL
SPECIMEN SOURCE CVX/VAG CYTO: ABNORMAL

## 2020-09-22 ENCOUNTER — PATIENT OUTREACH (OUTPATIENT)
Dept: OBGYN | Facility: CLINIC | Age: 40
End: 2020-09-22

## 2020-09-29 ENCOUNTER — OFFICE VISIT (OUTPATIENT)
Dept: OBGYN | Facility: CLINIC | Age: 40
End: 2020-09-29
Payer: COMMERCIAL

## 2020-09-29 VITALS — BODY MASS INDEX: 21.46 KG/M2 | DIASTOLIC BLOOD PRESSURE: 74 MMHG | WEIGHT: 137 LBS | SYSTOLIC BLOOD PRESSURE: 112 MMHG

## 2020-09-29 DIAGNOSIS — D06.9 CIN III WITH SEVERE DYSPLASIA: Primary | ICD-10-CM

## 2020-09-29 LAB — HCG, QUAL URINE: NORMAL

## 2020-09-29 PROCEDURE — 88305 TISSUE EXAM BY PATHOLOGIST: CPT | Performed by: OBSTETRICS & GYNECOLOGY

## 2020-09-29 PROCEDURE — 84703 CHORIONIC GONADOTROPIN ASSAY: CPT | Performed by: OBSTETRICS & GYNECOLOGY

## 2020-09-29 PROCEDURE — 57460 BX OF CERVIX W/SCOPE LEEP: CPT | Performed by: OBSTETRICS & GYNECOLOGY

## 2020-09-29 PROCEDURE — 88307 TISSUE EXAM BY PATHOLOGIST: CPT | Performed by: OBSTETRICS & GYNECOLOGY

## 2020-09-29 NOTE — PATIENT INSTRUCTIONS
You can reach your [facility] Care Team any time of the day by calling [phone]. This number will put you in touch with the 24 hour nurse line if the clinic is closed.    To contact your OB/GYN Station Coordinator/Surgery Scheduler please call [phone]. This is a direct number for your care team between 8 a.m. and 4 p.m. Monday through Friday.    [facility] is open for your convenience:  Monday through Friday 8 a.m. to 6 p.m.  Closed weekends and all major holidays.

## 2020-09-29 NOTE — NURSING NOTE
"Chief Complaint   Patient presents with     Leep       Initial /74   Wt 62.1 kg (137 lb)   LMP 2020 (Exact Date)   BMI 21.46 kg/m   Estimated body mass index is 21.46 kg/m  as calculated from the following:    Height as of 20: 1.702 m (5' 7\").    Weight as of this encounter: 62.1 kg (137 lb).  BP completed using cuff size: regular    Questioned patient about current smoking habits.  Pt. has never smoked.          The following HM Due: NONE      The following patient reported/Care Every where data was sent to:  P ABSTRACT QUALITY INITIATIVES [[id number]]        Kaylee Jensen [facility]                 "

## 2020-10-01 LAB — COPATH REPORT: NORMAL

## 2020-10-01 NOTE — PROGRESS NOTES
Julisa Tillman is a 40 year old white female  Patient's last menstrual period was 2020 (exact date).  OCPs for contraception, who presents for evaluation of request for permanent sterilization.  The patient states that she does not want to be on birth control pill anymore and definitely does not want any more children.  I reviewed her past history and on 2019 the patient saw Dr. Serrano for evaluation of an HPV screen positive for HPV 16 in May 2018 with a normal Pap.  Colposcopic examination revealed the ECC to be positive for ROWENA-2 to ROWENA-3.  It was recommended to the patient that she have a LEEP procedure done the patient does not have that done.  I had a lengthy discussion with the patient today regarding the pathophysiology of cervical atypia, HPV 16 and ROWENA-3.  We discussed the risks benefits and alternative forms of therapy and I recommend the patient have a LEEP procedure done.  She presents today to have this done.    Past Medical History:   Diagnosis Date     Cervical high risk HPV (human papillomavirus) test positive 2018 NIL, +HPV 16     Major depression, recurrent (H) 2016     Smoker 10/13/2016     Current Outpatient Medications   Medication     ARIPiprazole (ABILIFY) 2 MG tablet     escitalopram (LEXAPRO) 20 MG tablet     fluticasone (FLONASE) 50 MCG/ACT spray     levonorgestrel-ethinyl estradiol (AVIANE) 0.1-20 MG-MCG tablet     spironolactone (ALDACTONE) 100 MG tablet     traZODone (DESYREL) 50 MG tablet     propranolol (INDERAL) 20 MG tablet     No current facility-administered medications for this visit.      /74   Wt 62.1 kg (137 lb)   LMP 2020 (Exact Date)   BMI 21.46 kg/m    Constitutional: healthy, alert and no distress  Genitourinary: Normal external genitalia without lesions and after obtaining iformed consent colposcopy was preformed confirming the previous findings.  the pt was then prepped and the cervix infiltrated with approximately  20 cc's of 1% lidocaine with epinephrine.  The LEEP conization was then preformed with a medium sized loop in such a manner to outline all abnormal areas.  The endocervical canal was examined showing normal columnar epithelium and an ECC was preformed.  the base of the LEEP was then treated with the ball cautery device and monsels solution.  Several mm's of additional nl ectocervix were treated as well.  At the completion of the procedure hemostasis was adequate and counts correct.  the pt tolerated the procedure well without concerns.  Post op cares, sx's of complicatons, pelvic rest and activity limitations and the need for f/u were rev.    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  Her urine pregnancy test was negative today prior to doing the procedure  Plan: HCL HCG, URINE, NURSE BACKOFFICE, Surgical         pathology exam        We will see her back in 10 to 14 days and again in 6 weeks for follow-up.  She will call for any concerns in the interval

## 2020-10-05 ENCOUNTER — PATIENT OUTREACH (OUTPATIENT)
Dept: OBGYN | Facility: CLINIC | Age: 40
End: 2020-10-05

## 2020-10-05 DIAGNOSIS — D06.9 CIN III WITH SEVERE DYSPLASIA: ICD-10-CM

## 2020-10-05 NOTE — RESULT ENCOUNTER NOTE
I will review these results with the patient when I see her in the office on follow-up on October 12, 2020.  Please review them with the patient and if she has any questions I will be happy to call her

## 2020-10-05 NOTE — TELEPHONE ENCOUNTER
5/16/18 NIL, +HPV 16. Plan colp  12/14/18 Patient is lost to pap tracking follow-up.  7/16/19 [place] ECC: ROWENA 2-3. Plan LEEP - not completed  9/14/20 HSIL pap, + HR HPV 16 & other HR type. Plan LEEP  09/23/20 Pt notified - she has already planned for LEEP per direction of provider  9/29/20 LEEP Bx: ROWENA 1 & ROWENA 3, margins neg for high grade dysplasia, ECC: ROWENA 2. Plan pending provider   10/12/20 Follow up visit scheduled

## 2020-10-12 ENCOUNTER — OFFICE VISIT (OUTPATIENT)
Dept: OBGYN | Facility: CLINIC | Age: 40
End: 2020-10-12
Payer: COMMERCIAL

## 2020-10-12 VITALS — WEIGHT: 135 LBS | DIASTOLIC BLOOD PRESSURE: 70 MMHG | BODY MASS INDEX: 21.14 KG/M2 | SYSTOLIC BLOOD PRESSURE: 110 MMHG

## 2020-10-12 DIAGNOSIS — Z30.09 CONSULTATION FOR FEMALE STERILIZATION: ICD-10-CM

## 2020-10-12 DIAGNOSIS — D06.9 CIN III WITH SEVERE DYSPLASIA: Primary | ICD-10-CM

## 2020-10-12 DIAGNOSIS — N94.6 DYSMENORRHEA: ICD-10-CM

## 2020-10-12 PROCEDURE — 99213 OFFICE O/P EST LOW 20 MIN: CPT | Performed by: OBSTETRICS & GYNECOLOGY

## 2020-10-12 NOTE — NURSING NOTE
"Chief Complaint   Patient presents with     Follow Up     leep       Initial /70   Wt 61.2 kg (135 lb)   LMP 2020 (Exact Date)   BMI 21.14 kg/m   Estimated body mass index is 21.14 kg/m  as calculated from the following:    Height as of 20: 1.702 m (5' 7\").    Weight as of this encounter: 61.2 kg (135 lb).  BP completed using cuff size: regular    Questioned patient about current smoking habits.  Pt. has never smoked.          The following HM Due: NONE      The following patient reported/Care Every where data was sent to:  P ABSTRACT QUALITY INITIATIVES [[id number]]        Kaylee Jensen [facility] [address]                 "

## 2020-10-12 NOTE — Clinical Note
Surgeon: Dr Tyler Baer  Assist:  Yes Zonia WILLIAMSON  Location: [facility]  Date/time preference: Patient convenience    Surgery: Total laparoscopic hysterectomy bilateral salpingectomy  Length of Surgery: 2 hours  Diagnosis: Dysmenorrhea unresponsive to medical management, ROWENA-3, sterilization request  Anesthesia type:  GENERAL    Special instructions / equipment:    Am admit or same day: SAME DAY  Bowel prep: Yes magnesium citrate  Pre op: PCP  Office visit with surgeon prior to surgery: Yes  Schedule Post Op: 1-2 weeks and 8 weeks postop

## 2020-10-13 ENCOUNTER — TELEPHONE (OUTPATIENT)
Dept: OBGYN | Facility: CLINIC | Age: 40
End: 2020-10-13

## 2020-10-13 ENCOUNTER — PREP FOR PROCEDURE (OUTPATIENT)
Dept: OBGYN | Facility: CLINIC | Age: 40
End: 2020-10-13

## 2020-10-13 DIAGNOSIS — Z30.2 REQUEST FOR STERILIZATION: ICD-10-CM

## 2020-10-13 DIAGNOSIS — D06.9 CIN III (CERVICAL INTRAEPITHELIAL NEOPLASIA III): ICD-10-CM

## 2020-10-13 DIAGNOSIS — N94.6 DYSMENORRHEA: Primary | ICD-10-CM

## 2020-10-13 NOTE — TELEPHONE ENCOUNTER
Surgeon: Dr Tyler Baer   Assist:  Yes Zonia WILLIAMSON   Location: [facility]   Date/time preference: Patient convenience     Surgery: Total laparoscopic hysterectomy bilateral salpingectomy   Length of Surgery: 2 hours   Diagnosis: Dysmenorrhea unresponsive to medical management, ROWENA-3, sterilization request   Anesthesia type:  GENERAL     Special instructions / equipment:     Am admit or same day: SAME DAY   Bowel prep: Yes magnesium citrate   Pre op: PCP   Office visit with surgeon prior to surgery: Yes   Schedule Post Op: 1-2 weeks and 8 weeks postop

## 2020-10-13 NOTE — PATIENT INSTRUCTIONS
You can reach your [facility] Care Team any time of the day by calling [phone]. This number will put you in touch with the 24 hour nurse line if the clinic is closed.    To contact your OB/GYN Station Coordinator/Surgery Scheduler please call [phone]. This is a direct number for your care team between 8 a.m. and 4 p.m. Monday through Friday.    [facility] is open for your convenience:  Monday through Friday 8 a.m. to 6 p.m.  Closed weekends and all major holidays.

## 2020-10-13 NOTE — TELEPHONE ENCOUNTER
5/16/18 NIL, +HPV 16. Plan colp  12/14/18 Patient is lost to pap tracking follow-up.  7/16/19 [place] ECC: ROWENA 2-3. Plan LEEP - not completed  9/14/20 HSIL pap, + HR HPV 16 & other HR type. Plan LEEP  09/23/20 Pt notified - she has already planned for LEEP per direction of provider  9/29/20 LEEP Bx: ROWENA 1 & ROWENA 3, margins neg for high grade dysplasia, ECC: ROWENA 2. Plan discuss at follow up visit  10/12/20 Follow up visit. Plan hysterectomy  12/15/20 Hysterectomy scheduled

## 2020-10-13 NOTE — TELEPHONE ENCOUNTER
Type of surgery: total laparoscopic hysterectomy, bilateral salpingectomy  Location of surgery: [facility]  Date and time of surgery: 12/15/20 @ 7:30 am  Surgeon: Dr. Baer  Pre-Op Appt Date: Patient advised to schedule.  Post-Op Appt Date: 12/22/20 & 2/9/21   Packet sent out: Yes  Pre-cert/Authorization completed:  No  Date: 10/13/20

## 2020-10-13 NOTE — PROGRESS NOTES
Julisa Tillman is a 40 year old female  OCPs for contraception who on 2019 saw  for evaluation of an HPV screen that was positive for HPV 16 obtained in May 2018 and was associated with a normal Pap smear.  Colposcopic examination revealed the ECC to be positive for ROWENA-2 to ROWENA-3.  It was recommended to the patient that she had a LEEP procedure done.  The patient had not had that procedure done and on 2020 presented to have a LEEP procedure performed.  She presents today for postop follow-up exam.  The patient is doing well with minimal vaginal discharge.  The pathology report revealed the following    FINAL DIAGNOSIS:   A: Cervix, LEEP.   - High grade squamous intraepithelial lesion (ROWENA 3) with areas of gland   involvement and areas of low grade   squamous intraepithelial lesion (ROWENA 1).  Margins negative for high grade   squamous intraepithelial lesion.   Ectocervical margin focally positive for low grade squamous   intraepithelial lesion.     B: Endocervix, curettage.   - Fragment of dysplastic squamous epithelium consistent with high grade   squamous intraepithelial lesion (ROWENA     I reviewed the pathology report with the patient at length I discussed the pathophysiology of ROWENA, the fact that the ectocervical margin was focally positive for a low-grade squamous epithelial lesion.  I discussed how I cauterized an additional 3 to 5 mm on the ectocervix.  I also reviewed with her the positive endocervical curetting.  The endocervical margin of the LEEP was negative for ROWENA.  I reviewed how after removing the LEEP specimen I looked inside the canal and did not see atypia but for completeness sake I always perform a post LEEP ECC and hence the positive lesion.    Past Medical History:   Diagnosis Date     Cervical high risk HPV (human papillomavirus) test positive 2018 NIL, +HPV 16     Major depression, recurrent (H) 2016     Smoker 10/13/2016      ROS: 10 point ROS  neg other than the symptoms noted above in the HPI.  /70   Wt 61.2 kg (135 lb)   LMP 09/12/2020 (Exact Date)   BMI 21.14 kg/m    Constitutional: healthy, alert and no distress  Genitourinary: Normal external genitalia without lesions and speculum multipara appearing cervix LEEP specimen is healing well.  The os is patent.  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 Pathophysiology of the disease process reviewed, all of the patients questions answered and informed consent obtained.  We discussed and expected course of management with follow-up Pap and colposcopy versus a repeat LEEP procedure when the cervix is healed versus in light of her request for permanent sterilization and history of dysmenorrhea proceeding with a total laparoscopic hysterectomy and bilateral salpingectomy.  The risks benefits and alternatives to each of these procedures as well as the issues of persistence of atypia and HPV 16 were thoroughly reviewed.  I gave the patient a detailed written outline today and answered all of her questions  Plan: The patient desires to proceed with a TLH and bilateral salpingectomy.  I will forward the surgical request to our surgery scheduler.  She will have a preop bowel prep with magnesium citrate and see her primary care provider for preop H&P.  I will see her just prior to surgery to review and answer any final questions that she may have.  Pre and postop instructions restrictions postoperatively were reviewed at length she understands and accepts    (N94.6) Dysmenorrhea  Comment: Conservative management has failed to provide complete relief of her symptoms  Plan: In light of her history of ROWENA-3 and request for permanent sterilization the patient would like to proceed with a TLH and bilateral salpingectomy    (Z30.09) Consultation for female  sterilization  Comment: As above  The risks benefits and alt to perm sterilization including the <100% effectiveness were thoroughly discussed.  I think she has a good understanding and all her quesitons have been answered.    Plan: As above  15 min spent w > 50% in counseling

## 2020-10-25 DIAGNOSIS — Z11.59 ENCOUNTER FOR SCREENING FOR OTHER VIRAL DISEASES: Primary | ICD-10-CM

## 2020-11-09 ENCOUNTER — TELEPHONE (OUTPATIENT)
Dept: OBGYN | Facility: CLINIC | Age: 40
End: 2020-11-09

## 2020-11-09 NOTE — TELEPHONE ENCOUNTER
Pt is worried that her 12/15/20 surgery would be considered elective and possibly get pushed out if things close again.    Do you think it would?  She had ROWENA III so I thought maybe not?      Antonia DURHAM R.N.

## 2020-11-09 NOTE — TELEPHONE ENCOUNTER
Alejandra-in light of the path report I do not think this surgery is elective.  Certainly if there is a move  to cancel the surgery I would petition to have the surgery remain on the schedule and not be canceled.  Please advise the patient of this  Thank you for your help  Tyler Baer MD FACOG

## 2020-11-27 NOTE — PATIENT INSTRUCTIONS

## 2020-11-30 ENCOUNTER — OFFICE VISIT (OUTPATIENT)
Dept: OBGYN | Facility: CLINIC | Age: 40
End: 2020-11-30
Payer: COMMERCIAL

## 2020-11-30 ENCOUNTER — OFFICE VISIT (OUTPATIENT)
Dept: FAMILY MEDICINE | Facility: CLINIC | Age: 40
End: 2020-11-30
Payer: COMMERCIAL

## 2020-11-30 VITALS — BODY MASS INDEX: 21.46 KG/M2 | WEIGHT: 137 LBS | DIASTOLIC BLOOD PRESSURE: 62 MMHG | SYSTOLIC BLOOD PRESSURE: 112 MMHG

## 2020-11-30 VITALS
HEART RATE: 72 BPM | WEIGHT: 134 LBS | TEMPERATURE: 99.1 F | SYSTOLIC BLOOD PRESSURE: 112 MMHG | HEIGHT: 67 IN | OXYGEN SATURATION: 97 % | BODY MASS INDEX: 21.03 KG/M2 | DIASTOLIC BLOOD PRESSURE: 62 MMHG

## 2020-11-30 DIAGNOSIS — D06.9 CIN III WITH SEVERE DYSPLASIA: Primary | ICD-10-CM

## 2020-11-30 DIAGNOSIS — F33.42 RECURRENT MAJOR DEPRESSION IN COMPLETE REMISSION (H): ICD-10-CM

## 2020-11-30 DIAGNOSIS — F41.9 ANXIETY: ICD-10-CM

## 2020-11-30 DIAGNOSIS — Z01.818 PREOP GENERAL PHYSICAL EXAM: Primary | ICD-10-CM

## 2020-11-30 DIAGNOSIS — Z23 NEED FOR PROPHYLACTIC VACCINATION AND INOCULATION AGAINST INFLUENZA: ICD-10-CM

## 2020-11-30 DIAGNOSIS — B97.7 HIGH RISK HPV INFECTION: ICD-10-CM

## 2020-11-30 DIAGNOSIS — N94.6 DYSMENORRHEA: ICD-10-CM

## 2020-11-30 DIAGNOSIS — D06.9 CIN III WITH SEVERE DYSPLASIA: ICD-10-CM

## 2020-11-30 PROCEDURE — 90686 IIV4 VACC NO PRSV 0.5 ML IM: CPT | Performed by: PHYSICIAN ASSISTANT

## 2020-11-30 PROCEDURE — 99213 OFFICE O/P EST LOW 20 MIN: CPT | Performed by: OBSTETRICS & GYNECOLOGY

## 2020-11-30 PROCEDURE — 99214 OFFICE O/P EST MOD 30 MIN: CPT | Mod: 25 | Performed by: PHYSICIAN ASSISTANT

## 2020-11-30 PROCEDURE — 90471 IMMUNIZATION ADMIN: CPT | Performed by: PHYSICIAN ASSISTANT

## 2020-11-30 ASSESSMENT — ANXIETY QUESTIONNAIRES
GAD7 TOTAL SCORE: 0
7. FEELING AFRAID AS IF SOMETHING AWFUL MIGHT HAPPEN: NOT AT ALL
2. NOT BEING ABLE TO STOP OR CONTROL WORRYING: NOT AT ALL
5. BEING SO RESTLESS THAT IT IS HARD TO SIT STILL: NOT AT ALL
6. BECOMING EASILY ANNOYED OR IRRITABLE: NOT AT ALL
IF YOU CHECKED OFF ANY PROBLEMS ON THIS QUESTIONNAIRE, HOW DIFFICULT HAVE THESE PROBLEMS MADE IT FOR YOU TO DO YOUR WORK, TAKE CARE OF THINGS AT HOME, OR GET ALONG WITH OTHER PEOPLE: NOT DIFFICULT AT ALL
3. WORRYING TOO MUCH ABOUT DIFFERENT THINGS: NOT AT ALL
1. FEELING NERVOUS, ANXIOUS, OR ON EDGE: NOT AT ALL

## 2020-11-30 ASSESSMENT — PATIENT HEALTH QUESTIONNAIRE - PHQ9
5. POOR APPETITE OR OVEREATING: NOT AT ALL
SUM OF ALL RESPONSES TO PHQ QUESTIONS 1-9: 2

## 2020-11-30 ASSESSMENT — MIFFLIN-ST. JEOR: SCORE: 1300.45

## 2020-11-30 NOTE — PROGRESS NOTES
[facility]  [address]  [facility] [place] [address]  Phone: [phone]  Fax: [phone]  Primary Provider: Mary Pham  Pre-op Performing Provider: MARY PHAM    PREOPERATIVE EVALUATION:  Today's date: 11/30/2020    Julisa Tillman is a 40 year old female who presents for a preoperative evaluation.    Surgical Information:  Surgery/Procedure: TOTAL LAPAROSCOPIC HYSTERECTOMY, BILATERAL SALPINGECTOMY  Surgery Location: [facility]  Surgeon: Dr. Zonia Harvey  Surgery Date:  12/15/20  Time of Surgery: 7:30 am  Where patient plans to recover: At home with family  Fax number for surgical facility: Note does not need to be faxed, will be available electronically in Epic.    Type of Anesthesia Anticipated: General    Subjective     HPI related to upcoming procedure: Julisa is a 39 yo female here today for pre-op. Hx of persistent ROWENA III despite LEEP, dysmenorrhea and done with child bearing. Thus, consulted with OB/GYN and now pursuing TLH with bilateral salpingectomy.   LMP: 11/29/2020.    Preop Questions 11/30/2020   1. Have you ever had a heart attack or stroke? No   2. Have you ever had surgery on your heart or blood vessels, such as a stent placement, a coronary artery bypass, or surgery on an artery in your head, neck, heart, or legs? No   3. Do you have chest pain with activity? No   4. Do you have a history of  heart failure? No   5. Do you currently have a cold, bronchitis or symptoms of other infection? No   6. Do you have a cough, shortness of breath, or wheezing? No   7. Do you or anyone in your family have previous history of blood clots? No   8. Do you or does anyone in your family have a serious bleeding problem such as prolonged bleeding following surgeries or cuts? No   9. Have you ever had problems with anemia or been told to take iron pills? No   10. Have you had any abnormal blood loss such as black, tarry or bloody stools, or abnormal  vaginal bleeding? No   11. Have you ever had a blood transfusion? No   12. Are you willing to have a blood transfusion if it is medically needed before, during, or after your surgery? Yes   13. Have you or any of your relatives ever had problems with anesthesia? YES - gets N/V   14. Do you have sleep apnea, excessive snoring or daytime drowsiness? No   15. Do you have any artifical heart valves or other implanted medical devices like a pacemaker, defibrillator, or continuous glucose monitor? No   16. Do you have artificial joints? No   17. Are you allergic to latex? No   18. Is there any chance that you may be pregnant? No       Health Care Directive:  Patient does not have a Health Care Directive or Living Will: Discussed advance care planning with patient; information given to patient to review.    Preoperative Review of : not done        Review of Systems  CONSTITUTIONAL: NEGATIVE for fever, chills, change in weight  INTEGUMENTARY/SKIN: NEGATIVE for worrisome rashes, moles or lesions  EYES: NEGATIVE for vision changes or irritation  ENT/MOUTH: NEGATIVE for ear, mouth and throat problems  RESP: NEGATIVE for significant cough or SOB  CV: NEGATIVE for chest pain, palpitations or peripheral edema  GI: NEGATIVE for nausea, abdominal pain, heartburn, or change in bowel habits  : NEGATIVE for frequency, dysuria, or hematuria  MUSCULOSKELETAL: NEGATIVE for significant arthralgias or myalgia  NEURO: NEGATIVE for weakness, dizziness or paresthesias  ENDOCRINE: NEGATIVE for temperature intolerance, skin/hair changes  HEME: NEGATIVE for bleeding problems  PSYCHIATRIC: NEGATIVE for changes in mood or affect    Patient Active Problem List    Diagnosis Date Noted     Dysmenorrhea 10/13/2020     Priority: Medium     Added automatically from request for surgery [id number]       ROWENA III (cervical intraepithelial neoplasia III) 10/13/2020     Priority: Medium     Added automatically from request for surgery [id number]       Request  for sterilization 10/13/2020     Priority: Medium     Added automatically from request for surgery [id number]       High risk HPV infection 09/16/2020     Priority: Medium     ROWENA III with severe dysplasia 09/16/2020     Priority: Medium     5/16/18 NIL, +HPV 16. Plan colp  12/14/18 Patient is lost to pap tracking follow-up.  7/16/19 [place] ECC: ROWENA 2-3. Plan LEEP - not completed  9/14/20 [facility]IL pap, + HR HPV 16 & other HR type. Plan LEEP  09/23/20 Pt notified - she has already planned for LEEP per direction of provider  9/29/20 LEEP Bx: ROWENA 1 & ROWENA 3, margins neg for high grade dysplasia, ECC: ROWENA 2. Plan discuss at follow up visit  10/12/20 Follow up visit. Plan hysterectomy  12/15/20 Hysterectomy scheduled         Consultation for female sterilization 08/24/2020     Priority: Medium     Vaginal itching 08/24/2020     Priority: Medium     Eating disorder - anorexia, bulimia and exercise bulimia. Issues off/on since age 6.  06/11/2019     Priority: Medium     Suicidal ideation 12/24/2018     Priority: Medium     Former smoker 10/26/2018     Priority: Medium     Major depression, recurrent (H) 10/26/2018     Priority: Medium     Cervical high risk HPV (human papillomavirus) test positive 05/16/2018     Priority: Medium              Anxiety 07/17/2017     Priority: Medium     Anxiety attack 07/17/2017     Priority: Medium      Past Medical History:   Diagnosis Date     Cervical high risk HPV (human papillomavirus) test positive 05/16/2018 5/16/18 NIL, +HPV 16     Major depression, recurrent (H) 12/12/2016     Smoker 10/13/2016     Past Surgical History:   Procedure Laterality Date     ENT SURGERY       LEEP TX, CERVICAL  09/29/2020     Current Outpatient Medications   Medication Sig Dispense Refill     ARIPiprazole (ABILIFY) 2 MG tablet TAKE 1 TABLET(2 MG) BY MOUTH DAILY 30 tablet 0     escitalopram (LEXAPRO) 20 MG tablet Take 1 tablet (20 mg) by mouth daily 90 tablet 0     fluticasone (FLONASE) 50 MCG/ACT spray Spray 1  spray into both nostrils daily       levonorgestrel-ethinyl estradiol (AVIANE) 0.1-20 MG-MCG tablet Take 1 tablet by mouth daily 84 tablet 3     propranolol (INDERAL) 20 MG tablet Take 1 tablet (20 mg) by mouth 3 times daily as needed (anxiety) 90 tablet 2     spironolactone (ALDACTONE) 100 MG tablet        traZODone (DESYREL) 50 MG tablet Take 1 tablet (50 mg) by mouth nightly as needed for sleep 30 tablet 2       No Known Allergies     Social History     Tobacco Use     Smoking status: Former Smoker     Packs/day: 0.25     Years: 20.00     Pack years: 5.00     Quit date: 2018     Years since quittin.4     Smokeless tobacco: Never Used   Substance Use Topics     Alcohol use: No       History   Drug Use No         Objective     LMP 2020     Physical Exam    GENERAL APPEARANCE: healthy, alert and no distress     EYES: EOMI, PERRL     HENT: ear canals and TM's normal and nose and mouth without ulcers or lesions     NECK: no adenopathy, no asymmetry, masses, or scars and thyroid normal to palpation     RESP: lungs clear to auscultation - no rales, rhonchi or wheezes     CV: regular rates and rhythm, normal S1 S2, no S3 or S4 and no murmur, click or rub     ABDOMEN:  soft, nontender, no HSM or masses and bowel sounds normal     MS: extremities normal- no gross deformities noted, no evidence of inflammation in joints, FROM in all extremities.     SKIN: no suspicious lesions or rashes     NEURO: Normal strength and tone, sensory exam grossly normal, mentation intact and speech normal     PSYCH: mentation appears normal. and affect normal/bright     LYMPHATICS: No cervical adenopathy    Recent Labs   Lab Test 18  0800   HGB 14.1         POTASSIUM 4.9   CR 0.81        Diagnostics:  No labs were ordered during this visit.   No EKG required, no history of coronary heart disease, significant arrhythmia, peripheral arterial disease or other structural heart disease.    Revised Cardiac Risk  Index (RCRI):  The patient has the following serious cardiovascular risks for perioperative complications:   - No serious cardiac risks = 0 points     RCRI Interpretation: 0 points: Class I (very low risk - 0.4% complication rate)         Assessment & Plan   The proposed surgical procedure is considered INTERMEDIATE risk.    Julisa was seen today for pre-op exam.    Diagnoses and all orders for this visit:    Preop general physical exam    Dysmenorrhea    ROWENA III with severe dysplasia    Anxiety - Ongoing care with psychiatry, Jennifer Gutierrez    Recurrent major depression in complete remission (H) - Ongoing care with psychiatry, Jennifer Gutierrez    Need for prophylactic vaccination and inoculation against influenza  -     INFLUENZA VACCINE IM > 6 MONTHS VALENT IIV4 [44308]           Risks and Recommendations:  The patient has the following additional risks and recommendations for perioperative complications:   - No identified additional risk factors other than previously addressed    Medication Instructions:   - SSRIs, SNRIs, TCAs, Antipsychotics: Continue without modification.     RECOMMENDATION:  APPROVAL GIVEN to proceed with proposed procedure, without further diagnostic evaluation.    Signed Electronically by: Carmen Pham PA-C    Copy of this evaluation report is provided to requesting physician.    Preop [facility] Preop Guidelines    Revised Cardiac Risk Index

## 2020-11-30 NOTE — PATIENT INSTRUCTIONS
You can reach your [facility] Care Team any time of the day by calling [phone]. This number will put you in touch with the 24 hour nurse line if the clinic is closed.    To contact your OB/GYN Station Coordinator/Surgery Scheduler please call [phone]. This is a direct number for your care team between 8 a.m. and 4 p.m. Monday through Friday.    [facility] is open for your convenience:  Monday through Friday 8 a.m. to 6 p.m.  Closed weekends and all major holidays.

## 2020-11-30 NOTE — PROGRESS NOTES
Julisa Tillman is a 40 year old female  on OCPs for contraception who presents today for a final pre op eval prior to scheduled TLH bilateral salpingectomy scheduled for Tues 12/15/20.   I reviewed her past history and on 2019 the patient saw Dr. Serrano for evaluation of an HPV screen positive for HPV 16 in May 2018 with a normal Pap.  Colposcopic examination revealed the ECC to be positive for ROWENA-2 to ROWENA-3.  It was recommended to the patient that she have a LEEP procedure done the patient did  not have that done.  The pt did have a LEEP procedure done on 20 which showed the following    FINAL DIAGNOSIS:   A: Cervix, LEEP.   - High grade squamous intraepithelial lesion (ROWENA 3) with areas of gland   involvement and areas of low grade   squamous intraepithelial lesion (ROWENA 1).  Margins negative for high grade   squamous intraepithelial lesion.   Ectocervical margin focally positive for low grade squamous   intraepithelial lesion.     B: Endocervix, curettage.   - Fragment of dysplastic squamous epithelium consistent with high grade   squamous intraepithelial lesion (ROWENA   2).     THe pt also has significant dysmenorrhea that  has been unresponsive to medical mgmnt.  She is al;so requesting permanent sterilization.  The risks benefits and alt to perm sterilization including the <100% effectiveness were thoroughly discussed.  I think she has a good understanding and all her quesitons have been answered.  After these lengthy discussions the pt has made a decision to proceed with a TLH,  The pt presents today for a final preop exam.  Preop findings the risks the benefits the alternative forms of therapy pre and postop instructions were reviewed with the patient at length.  I think she has a good understanding.  All of her questions been answered and informed consent was obtained    \  Past Medical History:   Diagnosis Date     Cervical high risk HPV (human papillomavirus) test positive 2018     5/16/18 NIL, +HPV 16     Major depression, recurrent (H) 12/12/2016     Smoker 10/13/2016     Current Outpatient Medications   Medication     ARIPiprazole (ABILIFY) 2 MG tablet     escitalopram (LEXAPRO) 20 MG tablet     fluticasone (FLONASE) 50 MCG/ACT spray     levonorgestrel-ethinyl estradiol (AVIANE) 0.1-20 MG-MCG tablet     propranolol (INDERAL) 20 MG tablet     spironolactone (ALDACTONE) 100 MG tablet     traZODone (DESYREL) 50 MG tablet     No current facility-administered medications for this visit.      Past Surgical History:   Procedure Laterality Date     ENT SURGERY       LEEP TX, CERVICAL  09/29/2020      ROS: 10 point ROS neg other than the symptoms noted above in the HPI.  /62   Wt 62.1 kg (137 lb)   LMP 11/29/2020   BMI 21.46 kg/m    Constitutional: healthy, alert and no distress    (D06.9) ROWENA III with severe dysplasia  (primary encounter diagnosis)  Comment: Persistent disease after previous LEEP procedure in a patient with significant dysmenorrhea and her request recommended sterilization her proceed with a TLH and bilateral salpingectomy with ovarian preservation.  Pre and postop instructions reviewed.  Plan: Patient has a preop bowel prep with magnesium citr less than 100% efficacy and the need for close follow-up postoperatively was reviewed with the patient at length.  I gave her a detailed written outline of our discussion    (B97.7) High risk HPV infection  Comment: As above   Plan: As above early pregnancy    (N94.6) Dysmenorrhea  Comment: Risks, benefits, and alternative modes of therapy discussed at length. Pathophysiology of the disease process reviewed, all of the patients questions answered and informed consent obtained.    Plan: We will proceed with TLH bilateral salpingectomy with ovarian preservation with a preop bowel prep with mag citrate.  Plan of our discussion including pre and post transfer cares were reviewed.    She understands accepts.  She has a preop physical  exam with primary care this afternoon

## 2020-12-01 PROBLEM — R45.851 SUICIDAL IDEATION: Status: RESOLVED | Noted: 2018-12-24 | Resolved: 2020-12-01

## 2020-12-01 ASSESSMENT — ANXIETY QUESTIONNAIRES: GAD7 TOTAL SCORE: 0

## 2020-12-04 PROBLEM — D06.9 CIN III (CERVICAL INTRAEPITHELIAL NEOPLASIA III): Status: ACTIVE | Noted: 2020-10-13

## 2020-12-09 RX ORDER — CETIRIZINE HYDROCHLORIDE 10 MG/1
10 TABLET ORAL DAILY PRN
COMMUNITY

## 2020-12-11 ENCOUNTER — TELEPHONE (OUTPATIENT)
Dept: OBGYN | Facility: CLINIC | Age: 40
End: 2020-12-11

## 2020-12-11 DIAGNOSIS — Z11.59 ENCOUNTER FOR SCREENING FOR OTHER VIRAL DISEASES: ICD-10-CM

## 2020-12-11 PROCEDURE — U0003 INFECTIOUS AGENT DETECTION BY NUCLEIC ACID (DNA OR RNA); SEVERE ACUTE RESPIRATORY SYNDROME CORONAVIRUS 2 (SARS-COV-2) (CORONAVIRUS DISEASE [COVID-19]), AMPLIFIED PROBE TECHNIQUE, MAKING USE OF HIGH THROUGHPUT TECHNOLOGIES AS DESCRIBED BY CMS-2020-01-R: HCPCS | Performed by: OBSTETRICS & GYNECOLOGY

## 2020-12-11 NOTE — TELEPHONE ENCOUNTER
Form received from: Pt; Julisa Tillman     Form requesting following info/need: FMLA forms for [employer]     VILMA needed?: NA     Location of form: Basket above Jossy's desk     When completed the route for return: Please fax to number on form. Per pt no need to  a copy for herself

## 2020-12-12 LAB
SARS-COV-2 RNA SPEC QL NAA+PROBE: NOT DETECTED
SPECIMEN SOURCE: NORMAL

## 2020-12-14 ENCOUNTER — HOSPITAL ENCOUNTER (OUTPATIENT)
Dept: LAB | Facility: CLINIC | Age: 40
Discharge: HOME | End: 2020-12-14
Attending: OBSTETRICS & GYNECOLOGY | Admitting: OBSTETRICS & GYNECOLOGY
Payer: COMMERCIAL

## 2020-12-14 DIAGNOSIS — Z01.812 PRE-OPERATIVE LABORATORY EXAMINATION: ICD-10-CM

## 2020-12-14 LAB
ABO + RH BLD: NORMAL
ABO + RH BLD: NORMAL
BLD GP AB SCN SERPL QL: NORMAL
BLOOD BANK CMNT PATIENT-IMP: NORMAL
HGB BLD-MCNC: 14.4 G/DL (ref 11.7–15.7)
SPECIMEN EXP DATE BLD: NORMAL

## 2020-12-14 PROCEDURE — 85018 HEMOGLOBIN: CPT | Performed by: OBSTETRICS & GYNECOLOGY

## 2020-12-14 PROCEDURE — 86900 BLOOD TYPING SEROLOGIC ABO: CPT | Performed by: OBSTETRICS & GYNECOLOGY

## 2020-12-14 PROCEDURE — 86850 RBC ANTIBODY SCREEN: CPT | Performed by: OBSTETRICS & GYNECOLOGY

## 2020-12-14 PROCEDURE — 36415 COLL VENOUS BLD VENIPUNCTURE: CPT | Performed by: OBSTETRICS & GYNECOLOGY

## 2020-12-14 PROCEDURE — 86901 BLOOD TYPING SEROLOGIC RH(D): CPT | Performed by: OBSTETRICS & GYNECOLOGY

## 2020-12-15 ENCOUNTER — ANESTHESIA (OUTPATIENT)
Dept: SURGERY | Facility: CLINIC | Age: 40
End: 2020-12-15
Payer: COMMERCIAL

## 2020-12-15 ENCOUNTER — HOSPITAL ENCOUNTER (OUTPATIENT)
Facility: CLINIC | Age: 40
Discharge: HOME | End: 2020-12-16
Attending: OBSTETRICS & GYNECOLOGY | Admitting: OBSTETRICS & GYNECOLOGY
Payer: COMMERCIAL

## 2020-12-15 ENCOUNTER — SURGERY (OUTPATIENT)
Age: 40
End: 2020-12-15
Payer: COMMERCIAL

## 2020-12-15 ENCOUNTER — ANESTHESIA EVENT (OUTPATIENT)
Dept: SURGERY | Facility: CLINIC | Age: 40
End: 2020-12-15
Payer: COMMERCIAL

## 2020-12-15 DIAGNOSIS — D06.9 CIN III (CERVICAL INTRAEPITHELIAL NEOPLASIA III): ICD-10-CM

## 2020-12-15 DIAGNOSIS — N94.6 DYSMENORRHEA: ICD-10-CM

## 2020-12-15 DIAGNOSIS — Z30.2 REQUEST FOR STERILIZATION: ICD-10-CM

## 2020-12-15 DIAGNOSIS — Z98.890 POST-OPERATIVE STATE: Primary | ICD-10-CM

## 2020-12-15 LAB — HCG UR QL: NEGATIVE

## 2020-12-15 PROCEDURE — 360N000027 HC SURGERY LEVEL 4 EA 15 ADDTL MIN: Performed by: OBSTETRICS & GYNECOLOGY

## 2020-12-15 PROCEDURE — 250N000009 HC RX 250: Performed by: NURSE ANESTHETIST, CERTIFIED REGISTERED

## 2020-12-15 PROCEDURE — 761N000002 HC RECOVERY PHASE 1 LEVEL 1 EA ADDTL HR: Performed by: OBSTETRICS & GYNECOLOGY

## 2020-12-15 PROCEDURE — 250N000011 HC RX IP 250 OP 636: Performed by: ANESTHESIOLOGY

## 2020-12-15 PROCEDURE — 258N000001 HC RX 258: Performed by: OBSTETRICS & GYNECOLOGY

## 2020-12-15 PROCEDURE — 58571 TLH W/T/O 250 G OR LESS: CPT | Performed by: OBSTETRICS & GYNECOLOGY

## 2020-12-15 PROCEDURE — 360N000026 HC SURGERY LEVEL 4 1ST 30 MIN: Performed by: OBSTETRICS & GYNECOLOGY

## 2020-12-15 PROCEDURE — 258N000003 HC RX IP 258 OP 636: Performed by: ANESTHESIOLOGY

## 2020-12-15 PROCEDURE — 250N000011 HC RX IP 250 OP 636: Performed by: OBSTETRICS & GYNECOLOGY

## 2020-12-15 PROCEDURE — 250N000013 HC RX MED GY IP 250 OP 250 PS 637: Performed by: ANESTHESIOLOGY

## 2020-12-15 PROCEDURE — 250N000009 HC RX 250: Performed by: ANESTHESIOLOGY

## 2020-12-15 PROCEDURE — 272N000001 HC OR GENERAL SUPPLY STERILE: Performed by: OBSTETRICS & GYNECOLOGY

## 2020-12-15 PROCEDURE — 81025 URINE PREGNANCY TEST: CPT | Performed by: ANESTHESIOLOGY

## 2020-12-15 PROCEDURE — 58571 TLH W/T/O 250 G OR LESS: CPT | Mod: 80 | Performed by: OBSTETRICS & GYNECOLOGY

## 2020-12-15 PROCEDURE — 88309 TISSUE EXAM BY PATHOLOGIST: CPT | Mod: 26 | Performed by: PATHOLOGY

## 2020-12-15 PROCEDURE — 999N000136 HC STATISTIC PRE PROC ASSESS II: Performed by: OBSTETRICS & GYNECOLOGY

## 2020-12-15 PROCEDURE — 250N000013 HC RX MED GY IP 250 OP 250 PS 637: Performed by: OBSTETRICS & GYNECOLOGY

## 2020-12-15 PROCEDURE — 370N000002 HC ANESTHESIA TECHNICAL FEE, EACH ADDTL 15 MIN: Performed by: OBSTETRICS & GYNECOLOGY

## 2020-12-15 PROCEDURE — 250N000011 HC RX IP 250 OP 636: Performed by: NURSE ANESTHETIST, CERTIFIED REGISTERED

## 2020-12-15 PROCEDURE — 370N000001 HC ANESTHESIA TECHNICAL FEE, 1ST 30 MIN: Performed by: OBSTETRICS & GYNECOLOGY

## 2020-12-15 PROCEDURE — 250N000009 HC RX 250: Performed by: OBSTETRICS & GYNECOLOGY

## 2020-12-15 PROCEDURE — 761N000001 HC RECOVERY PHASE 1 LEVEL 1 FIRST HR: Performed by: OBSTETRICS & GYNECOLOGY

## 2020-12-15 PROCEDURE — 88309 TISSUE EXAM BY PATHOLOGIST: CPT | Mod: TC | Performed by: OBSTETRICS & GYNECOLOGY

## 2020-12-15 RX ORDER — SODIUM CHLORIDE, SODIUM LACTATE, POTASSIUM CHLORIDE, CALCIUM CHLORIDE 600; 310; 30; 20 MG/100ML; MG/100ML; MG/100ML; MG/100ML
INJECTION, SOLUTION INTRAVENOUS CONTINUOUS
Status: DISCONTINUED | OUTPATIENT
Start: 2020-12-15 | End: 2020-12-15 | Stop reason: HOSPADM

## 2020-12-15 RX ORDER — CEFAZOLIN SODIUM 2 G/100ML
2 INJECTION, SOLUTION INTRAVENOUS
Status: COMPLETED | OUTPATIENT
Start: 2020-12-15 | End: 2020-12-15

## 2020-12-15 RX ORDER — FENTANYL CITRATE 50 UG/ML
INJECTION, SOLUTION INTRAMUSCULAR; INTRAVENOUS PRN
Status: DISCONTINUED | OUTPATIENT
Start: 2020-12-15 | End: 2020-12-15

## 2020-12-15 RX ORDER — PROPOFOL 10 MG/ML
INJECTION, EMULSION INTRAVENOUS PRN
Status: DISCONTINUED | OUTPATIENT
Start: 2020-12-15 | End: 2020-12-15

## 2020-12-15 RX ORDER — ONDANSETRON 4 MG/1
4 TABLET, ORALLY DISINTEGRATING ORAL EVERY 6 HOURS PRN
Status: DISCONTINUED | OUTPATIENT
Start: 2020-12-15 | End: 2020-12-16 | Stop reason: HOSPADM

## 2020-12-15 RX ORDER — PROPOFOL 10 MG/ML
INJECTION, EMULSION INTRAVENOUS CONTINUOUS PRN
Status: DISCONTINUED | OUTPATIENT
Start: 2020-12-15 | End: 2020-12-15

## 2020-12-15 RX ORDER — ACETAMINOPHEN 325 MG/1
975 TABLET ORAL ONCE
Status: COMPLETED | OUTPATIENT
Start: 2020-12-15 | End: 2020-12-15

## 2020-12-15 RX ORDER — KETOROLAC TROMETHAMINE 30 MG/ML
INJECTION, SOLUTION INTRAMUSCULAR; INTRAVENOUS PRN
Status: DISCONTINUED | OUTPATIENT
Start: 2020-12-15 | End: 2020-12-15

## 2020-12-15 RX ORDER — PROPOFOL 10 MG/ML
INJECTION, EMULSION INTRAVENOUS
Status: DISCONTINUED | OUTPATIENT
Start: 2020-12-15 | End: 2020-12-15

## 2020-12-15 RX ORDER — OXYCODONE HYDROCHLORIDE 5 MG/1
5-10 TABLET ORAL
Qty: 10 TABLET | Refills: 0 | Status: SHIPPED | OUTPATIENT
Start: 2020-12-15 | End: 2020-12-30

## 2020-12-15 RX ORDER — NALOXONE HYDROCHLORIDE 0.4 MG/ML
0.2 INJECTION, SOLUTION INTRAMUSCULAR; INTRAVENOUS; SUBCUTANEOUS
Status: DISCONTINUED | OUTPATIENT
Start: 2020-12-15 | End: 2020-12-16 | Stop reason: HOSPADM

## 2020-12-15 RX ORDER — ONDANSETRON 2 MG/ML
INJECTION INTRAMUSCULAR; INTRAVENOUS PRN
Status: DISCONTINUED | OUTPATIENT
Start: 2020-12-15 | End: 2020-12-15

## 2020-12-15 RX ORDER — ACETAMINOPHEN 325 MG/1
650 TABLET ORAL EVERY 6 HOURS PRN
Status: DISCONTINUED | OUTPATIENT
Start: 2020-12-15 | End: 2020-12-16 | Stop reason: HOSPADM

## 2020-12-15 RX ORDER — LIDOCAINE HYDROCHLORIDE 10 MG/ML
INJECTION, SOLUTION INFILTRATION; PERINEURAL PRN
Status: DISCONTINUED | OUTPATIENT
Start: 2020-12-15 | End: 2020-12-15

## 2020-12-15 RX ORDER — ONDANSETRON 2 MG/ML
4 INJECTION INTRAMUSCULAR; INTRAVENOUS EVERY 6 HOURS PRN
Status: DISCONTINUED | OUTPATIENT
Start: 2020-12-15 | End: 2020-12-16 | Stop reason: HOSPADM

## 2020-12-15 RX ORDER — BUPIVACAINE HYDROCHLORIDE AND EPINEPHRINE 5; 5 MG/ML; UG/ML
30 INJECTION, SOLUTION PERINEURAL ONCE
Status: DISCONTINUED | OUTPATIENT
Start: 2020-12-15 | End: 2020-12-15 | Stop reason: RX

## 2020-12-15 RX ORDER — GLYCOPYRROLATE 0.2 MG/ML
INJECTION, SOLUTION INTRAMUSCULAR; INTRAVENOUS PRN
Status: DISCONTINUED | OUTPATIENT
Start: 2020-12-15 | End: 2020-12-15

## 2020-12-15 RX ORDER — SCOLOPAMINE TRANSDERMAL SYSTEM 1 MG/1
1 PATCH, EXTENDED RELEASE TRANSDERMAL ONCE
Status: COMPLETED | OUTPATIENT
Start: 2020-12-15 | End: 2020-12-16

## 2020-12-15 RX ORDER — LABETALOL 20 MG/4 ML (5 MG/ML) INTRAVENOUS SYRINGE
10
Status: DISCONTINUED | OUTPATIENT
Start: 2020-12-15 | End: 2020-12-15 | Stop reason: HOSPADM

## 2020-12-15 RX ORDER — NALOXONE HYDROCHLORIDE 0.4 MG/ML
0.4 INJECTION, SOLUTION INTRAMUSCULAR; INTRAVENOUS; SUBCUTANEOUS
Status: DISCONTINUED | OUTPATIENT
Start: 2020-12-15 | End: 2020-12-15 | Stop reason: HOSPADM

## 2020-12-15 RX ORDER — NALOXONE HYDROCHLORIDE 0.4 MG/ML
0.4 INJECTION, SOLUTION INTRAMUSCULAR; INTRAVENOUS; SUBCUTANEOUS
Status: DISCONTINUED | OUTPATIENT
Start: 2020-12-15 | End: 2020-12-16 | Stop reason: HOSPADM

## 2020-12-15 RX ORDER — IBUPROFEN 200 MG
400 TABLET ORAL EVERY 4 HOURS PRN
COMMUNITY
Start: 2020-12-15 | End: 2021-02-09

## 2020-12-15 RX ORDER — CEFAZOLIN SODIUM 1 G/3ML
1 INJECTION, POWDER, FOR SOLUTION INTRAMUSCULAR; INTRAVENOUS SEE ADMIN INSTRUCTIONS
Status: DISCONTINUED | OUTPATIENT
Start: 2020-12-15 | End: 2020-12-15 | Stop reason: HOSPADM

## 2020-12-15 RX ORDER — ONDANSETRON 2 MG/ML
4 INJECTION INTRAMUSCULAR; INTRAVENOUS EVERY 30 MIN PRN
Status: DISCONTINUED | OUTPATIENT
Start: 2020-12-15 | End: 2020-12-15 | Stop reason: HOSPADM

## 2020-12-15 RX ORDER — LIDOCAINE 40 MG/G
CREAM TOPICAL
Status: DISCONTINUED | OUTPATIENT
Start: 2020-12-15 | End: 2020-12-16 | Stop reason: HOSPADM

## 2020-12-15 RX ORDER — IBUPROFEN 600 MG/1
600 TABLET, FILM COATED ORAL EVERY 6 HOURS PRN
Status: DISCONTINUED | OUTPATIENT
Start: 2020-12-16 | End: 2020-12-16 | Stop reason: HOSPADM

## 2020-12-15 RX ORDER — DEXAMETHASONE SODIUM PHOSPHATE 4 MG/ML
INJECTION, SOLUTION INTRA-ARTICULAR; INTRALESIONAL; INTRAMUSCULAR; INTRAVENOUS; SOFT TISSUE PRN
Status: DISCONTINUED | OUTPATIENT
Start: 2020-12-15 | End: 2020-12-15

## 2020-12-15 RX ORDER — NALOXONE HYDROCHLORIDE 0.4 MG/ML
0.2 INJECTION, SOLUTION INTRAMUSCULAR; INTRAVENOUS; SUBCUTANEOUS
Status: DISCONTINUED | OUTPATIENT
Start: 2020-12-15 | End: 2020-12-15 | Stop reason: HOSPADM

## 2020-12-15 RX ORDER — NEOSTIGMINE METHYLSULFATE 1 MG/ML
VIAL (ML) INJECTION PRN
Status: DISCONTINUED | OUTPATIENT
Start: 2020-12-15 | End: 2020-12-15

## 2020-12-15 RX ORDER — BUPIVACAINE HYDROCHLORIDE 2.5 MG/ML
INJECTION, SOLUTION EPIDURAL; INFILTRATION; INTRACAUDAL PRN
Status: DISCONTINUED | OUTPATIENT
Start: 2020-12-15 | End: 2020-12-15 | Stop reason: HOSPADM

## 2020-12-15 RX ORDER — KETOROLAC TROMETHAMINE 30 MG/ML
30 INJECTION, SOLUTION INTRAMUSCULAR; INTRAVENOUS EVERY 6 HOURS
Status: DISPENSED | OUTPATIENT
Start: 2020-12-15 | End: 2020-12-16

## 2020-12-15 RX ORDER — BUPIVACAINE HYDROCHLORIDE AND EPINEPHRINE 5; 5 MG/ML; UG/ML
INJECTION, SOLUTION PERINEURAL PRN
Status: DISCONTINUED | OUTPATIENT
Start: 2020-12-15 | End: 2020-12-15 | Stop reason: HOSPADM

## 2020-12-15 RX ORDER — BUPIVACAINE HYDROCHLORIDE AND EPINEPHRINE 5; 5 MG/ML; UG/ML
30 INJECTION, SOLUTION EPIDURAL; INTRACAUDAL; PERINEURAL ONCE
Status: DISCONTINUED | OUTPATIENT
Start: 2020-12-15 | End: 2020-12-16 | Stop reason: HOSPADM

## 2020-12-15 RX ORDER — LIDOCAINE 40 MG/G
CREAM TOPICAL
Status: DISCONTINUED | OUTPATIENT
Start: 2020-12-15 | End: 2020-12-15 | Stop reason: HOSPADM

## 2020-12-15 RX ORDER — MEPERIDINE HYDROCHLORIDE 25 MG/ML
12.5 INJECTION INTRAMUSCULAR; INTRAVENOUS; SUBCUTANEOUS
Status: DISCONTINUED | OUTPATIENT
Start: 2020-12-15 | End: 2020-12-15 | Stop reason: HOSPADM

## 2020-12-15 RX ORDER — HYDROMORPHONE HYDROCHLORIDE 1 MG/ML
.3-.5 INJECTION, SOLUTION INTRAMUSCULAR; INTRAVENOUS; SUBCUTANEOUS EVERY 10 MIN PRN
Status: DISCONTINUED | OUTPATIENT
Start: 2020-12-15 | End: 2020-12-15 | Stop reason: HOSPADM

## 2020-12-15 RX ORDER — FENTANYL CITRATE 50 UG/ML
25-50 INJECTION, SOLUTION INTRAMUSCULAR; INTRAVENOUS
Status: DISCONTINUED | OUTPATIENT
Start: 2020-12-15 | End: 2020-12-15 | Stop reason: HOSPADM

## 2020-12-15 RX ORDER — ACETAMINOPHEN 325 MG/1
650 TABLET ORAL EVERY 4 HOURS PRN
Qty: 100 TABLET | Refills: 0 | COMMUNITY
Start: 2020-12-15 | End: 2021-02-09

## 2020-12-15 RX ORDER — SODIUM CHLORIDE, SODIUM LACTATE, POTASSIUM CHLORIDE, CALCIUM CHLORIDE 600; 310; 30; 20 MG/100ML; MG/100ML; MG/100ML; MG/100ML
INJECTION, SOLUTION INTRAVENOUS CONTINUOUS
Status: DISCONTINUED | OUTPATIENT
Start: 2020-12-15 | End: 2020-12-16 | Stop reason: HOSPADM

## 2020-12-15 RX ORDER — OXYCODONE HYDROCHLORIDE 5 MG/1
5-10 TABLET ORAL
Status: DISCONTINUED | OUTPATIENT
Start: 2020-12-15 | End: 2020-12-16 | Stop reason: HOSPADM

## 2020-12-15 RX ORDER — PHENAZOPYRIDINE HYDROCHLORIDE 200 MG/1
200 TABLET, FILM COATED ORAL ONCE
Status: DISCONTINUED | OUTPATIENT
Start: 2020-12-15 | End: 2020-12-15 | Stop reason: HOSPADM

## 2020-12-15 RX ORDER — ONDANSETRON 4 MG/1
4 TABLET, ORALLY DISINTEGRATING ORAL EVERY 30 MIN PRN
Status: DISCONTINUED | OUTPATIENT
Start: 2020-12-15 | End: 2020-12-15 | Stop reason: HOSPADM

## 2020-12-15 RX ORDER — FUROSEMIDE 10 MG/ML
INJECTION INTRAMUSCULAR; INTRAVENOUS PRN
Status: DISCONTINUED | OUTPATIENT
Start: 2020-12-15 | End: 2020-12-15

## 2020-12-15 RX ADMIN — SODIUM CHLORIDE, POTASSIUM CHLORIDE, SODIUM LACTATE AND CALCIUM CHLORIDE: 600; 310; 30; 20 INJECTION, SOLUTION INTRAVENOUS at 07:38

## 2020-12-15 RX ADMIN — ACETAMINOPHEN 975 MG: 325 TABLET, FILM COATED ORAL at 10:41

## 2020-12-15 RX ADMIN — BUPIVACAINE HYDROCHLORIDE 30 ML: 2.5 INJECTION, SOLUTION EPIDURAL; INFILTRATION; INTRACAUDAL; PERINEURAL at 08:20

## 2020-12-15 RX ADMIN — Medication 3.5 MG: at 09:24

## 2020-12-15 RX ADMIN — FUROSEMIDE 10 MG: 10 INJECTION, SOLUTION INTRAVENOUS at 09:24

## 2020-12-15 RX ADMIN — SODIUM CHLORIDE 400 ML: 900 IRRIGANT IRRIGATION at 09:23

## 2020-12-15 RX ADMIN — HYDROMORPHONE HYDROCHLORIDE 1 MG: 1 INJECTION, SOLUTION INTRAMUSCULAR; INTRAVENOUS; SUBCUTANEOUS at 07:45

## 2020-12-15 RX ADMIN — GLYCOPYRROLATE 0.6 MG: 0.2 INJECTION, SOLUTION INTRAMUSCULAR; INTRAVENOUS at 09:24

## 2020-12-15 RX ADMIN — OXYCODONE HYDROCHLORIDE 5 MG: 5 TABLET ORAL at 17:08

## 2020-12-15 RX ADMIN — FENTANYL CITRATE 50 MCG: 50 INJECTION, SOLUTION INTRAMUSCULAR; INTRAVENOUS at 08:34

## 2020-12-15 RX ADMIN — FENTANYL CITRATE 50 MCG: 50 INJECTION, SOLUTION INTRAMUSCULAR; INTRAVENOUS at 10:12

## 2020-12-15 RX ADMIN — CEFAZOLIN SODIUM 2 G: 2 INJECTION, SOLUTION INTRAVENOUS at 07:38

## 2020-12-15 RX ADMIN — PROPOFOL 200 MG: 10 INJECTION, EMULSION INTRAVENOUS at 07:45

## 2020-12-15 RX ADMIN — SCOPALAMINE 1 PATCH: 1 PATCH, EXTENDED RELEASE TRANSDERMAL at 07:27

## 2020-12-15 RX ADMIN — MIDAZOLAM 2 MG: 1 INJECTION INTRAMUSCULAR; INTRAVENOUS at 07:38

## 2020-12-15 RX ADMIN — ACETAMINOPHEN 650 MG: 325 TABLET, FILM COATED ORAL at 23:13

## 2020-12-15 RX ADMIN — FENTANYL CITRATE 100 MCG: 50 INJECTION, SOLUTION INTRAMUSCULAR; INTRAVENOUS at 07:45

## 2020-12-15 RX ADMIN — ACETAMINOPHEN 650 MG: 325 TABLET, FILM COATED ORAL at 15:42

## 2020-12-15 RX ADMIN — DEXAMETHASONE SODIUM PHOSPHATE 8 MG: 4 INJECTION, SOLUTION INTRA-ARTICULAR; INTRALESIONAL; INTRAMUSCULAR; INTRAVENOUS; SOFT TISSUE at 07:45

## 2020-12-15 RX ADMIN — WATER 100 ML: 100 IRRIGANT IRRIGATION at 09:36

## 2020-12-15 RX ADMIN — BUPIVACAINE HYDROCHLORIDE AND EPINEPHRINE BITARTRATE 10 ML: 5; .005 INJECTION, SOLUTION EPIDURAL; INTRACAUDAL; PERINEURAL at 09:15

## 2020-12-15 RX ADMIN — LIDOCAINE HYDROCHLORIDE 30 MG: 10 INJECTION, SOLUTION INFILTRATION; PERINEURAL at 07:45

## 2020-12-15 RX ADMIN — PROPOFOL 75 MCG/KG/MIN: 10 INJECTION, EMULSION INTRAVENOUS at 07:45

## 2020-12-15 RX ADMIN — ROCURONIUM BROMIDE 50 MG: 10 INJECTION INTRAVENOUS at 07:45

## 2020-12-15 RX ADMIN — GLYCOPYRROLATE 0.2 MG: 0.2 INJECTION, SOLUTION INTRAMUSCULAR; INTRAVENOUS at 07:45

## 2020-12-15 RX ADMIN — ROCURONIUM BROMIDE 5 MG: 10 INJECTION INTRAVENOUS at 08:59

## 2020-12-15 RX ADMIN — ONDANSETRON HYDROCHLORIDE 4 MG: 2 INJECTION, SOLUTION INTRAVENOUS at 08:44

## 2020-12-15 RX ADMIN — FENTANYL CITRATE 50 MCG: 50 INJECTION, SOLUTION INTRAMUSCULAR; INTRAVENOUS at 08:29

## 2020-12-15 RX ADMIN — KETOROLAC TROMETHAMINE 30 MG: 30 INJECTION, SOLUTION INTRAMUSCULAR at 09:17

## 2020-12-15 RX ADMIN — SODIUM CHLORIDE, POTASSIUM CHLORIDE, SODIUM LACTATE AND CALCIUM CHLORIDE: 600; 310; 30; 20 INJECTION, SOLUTION INTRAVENOUS at 09:07

## 2020-12-15 RX ADMIN — OXYCODONE HYDROCHLORIDE 5 MG: 5 TABLET ORAL at 12:01

## 2020-12-15 RX ADMIN — FENTANYL CITRATE 50 MCG: 50 INJECTION, SOLUTION INTRAMUSCULAR; INTRAVENOUS at 10:48

## 2020-12-15 RX ADMIN — KETOROLAC TROMETHAMINE 30 MG: 30 INJECTION, SOLUTION INTRAMUSCULAR at 21:51

## 2020-12-15 RX ADMIN — KETOROLAC TROMETHAMINE 30 MG: 30 INJECTION, SOLUTION INTRAMUSCULAR at 15:42

## 2020-12-15 SDOH — HEALTH STABILITY: MENTAL HEALTH: CURRENT SMOKER: 0

## 2020-12-15 ASSESSMENT — MIFFLIN-ST. JEOR: SCORE: 1293.65

## 2020-12-15 ASSESSMENT — LIFESTYLE VARIABLES: TOBACCO_USE: 1

## 2020-12-15 NOTE — PLAN OF CARE
Patient doing well, VSS, tolerating regular diet. Will plan to get up and ambulate in room this evening. Pain well managed.

## 2020-12-15 NOTE — PROGRESS NOTES
Preop Diagnosis: persistent ROWENA 3; dysmenorrhea unresponsive to conservative therapy; sterilization candidate    Post-Op Diagnosis: same    Procedure: Exam under anesthesia and Total Laproscopic Hysterectomy, bilateral salpingectomy, cystoscopy    Surgeon: REEMA RODRIGUEZ MD, Assistant: Zonia WILLIAMSON    EBL: 15 cc    Anesthesia: General and Local    Path: uterus, tubes    Complications: no problems reported    Findings: see dictated operative note for full details        REEMA RODRIGUEZ MD  9:45 AM  12/15/2020

## 2020-12-15 NOTE — ANESTHESIA CARE TRANSFER NOTE
Patient: Julisa Tillman    Procedure(s):  TOTAL LAPAROSCOPIC HYSTERECTOMY, BILATERAL SALPINGECTOMY    Diagnosis: Dysmenorrhea [N94.6]  ROWENA III (cervical intraepithelial neoplasia III) [D06.9]  Request for sterilization [Z30.2]  Diagnosis Additional Information: No value filed.    Anesthesia Type:   General     Note:  Airway :Face Mask    Comments: Pt awake,VSS exchanging well.      Vitals: (Last set prior to Anesthesia Care Transfer)    CRNA VITALS  12/15/2020 0912 - 12/15/2020 0949      12/15/2020             Pulse:  117    SpO2:  (!) 85 %    Resp Rate (observed):  (!) 3                Electronically Signed By: KOFI Carver CRNA  December 15, 2020  9:49 AM

## 2020-12-15 NOTE — PLAN OF CARE
Dr Baer called to clarify catheter order. OK to removed if patient is up and ambulating later this afternoon.

## 2020-12-15 NOTE — OP NOTE
Procedure Date: 12/15/2020      PREOPERATIVE DIAGNOSES:  Dysmenorrhea, persistent cervical intraepithelial neoplasia grade 3 unresponsive to conservative therapy, sterilization candidate.      POSTOPERATIVE DIAGNOSES:  Dysmenorrhea, persistent cervical intraepithelial neoplasia grade 3 unresponsive to conservative therapy, sterilization candidate.      PROCEDURES:  Examination under anesthesia, total laparoscopic hysterectomy, bilateral salpingectomy, cystoscopy.      SURGEON:  Tyler Baer M.D.      ASSISTANT:  MD Dr. Zonia Costa's assistance was necessary to help ensure patient's safety and well-being because of the complexity of the surgery.      HISTORY OF PRESENT ILLNESS:  The patient is a 40-year-old white female, para 4-0-0-4, on OCPs for contraception, who has had a history of persistent ROWENA-3.  She had a previous LEEP procedure done and the ectocervical margins were positive for ROWENA.  The post-LEEP endocervical curettings also were positive for a high-grade ROWENA-2.  The patient has also had significant dysmenorrhea unresponsive to medical management.  The patient is adamant that she does not want any more children.  In reviewing with her the risks, benefits and alternative forms of therapy, a decision was made to proceed with a total laparoscopic hysterectomy and bilateral salpingectomy.  I think the patient has a good understanding of the nature of the problem, the nature of the procedure, the risks, the benefits, and the alternatives.  She also has a history of positive for HPV- 16 and understands the need for followup post procedurally.  All the patient's questions have been answered and informed consent has been obtained.      OPERATIVE FINDINGS:   Exam under anesthesia:  External genitalia:  BUS without lesions.  Speculum:  Normal-appearing vaginal epithelium.  Multiparous-appearing cervix.  No lesions seen.  Bimanual exam:  Uterus is parous, mobile, retroflexed.  Adnexa without  masses or enlargement.        Details from the diagnostic laparoscopy:  The left fallopian tube and fimbriated end are within normal limits.  The left ovary is within normal limits.  I did not see any excrescences, adhesions, endometriosis or signs of malignancy.  Left lateral pelvic sidewall is within normal limits.  Ureteral vermiculation was noted.  The left round ligament is normal.  The anterior surface of the uterus and bladder flap are within normal limits.  The right round ligament is within normal limits.  Right fallopian tube and fimbriated end are within normal limits.  The right ovary is within normal limits.  I did not see any excrescences, adhesions or evidence of endometriosis.  The right lateral pelvic sidewall, the posterior aspect of the uterus, the cul-de-sac and the right and left uterosacral ligaments are within normal limits.  The appendix was visualized and was within normal limits.  The liver edge and gallbladder are seen and within normal limits.        Details from the cystoscopy:  Please make note that cystoscopy was done at the completion of the surgery.  We used a 20-Bahraini, 70-degree scope with sterile water as our distending medium.  The urethra was within normal limits.  The trigone of the bladder was within normal limits.  The right and left ureteral orifices were seen.  There was bilateral efflux of urine.  The inferior, medial, lateral, superior, anterior and apical portions of the bladder mucosa were all within normal limits.  I did not see any neovascularization mosaicism compromised to the lumen, foreign bodies within the bladder, or evidence of malignancy.      DETAILS OF PROCEDURE:  After obtaining informed consent, the patient was taken to the operating room where a general anesthetic with endotracheal intubation was undertaken.  Pneumatic stockings had been placed preoperatively.  She was placed in the dorsal lithotomy position in [facility] with legs approximately 30  degrees to the horizontal.  An exam under anesthesia was performed.  Following this, the patient was prepped and draped in the usual sterile fashion.  A hard stop was performed.  A bivalve speculum was placed in the vagina.  The cervix visualized, grasped with a tenaculum, and a figure-of-X suture of 0 Vicryl placed through the anterior cervical lip.  The uterine cavity was sounded to 6 cm.  The endocervical canal was dilated to a #8 Hegar dilator.  We then used a large VCare cup.  We placed the probe into the uterine cavity and inflated the balloon.  The green stay ring was then sutured to the anterior cervical lip with the previously placed suture of 0 Vicryl.  Bimanual exam confirmed that the cervix was then closed within the cup.  The blue stay ring was placed to secure this in place after we had removed the speculum.  A Dodge catheter was placed with return of clear urine.  A glove with 2 Ray-Anjelica sponges was placed in the vagina to aid in maintaining pneumoperitoneum.      The instruments were draped sterilely.  Subumbilical ligaments were then identified, injected with approximately 3-4 mL of 0.5% Marcaine with epinephrine, grasped with Piatt clamps, tented, and a Veress needle placed through the umbilicus with proper position confirmed with free flow of saline technique.  Opening pressure was less than 10 mmHg.  The abdomen was inflated with 3 liters of CO2 gas.  With the abdomen adequately insufflated, a curvilinear infraumbilical incision was made and a 5 mm nonbladed visualizing trocar was placed under direct visualization without difficulty.  A brief inspection of the pelvis was made.  A second 5 mm nonbladed trocar was placed in the right lower quadrant lateral to the rectus abdominis muscles 2 fingerbreadths superior to the iliac crest after infiltrating the skin and subcutaneous tissues with approximately 3-4 mL of 0.5% Marcaine with epinephrine solution.  A third trocar was placed in the left lower  quadrant.  This was a 12 mm nonbladed trocar again placed under direct visualization after anesthetizing the skin and underlying tissues with approximately 3-4 mL of 0.5% Marcaine with epinephrine.  Inspection of the pelvis was made with the above findings.  We then brought the Thunderbeat device into the right lower port and a Prestige forceps to the left port.  The uterus was deflected cephalad and to the patient's left, isolating the right adnexal structures.  The distal end of the right fallopian tube was identified and gentle superior medial traction was placed, isolating the mesosalpinx.  The mesosalpinx was then taken down in successive bites with the Thunderbeat device, the pedicles cauterized and divided under direct visualization.  There was no evidence of any residual fimbria.  The right round ligament was identified, crossclamped, cauterized and divided with the Thunderbeat device.  The right utero-ovarian ligament was identified, crossclamped, cauterized and taken down in successive bites.  The remaining portions of the broad and cardinal ligament were then taken down with the Thunderbeat device, the pedicles cauterized and divided under direct visualization to the level of the uterosacral ligament.  Ureters, bowel and other intraabdominal structures were free of the field of dissection at all times.  The vesicouterine reflection was identified, incised in a right-to-left manner, and the bladder deflected distally with meticulous blunt and sharp dissection.  The posterior peritoneal reflection was identified, incised in a right-to-left manner, and deflected distally.  We could clearly outline the outline of the VCare cup.  Attention was turned to the left adnexa.  The Thunderbeat device was brought onto the left lower port, the Prestige the right port.  We identified the distal end of the left fallopian tube and gentle superior medial traction was applied.  The mesosalpinx was isolated.  The pedicles  were cauterized and divided in successive bites with the Thunderbeat device.  The left round ligament was identified, crossclamped, cauterized and divided.  The left utero-ovarian ligament was identified, crossclamped, cauterized and divided in successive bites.  The remaining portions of the broad and cardinal ligaments were taken down in successive bites, the pedicles cauterized and divided with the Thunderbeat device under direct visualization without difficulty or complication.  Again, intraabdominal and retroperitoneal structures were free of the field of dissection at all times.  With the uterus adequately mobilized and pedicles ligated, the colpotomy incision was performed with the Thunderbeat device in a circumferential manner, and the specimen consisting of the uterus, cervix and fallopian tubes were removed through the vagina without difficulty.  The glove with Ray-Anjelica sponges was again replaced in the vagina to maintain pneumoperitoneum.  The vaginal cuff was closed in the following manner:  I used a suture of 0 V-Loc material and beginning at the right lateral margin, incorporated the anterior fibromuscular layer and vaginal epithelium, the posterior vaginal epithelium, rectovaginal fascia, and uterosacral ligament.  I then secured the suture.  A second 0 V-Loc suture was used beginning at the left lateral margin, again incorporating the anterior fibromuscular layer, the anterior and posterior vaginal epithelium, posterior rectovaginal fascia, and uterosacral ligament.  Ureters were free of the field of dissection at all times.  Using the suture, beginning on the left lateral margin, I used a running stitch to reapproximate the vaginal apex, incorporating the fibromuscular layer, the anterior and posterior vaginal epithelium, the rectovaginal fascia to the level of the right uterosacral ligament.  The right uterosacral ligament was incorporated into the repair and the suture brought back to the midline.   I then used the suture that we had previously placed, beginning on the right lateral margin and placed a second reinforcing layer in a running manner from right to left, incorporating the left uterosacral ligament and brought back to the midline.  This resulted in a hemostatically secure 2-layer closure of the vaginal cuff with resulting DeLancey level 1 level of apical compartment support.  Pedicles were inspected.  The pelvis was irrigated with warm saline.  Pedicles were found to be hemostatic.  Bilateral ureteral vermiculation was noted.  There was no evidence of any injury to bowel or other intraabdominal or retroperitoneal structures.  At this point, we removed the 12 mm trocar from the left lower quadrant and this port was closed using a Armando-Mason closure device with an interrupted suture of 0 Vicryl.  This resulted in a hemostatic airtight closure with no palpable defects.  30 mL of 0.25% Marcaine plain was placed in the pelvis for additional analgesia.  At this point, we paused.  We documented that sponge, needle and instrument counts had all been checked and were correct x 2, that hemostasis was checked and found to be acceptable.  With counts correct and hemostasis acceptable, the abdomen was deflated of CO2 gas.  Trocars were removed under direct visualization.  Skin incisions closed with a subcuticular suture of 4-0 Monocryl and Dermabond.  The Dodge catheter was removed.  Cystoscopy was performed with the above findings.  With a normal cystoscopy in hand, a bimanual examination revealed a sound vaginal cuff closure with DeLancey level 1 level of apical compartment support.  Rectovaginal exam confirmed no evidence of foreign bodies within the rectum with excellent apical support.  At the completion of the procedure with sponge, needle and instrument counts checked and documented to be correct x 2 and with hemostasis checked and found to be acceptable, the patient was taken out of dorsal lithotomy  position, awoken and returned to the recovery area in good condition.        Estimated blood loss approximately 15 mL.  There were no difficulties or complications.         TYLER BAER MD             D: 12/15/2020   T: 12/15/2020   MT: FITO      Name:     EZEKIEL MOSLEY   MRN:      [id number]        Account:        [id number]   :      1980           Procedure Date: 12/15/2020      Document: [id number]       cc: Tyler Baer MD

## 2020-12-15 NOTE — ANESTHESIA PREPROCEDURE EVALUATION
Anesthesia Pre-Procedure Evaluation    Patient: Julisa Tillman   MRN: [id number] : 1980          Preoperative Diagnosis: Dysmenorrhea [N94.6]  ROWENA III (cervical intraepithelial neoplasia III) [D06.9]  Request for sterilization [Z30.2]    Procedure(s):  TOTAL LAPAROSCOPIC HYSTERECTOMY, BILATERAL SALPINGECTOMY    Past Medical History:   Diagnosis Date     Cervical high risk HPV (human papillomavirus) test positive 2018 NIL, +HPV 16     Major depression, recurrent (H) 2016     PONV (postoperative nausea and vomiting)      Smoker 10/13/2016     Suicidal ideation 2018     Past Surgical History:   Procedure Laterality Date     ENT SURGERY      T&A as a child     LAPAROSCOPY DIAGNOSTIC (GYN)       LEEP TX, CERVICAL  2020     Anesthesia Evaluation     . Pt has had prior anesthetic. Type: General    History of anesthetic complications   - PONV        ROS/MED HX    ENT/Pulmonary:     (+)tobacco use, Current use , . .    Neurologic:  - neg neurologic ROS     Cardiovascular:  - neg cardiovascular ROS       METS/Exercise Tolerance:     Hematologic:  - neg hematologic  ROS       Musculoskeletal:  - neg musculoskeletal ROS       GI/Hepatic:  - neg GI/hepatic ROS       Renal/Genitourinary:  - ROS Renal section negative       Endo:  - neg endo ROS       Psychiatric:     (+) psychiatric history anxiety and depression      Infectious Disease:  - neg infectious disease ROS       Malignancy:         Other:    - neg other ROS                      Physical Exam  Normal systems: cardiovascular, pulmonary and dental    Airway   Mallampati: II  TM distance: >3 FB  Neck ROM: full    Dental     Cardiovascular       Pulmonary             Lab Results   Component Value Date    WBC 8.5 2018    HGB 14.4 2020    HCT 42.4 2018     2018     2018    POTASSIUM 4.9 2018    CHLORIDE 108 2018    CO2 29 2018    BUN 9 2018    CR 0.81 2018  "   GLC 89 12/25/2018    ELLE 8.5 12/25/2018    ALBUMIN 3.8 12/25/2018    PROTTOTAL 7.2 12/25/2018    ALT 16 12/25/2018    AST 15 12/25/2018    ALKPHOS 42 12/25/2018    BILITOTAL 0.4 12/25/2018    TSH 2.71 12/25/2018    HCG Negative 06/22/2017       Preop Vitals  BP Readings from Last 3 Encounters:   11/30/20 112/62   11/30/20 112/62   10/12/20 110/70    Pulse Readings from Last 3 Encounters:   11/30/20 72   08/24/20 77   02/04/20 98      Resp Readings from Last 3 Encounters:   06/30/19 16   12/27/18 16   12/24/18 18    SpO2 Readings from Last 3 Encounters:   11/30/20 97%   08/24/20 98%   02/04/20 98%      Temp Readings from Last 1 Encounters:   11/30/20 99.1  F (37.3  C) (Tympanic)    Ht Readings from Last 1 Encounters:   11/30/20 1.702 m (5' 7\")      Wt Readings from Last 1 Encounters:   11/30/20 60.8 kg (134 lb)    Estimated body mass index is 20.99 kg/m  as calculated from the following:    Height as of 11/30/20: 1.702 m (5' 7\").    Weight as of 11/30/20: 60.8 kg (134 lb).       Anesthesia Plan      History & Physical Review  History and physical reviewed and following examination; no interval change.    ASA Status:  2 .    NPO Status:  > 8 hours    Plan for General with Intravenous induction. Maintenance will be Balanced.    PONV prophylaxis:  Ondansetron (or other 5HT-3) and Dexamethasone or Solumedrol    The patient is not a current smoker      Postoperative Care  Postoperative pain management:  IV analgesics, Oral pain medications and Multi-modal analgesia.      Consents  Anesthetic plan, risks, benefits and alternatives discussed with:  Patient..                 Subhash Desai MD                    .  "

## 2020-12-15 NOTE — ANESTHESIA POSTPROCEDURE EVALUATION
Patient: Julisa Tillman    Procedure(s):  TOTAL LAPAROSCOPIC HYSTERECTOMY, BILATERAL SALPINGECTOMY, CYSTOSCOPY    Diagnosis:Dysmenorrhea [N94.6]  ROWENA III (cervical intraepithelial neoplasia III) [D06.9]  Request for sterilization [Z30.2]  Diagnosis Additional Information: No value filed.    Anesthesia Type:  General    Note:  Anesthesia Post Evaluation    Patient location during evaluation: PACU  Patient participation: Able to fully participate in evaluation  Level of consciousness: awake and alert  Pain management: adequate  multimodal analgesia used between 6 hours prior to anesthesia start to PACU dischargeAirway patency: patent  Cardiovascular status: acceptable  Respiratory status: acceptable  two or more mitigation strategies used for obstructive sleep apneaHydration status: acceptable  PONV: none     Anesthetic complications: None          Last vitals:  Vitals:    12/15/20 1230 12/15/20 1330 12/15/20 1435   BP: 126/78 115/69 111/64   Pulse: 90 90    Resp: 20 18 18   Temp: 97.6  F (36.4  C) 97.9  F (36.6  C) 97.8  F (36.6  C)   SpO2: 100% 99% 96%         Electronically Signed By: Subhash Desai MD  December 15, 2020  3:46 PM

## 2020-12-16 VITALS
OXYGEN SATURATION: 100 % | SYSTOLIC BLOOD PRESSURE: 114 MMHG | WEIGHT: 132.5 LBS | RESPIRATION RATE: 20 BRPM | HEIGHT: 67 IN | TEMPERATURE: 98 F | DIASTOLIC BLOOD PRESSURE: 74 MMHG | HEART RATE: 70 BPM | BODY MASS INDEX: 20.8 KG/M2

## 2020-12-16 LAB
COPATH REPORT: NORMAL
CREAT SERPL-MCNC: 0.78 MG/DL (ref 0.52–1.04)
GFR SERPL CREATININE-BSD FRML MDRD: >90 ML/MIN/{1.73_M2}

## 2020-12-16 PROCEDURE — 82565 ASSAY OF CREATININE: CPT | Performed by: OBSTETRICS & GYNECOLOGY

## 2020-12-16 PROCEDURE — 250N000011 HC RX IP 250 OP 636: Performed by: OBSTETRICS & GYNECOLOGY

## 2020-12-16 PROCEDURE — 250N000013 HC RX MED GY IP 250 OP 250 PS 637: Performed by: OBSTETRICS & GYNECOLOGY

## 2020-12-16 PROCEDURE — 36415 COLL VENOUS BLD VENIPUNCTURE: CPT | Performed by: OBSTETRICS & GYNECOLOGY

## 2020-12-16 RX ADMIN — OXYCODONE HYDROCHLORIDE 5 MG: 5 TABLET ORAL at 04:08

## 2020-12-16 RX ADMIN — KETOROLAC TROMETHAMINE 30 MG: 30 INJECTION, SOLUTION INTRAMUSCULAR at 04:08

## 2020-12-16 RX ADMIN — ACETAMINOPHEN 650 MG: 325 TABLET, FILM COATED ORAL at 09:24

## 2020-12-16 RX ADMIN — IBUPROFEN 600 MG: 600 TABLET, FILM COATED ORAL at 10:38

## 2020-12-16 RX ADMIN — OXYCODONE HYDROCHLORIDE 5 MG: 5 TABLET ORAL at 07:40

## 2020-12-16 RX ADMIN — OXYCODONE HYDROCHLORIDE 5 MG: 5 TABLET ORAL at 10:38

## 2020-12-16 NOTE — PLAN OF CARE
A&O x4. VSS, on RA. Up ad gio. LS clear, denies SOB/LEÓN. BS audible/active x4, tolerating PO, denies N/V. Voiding. No vaginal bleeding noted. Incisions open to air, dermabond, CDI. Tylenol, Toradol, and oxycodone for pain control. Meeting expected goals, possible discharge home today.

## 2020-12-16 NOTE — PLAN OF CARE
Vital signs stable on room air. Up independently, pt educated to call if she feels unsafe while ambulating. Dodge catheter removed this evening and pt has voided adequately several times since, voiding spontaneously. Pain adequately controled with toradol, tylenol and oxycodone. Tolerating a regular diet, denies nausea. Denies passing gas at this time. Incisions are well approximated and open to air with scant serosanguinous drainage.

## 2020-12-16 NOTE — DISCHARGE SUMMARY
[facility] Gynecology Post-Op / Progress Note         Assessment and Plan:    Assessment:   Post-operative day #1  TLH/Bilat salpingectomy     Doing well.  Clean wound without signs of infection.  No excessive bleeding  Pain well-controlled.  AM Hgb pending      Plan:   Ambulate  Advance diet as tolerated  Discharge home today  Has script for oxycodone at pharmacy  Follow up 12/22 with Dr. Baer as scheduled           Interval History:   Doing well.  Continues to improve.  Pain is well-controlled.  No fevers.            Significant Problems:      Past Medical History:   Diagnosis Date     Cervical high risk HPV (human papillomavirus) test positive 05/16/2018 5/16/18 NIL, +HPV 16     Major depression, recurrent (H) 12/12/2016     PONV (postoperative nausea and vomiting)      Smoker 10/13/2016     Suicidal ideation 12/24/2018             Review of Systems:    The patient denies any chest pain, shortness of breath, excessive pain, fever, chills, purulent drainage from the wound, nausea or vomiting.          Medications:   All medications related to the patient's surgery have been reviewed          Physical Exam:     All vitals stable  Patient Vitals for the past 12 hrs:   BP Temp Temp src Pulse Resp SpO2   12/16/20 0405 113/66 98.2  F (36.8  C) Oral 73 21 97 %   12/15/20 2309 120/71 98.9  F (37.2  C) Oral 68 17 97 %     Wounds clean and dry with minimal or no drainage.  Surrounding skin with minimal erythema.  Abdomen soft, flat.  BS present          Data:     Hemoglobin   Date Value Ref Range Status   12/14/2020 14.4 11.7 - 15.7 g/dL Final   12/25/2018 14.1 11.7 - 15.7 g/dL Final   10/26/2018 15.2 11.7 - 15.7 g/dL Final   05/16/2018 14.1 11.7 - 15.7 g/dL Final     -    Derrell Brar MD

## 2020-12-16 NOTE — PLAN OF CARE
VSS, patient independent in room, voiding without difficulty. Tolerating regular diet, no nausea, scopolamine patch discontinued, BS present and passing flatus. Incision sites times 3 all, intact, no drainage.Oral analgesia given for pain score 4-5.  CAPNO discontinued,and  patient now fit for discharge to home, has follow up appointment on 12/22  Discharge instructions given, verbalizes understanding, including no sexual activity until has been seen at follow up appointments with DR Baer. Patients ride is coming around 12 noon. All questions answered, ready to leave unit at 11.55, walked out with RN.

## 2020-12-16 NOTE — RESULT ENCOUNTER NOTE
I will further discuss these results with the patient when I see her for her postop follow-up visit in the office  Tyler Baer MD FACOG

## 2020-12-22 ENCOUNTER — OFFICE VISIT (OUTPATIENT)
Dept: OBGYN | Facility: CLINIC | Age: 40
End: 2020-12-22
Payer: COMMERCIAL

## 2020-12-22 VITALS
SYSTOLIC BLOOD PRESSURE: 106 MMHG | HEIGHT: 67 IN | WEIGHT: 140.2 LBS | DIASTOLIC BLOOD PRESSURE: 62 MMHG | BODY MASS INDEX: 22 KG/M2

## 2020-12-22 DIAGNOSIS — Z98.890 POST-OPERATIVE STATE: Primary | ICD-10-CM

## 2020-12-22 PROCEDURE — 99024 POSTOP FOLLOW-UP VISIT: CPT | Performed by: OBSTETRICS & GYNECOLOGY

## 2020-12-22 ASSESSMENT — MIFFLIN-ST. JEOR: SCORE: 1328.57

## 2020-12-22 NOTE — TELEPHONE ENCOUNTER
5/16/18 NIL, +HPV 16. Plan colp  12/14/18 Patient is lost to pap tracking follow-up.  7/16/19 [place] ECC: ROWENA 2-3. Plan LEEP - not completed  9/14/20 HSIL pap, + HR HPV 16 & other HR type. Plan LEEP  09/23/20 Pt notified - she has already planned for LEEP per direction of provider  9/29/20 LEEP Bx: ROWENA 1 & ROWENA 3, margins neg for high grade dysplasia, ECC: ROWENA 2. Plan discuss at follow up visit  10/12/20 Follow up visit. Plan hysterectomy  12/15/20 Hysterectomy, cervix removed - negative for residual dysplasia.  Managing Patients After Hysterectomy Guideline:  After a diagnosis of high-grade histology or cytology, patients may undergo hysterectomy for reasons related or unrelated to their cervical abnormalities. If hysterectomy is  performed for treatment, patients should have 3 consecutive annual HPV-based tests before entering long-term surveillance. Long-term surveillance after treatment for histologic HSIL (ROWENA 2 or ROWENA 3) or AIS involves HPV-based testing at 3-year intervals for 25 years, regardless of whether the patient has had a hysterectomy either for treatment or at any point during the surveillance period (CIII).  12/22/20 Follow up visit scheduled

## 2020-12-22 NOTE — NURSING NOTE
"Chief Complaint   Patient presents with     Surgical Followup     wanting to know when she can go back to exercise and work       Initial /62   Ht 1.702 m (5' 7\")   Wt 63.6 kg (140 lb 3.2 oz)   LMP 2020   BMI 21.96 kg/m   Estimated body mass index is 21.96 kg/m  as calculated from the following:    Height as of this encounter: 1.702 m (5' 7\").    Weight as of this encounter: 63.6 kg (140 lb 3.2 oz).  BP completed using cuff size: regular    Questioned patient about current smoking habits.  Pt. quit smoking some time ago.          The following HM Due: NONE        "

## 2020-12-26 NOTE — PATIENT INSTRUCTIONS
You can reach your [facility] Care Team any time of the day by calling [phone]. This number will put you in touch with the 24 hour nurse line if the clinic is closed.    To contact your OB/GYN Station Coordinator/Surgery Scheduler please call [phone]. This is a direct number for your care team between 8 a.m. and 4 p.m. Monday through Friday.    [facility] is open for your convenience:  Monday through Friday 8 a.m. to 6 p.m.  Closed weekends and all major holidays.

## 2020-12-26 NOTE — PROGRESS NOTES
"The patient is a 40-year-old white female, para 4-0-0-4, on OCPs for contraception, who has had a history of persistent ROWENA-3.  She had a previous LEEP procedure done and the ectocervical margins were positive for ROWENA.  The post-LEEP endocervical curettings also were positive for a high-grade ROWENA-2.  The patient has also had significant dysmenorrhea unresponsive to medical management.  The patient is adamant that she does not want any more children.  In reviewing with her the risks, benefits and alternative forms of therapy, a decision was made to proceed with a total laparoscopic hysterectomy and bilateral salpingectomy.  On 12/15/20 the pt underwent a  Examination under anesthesia, total laparoscopic hysterectomy, bilateral salpingectomy, cystoscopy. The path report showed the following    FINAL DIAGNOSIS:   Uterus, resection:   - Cervix with changes of prior biopsy site and chronic cervicitis.     Negative for residual dysplasia.   Ectocervical margin without abnormalities.   - Inactive endometrium.   - Adenomyosis, focal.   - Serosa without abnormalities.   - Negative for endometrial polyp, neoplastic serous epithelial   proliferations, hyperplasia and malignancy.     Fallopian tube, right, resection:   - Endometriosis with metaplastic changes, focal.   - No evidence of malignancy.     Fallopian tube, left, resection:   - Serous cystadenoma.   - No evidence of malignancy.   She present today for a PO visit.  She is doing well without concerns.    Past Medical History:   Diagnosis Date     Cervical high risk HPV (human papillomavirus) test positive 05/16/2018 5/16/18 NIL, +HPV 16     Major depression, recurrent (H) 12/12/2016     PONV (postoperative nausea and vomiting)      Smoker 10/13/2016     Suicidal ideation 12/24/2018     /62   Ht 1.702 m (5' 7\")   Wt 63.6 kg (140 lb 3.2 oz)   LMP 11/29/2020   BMI 21.96 kg/m      Constitutional: healthy, alert and no distress  Gastrointestinal: Abdomen soft, " non-tender. BS normal. No masses, organomegaly incisions are clean and intact and healed nicely    (Z98.890) Post-operative state  (primary encounter diagnosis)  Comment: Doing well without concerns  Plan: Patient has a postop instructions.  I will see her back 8 weeks postop.  Signs  of concern discussed the patient call

## 2020-12-29 ENCOUNTER — TELEPHONE (OUTPATIENT)
Dept: OBGYN | Facility: CLINIC | Age: 40
End: 2020-12-29

## 2020-12-29 NOTE — TELEPHONE ENCOUNTER
"Pt calling. She has been having an increase in vag bleeding today. Had a TLH on 12/15. Had no bleeding until today.   States that she has been more active in the ast 24 hrs. She is changing a pad every 2-3 hrs.     Pt would prefer to monitor the symptoms at this time, as she doesn't want to come in to the clinic due to COVID.  I did advise the pt that if she starts soaking through a full size pad in an hour or less, she needs to be evaluated in the ED.     Pt did state that it is hard for her to \"take it easy\" as she is a single mom with 4 kids.     Routed to md on-call as an FYI.     Criselda ZAMUDIO RN    "

## 2020-12-30 ENCOUNTER — OFFICE VISIT (OUTPATIENT)
Dept: OBGYN | Facility: CLINIC | Age: 40
End: 2020-12-30
Payer: COMMERCIAL

## 2020-12-30 VITALS — BODY MASS INDEX: 21.88 KG/M2 | SYSTOLIC BLOOD PRESSURE: 104 MMHG | WEIGHT: 139.7 LBS | DIASTOLIC BLOOD PRESSURE: 60 MMHG

## 2020-12-30 DIAGNOSIS — Z09 POSTOP CHECK: Primary | ICD-10-CM

## 2020-12-30 PROCEDURE — 99024 POSTOP FOLLOW-UP VISIT: CPT | Performed by: OBSTETRICS & GYNECOLOGY

## 2020-12-30 NOTE — PROGRESS NOTES
Post Op Follow Up    Julisa Tillman is here for post op follow up visit following TLH/BS on 12/15/20.    Procedure was uncomplicated.  Final pathology demonstrated     FINAL DIAGNOSIS:   Uterus, resection:   - Cervix with changes of prior biopsy site and chronic cervicitis.     Negative for residual dysplasia.   Ectocervical margin without abnormalities.   - Inactive endometrium.   - Adenomyosis, focal.   - Serosa without abnormalities.   - Negative for endometrial polyp, neoplastic serous epithelial   proliferations, hyperplasia and malignancy.     Fallopian tube, right, resection:   - Endometriosis with metaplastic changes, focal.   - No evidence of malignancy.     Fallopian tube, left, resection:   - Serous cystadenoma.   - No evidence of malignancy.    Since surgery patient has had some light bleeding.    Activity, bowel function and bladder function have all returned to normal.    EXAM:  : EGBUS normal    Vaginal cuff well supported.  Scant blood in vault.    Small area in center of repair where inferior edge of epithelium exposed.  Rxed with AgNO3     Follow up with Dr. Baer as scheduled    Jae Brar MD

## 2020-12-30 NOTE — NURSING NOTE
"Chief Complaint   Patient presents with     Surgical Followup     c/o spotting then bleeding started 12/24/20 improving today     initial /60   Wt 63.4 kg (139 lb 11.2 oz)   LMP 11/29/2020   BMI 21.88 kg/m   Estimated body mass index is 21.88 kg/m  as calculated from the following:    Height as of 12/22/20: 1.702 m (5' 7\").    Weight as of this encounter: 63.4 kg (139 lb 11.2 oz).  BP completed using cuff size regular.  Suzie Valles CMA    "

## 2020-12-30 NOTE — TELEPHONE ENCOUNTER
Called pt, she states the bleeding is the same as yesterday like a light period.  She is willing to come in today to be evaluated.    Forwarding to the on call today to review and see when she can be fit in.    Bella Hussein RN

## 2020-12-30 NOTE — TELEPHONE ENCOUNTER
I think her cuff needs to be evaluated in clinic, particularly if she is not abiding lifting and activity restrictions. She should see the on-call sometime this week.     Thanks,  Francy Pack MD

## 2021-01-05 ENCOUNTER — TELEPHONE (OUTPATIENT)
Dept: OBGYN | Facility: CLINIC | Age: 41
End: 2021-01-05

## 2021-01-05 ENCOUNTER — PATIENT OUTREACH (OUTPATIENT)
Dept: OBGYN | Facility: CLINIC | Age: 41
End: 2021-01-05

## 2021-01-05 DIAGNOSIS — D06.9 CIN III WITH SEVERE DYSPLASIA: ICD-10-CM

## 2021-01-05 NOTE — LETTER
January 5, 2021      Julisa Smith  [address] [address]        Dear MsPrabhuPrimo,    We are happy to inform you that your recent Hysterectomy pathology was negative or normal.  It is recommended that you have your next Pap smear and Human Papillomavirus (HPV) test in 1 year. this will be due on or after 12/15/21. You will also need to return to the clinic every year for an annual wellness visit.    If you have additional questions regarding this result, please contact our office and we will be happy to assist you.      Sincerely,    Your [facility] Care Team

## 2021-01-05 NOTE — TELEPHONE ENCOUNTER
12/15/20 Hysterectomy, cervix removed - negative for residual dysplasia. Plan cotest in 1 year due 12/15/21. Result letter sent to the pt.

## 2021-01-05 NOTE — TELEPHONE ENCOUNTER
Yany-- yes lets follow the guidelines for pap followup for this pt.  Please notify her of the follow up plan  Tyler Baer M.D.

## 2021-01-05 NOTE — TELEPHONE ENCOUNTER
01/5/21 Telephone encounter sent to the provider to advise on future screening recommendations   02/09/21 Follow up visit scheduled

## 2021-01-05 NOTE — TELEPHONE ENCOUNTER
Hi Dr. Baer,  Pt had a complete hysterectomy done on 12/15/20 see the guidelines below. Would you now recommend 3 cotest's annually before moving into long term surveillance? Please advise.     12/15/20 Hysterectomy, cervix removed - negative for residual dysplasia.     Managing Patients After Hysterectomy Guideline:  After a diagnosis of high-grade histology or cytology, patients may undergo hysterectomy for reasons related or unrelated to their cervical abnormalities. If hysterectomy is  performed for treatment, patients should have 3 consecutive annual HPV-based tests before entering long-term surveillance. Long-term surveillance after treatment for histologic HSIL (ROWENA 2 or ROWENA 3) or AIS involves HPV-based testing at 3-year intervals for 25 years, regardless of whether the patient has had a hysterectomy either for treatment or at any point during the surveillance period (CIII).

## 2021-01-07 ENCOUNTER — TELEPHONE (OUTPATIENT)
Dept: OBGYN | Facility: CLINIC | Age: 41
End: 2021-01-07

## 2021-01-11 ENCOUNTER — TELEPHONE (OUTPATIENT)
Dept: OBGYN | Facility: CLINIC | Age: 41
End: 2021-01-11

## 2021-01-11 NOTE — TELEPHONE ENCOUNTER
Form partially completed and given to Lino for Dr. Baer review and signature.   Clarissa Fisher, CMA

## 2021-01-11 NOTE — TELEPHONE ENCOUNTER
Form received from: [employer]    Form requesting following info/need: STD    VILMA needed?: No    Location of form: Jossy's desk    When completed the route for return: Fax [phone]

## 2021-01-14 NOTE — TELEPHONE ENCOUNTER
Workability form return to work date changed to 1/12/21 and refaxed to number on [facility]: [phone].

## 2021-01-25 ENCOUNTER — TELEPHONE (OUTPATIENT)
Dept: OBGYN | Facility: CLINIC | Age: 41
End: 2021-01-25

## 2021-01-25 NOTE — TELEPHONE ENCOUNTER
"Udpated Workability Form  Patient accidentally clicked \"Section C\" instead of \"Section A\" on first form.    Fax to number on form when done.   "

## 2021-01-26 NOTE — TELEPHONE ENCOUNTER
Forms partially completed and placed on [facility] computer for Dr. Baer to finish and sign.   Clarissa Fisher CMA

## 2021-01-31 ENCOUNTER — DOCUMENTATION ONLY (OUTPATIENT)
Dept: HEALTH INFORMATION MANAGEMENT | Facility: CLINIC | Age: 41
End: 2021-01-31

## 2021-02-09 ENCOUNTER — OFFICE VISIT (OUTPATIENT)
Dept: OBGYN | Facility: CLINIC | Age: 41
End: 2021-02-09
Payer: COMMERCIAL

## 2021-02-09 VITALS — SYSTOLIC BLOOD PRESSURE: 114 MMHG | DIASTOLIC BLOOD PRESSURE: 70 MMHG | WEIGHT: 133 LBS | BODY MASS INDEX: 20.83 KG/M2

## 2021-02-09 DIAGNOSIS — Z98.890 POSTOPERATIVE STATE: Primary | ICD-10-CM

## 2021-02-09 PROCEDURE — 99024 POSTOP FOLLOW-UP VISIT: CPT | Performed by: OBSTETRICS & GYNECOLOGY

## 2021-02-09 NOTE — NURSING NOTE
"Chief Complaint   Patient presents with     Surgical Followup     12/15       Initial /70   Wt 60.3 kg (133 lb)   LMP 2020   BMI 20.83 kg/m   Estimated body mass index is 20.83 kg/m  as calculated from the following:    Height as of 20: 1.702 m (5' 7\").    Weight as of this encounter: 60.3 kg (133 lb).  BP completed using cuff size: regular    Questioned patient about current smoking habits.  Pt. quit smoking some time ago.          The following HM Due: NONE      The following patient reported/Care Every where data was sent to:  P ABSTRACT QUALITY INITIATIVES [[id number]]        Kaylee Jensen [facility]                "

## 2021-02-09 NOTE — PATIENT INSTRUCTIONS
You can reach your [place] Care Team any time of the day by calling [phone]. This number will put you in touch with the 24 hour nurse line if the clinic is closed.    To contact your OB/GYN Station Coordinator/Surgery Scheduler please call [phone]. This is a direct number for your care team between 8 a.m. and 4 p.m. Monday through Friday.    [facility] is open for your convenience:  Monday through Friday 8 a.m. to 6 p.m.  Closed weekends and all major holidays.

## 2021-02-09 NOTE — PROGRESS NOTES
The patient is a 40-year-old white female, para 4-0-0-4, on OCPs for contraception, who has had a history of persistent ROWENA-3.  She had a previous LEEP procedure done and the ectocervical margins were positive for ROWENA.  The post-LEEP endocervical curettings also were positive for a high-grade ROWENA-2.  The patient has also had significant dysmenorrhea unresponsive to medical management.  The patient is adamant that she does not want any more children.  In reviewing with her the risks, benefits and alternative forms of therapy, a decision was made to proceed with a total laparoscopic hysterectomy and bilateral salpingectomy.  This was done on 12/15/2020.  The final pathology report revealed the following    FINAL DIAGNOSIS:   Uterus, resection:   - Cervix with changes of prior biopsy site and chronic cervicitis.     Negative for residual dysplasia.   Ectocervical margin without abnormalities.   - Inactive endometrium.   - Adenomyosis, focal.   - Serosa without abnormalities.   - Negative for endometrial polyp, neoplastic serous epithelial   proliferations, hyperplasia and malignancy.     Fallopian tube, right, resection:   - Endometriosis with metaplastic changes, focal.   - No evidence of malignancy.     Fallopian tube, left, resection:   - Serous cystadenoma.   - No evidence of malignancy.    The patient presents today for a final postop visit.  She is doing well without concerns with normal bowel bladder function and no pain or discomfort    Past Medical History:   Diagnosis Date     Cervical high risk HPV (human papillomavirus) test positive 05/16/2018 5/16/18 NIL, +HPV 16     Major depression, recurrent (H) 12/12/2016     PONV (postoperative nausea and vomiting)      Smoker 10/13/2016     Suicidal ideation 12/24/2018     Current Outpatient Medications   Medication     ARIPiprazole (ABILIFY) 2 MG tablet     cetirizine (ZYRTEC) 10 MG tablet     escitalopram (LEXAPRO) 20 MG tablet     fluticasone (FLONASE) 50  MCG/ACT spray     traZODone (DESYREL) 50 MG tablet     No current facility-administered medications for this visit.      /70   Wt 60.3 kg (133 lb)   LMP 11/29/2020   BMI 20.83 kg/m    Constitutional: healthy, alert and no distress  Gastrointestinal: Abdomen soft, non-tender. BS normal. No masses, organomegaly incisions are clean and intact and healing nicely  Genitourinary: Normal external genitalia without lesions and speculum exam excellent apical anterior and posterior compartment support.  The cuff is well-healed.  Absent cervix.  Bimanual exam uterus is absent adnexa without masses enlargement or tenderness    (Z98.890) Postoperative state  (primary encounter diagnosis)  Comment: Doing well.  Patient can resume a normal lifestyle  Plan: We will see her back for routine annual exams or as needed concerns should they arise in the interval

## 2021-12-01 ENCOUNTER — PATIENT OUTREACH (OUTPATIENT)
Dept: OBGYN | Facility: CLINIC | Age: 41
End: 2021-12-01
Payer: COMMERCIAL

## 2021-12-01 DIAGNOSIS — D06.9 CIN III WITH SEVERE DYSPLASIA: ICD-10-CM

## 2022-01-28 NOTE — TELEPHONE ENCOUNTER
FYI to provider - Patient is lost to pap tracking follow-up. Attempts to contact pt have been made per reminder process and there has been no reply and/or no appt scheduled.

## 2022-02-01 ENCOUNTER — MYC MEDICAL ADVICE (OUTPATIENT)
Dept: FAMILY MEDICINE | Facility: CLINIC | Age: 42
End: 2022-02-01
Payer: COMMERCIAL

## 2022-02-14 ASSESSMENT — ENCOUNTER SYMPTOMS
JOINT SWELLING: 0
MYALGIAS: 0
HEMATOCHEZIA: 0
ARTHRALGIAS: 0
HEMATURIA: 0
CONSTIPATION: 0
ABDOMINAL PAIN: 0
BREAST MASS: 0
DYSURIA: 0
FEVER: 0
PALPITATIONS: 0
SHORTNESS OF BREATH: 0
DIARRHEA: 0
SORE THROAT: 0
HEARTBURN: 0
CHILLS: 0
EYE PAIN: 0
NAUSEA: 0
COUGH: 1
HEADACHES: 0
FREQUENCY: 0
WEAKNESS: 0
DIZZINESS: 0
PARESTHESIAS: 0
NERVOUS/ANXIOUS: 0

## 2022-02-17 ENCOUNTER — OFFICE VISIT (OUTPATIENT)
Dept: FAMILY MEDICINE | Facility: CLINIC | Age: 42
End: 2022-02-17
Payer: COMMERCIAL

## 2022-02-17 VITALS
OXYGEN SATURATION: 97 % | DIASTOLIC BLOOD PRESSURE: 72 MMHG | TEMPERATURE: 97.4 F | SYSTOLIC BLOOD PRESSURE: 108 MMHG | RESPIRATION RATE: 16 BRPM | BODY MASS INDEX: 22.36 KG/M2 | HEIGHT: 67 IN | WEIGHT: 142.44 LBS | HEART RATE: 107 BPM

## 2022-02-17 DIAGNOSIS — D06.9 CIN III WITH SEVERE DYSPLASIA: ICD-10-CM

## 2022-02-17 DIAGNOSIS — Z12.4 CERVICAL CANCER SCREENING: ICD-10-CM

## 2022-02-17 DIAGNOSIS — Z12.31 ENCOUNTER FOR SCREENING MAMMOGRAM FOR BREAST CANCER: ICD-10-CM

## 2022-02-17 DIAGNOSIS — F41.9 ANXIETY: ICD-10-CM

## 2022-02-17 DIAGNOSIS — Z00.00 ROUTINE GENERAL MEDICAL EXAMINATION AT A HEALTH CARE FACILITY: Primary | ICD-10-CM

## 2022-02-17 DIAGNOSIS — L65.9 HAIR THINNING: ICD-10-CM

## 2022-02-17 DIAGNOSIS — F50.9 EATING DISORDER, UNSPECIFIED TYPE: ICD-10-CM

## 2022-02-17 DIAGNOSIS — Z90.710 S/P TAH (TOTAL ABDOMINAL HYSTERECTOMY): ICD-10-CM

## 2022-02-17 DIAGNOSIS — F33.42 RECURRENT MAJOR DEPRESSION IN COMPLETE REMISSION (H): ICD-10-CM

## 2022-02-17 DIAGNOSIS — Z13.220 LIPID SCREENING: ICD-10-CM

## 2022-02-17 DIAGNOSIS — Z13.1 SCREENING FOR DIABETES MELLITUS: ICD-10-CM

## 2022-02-17 PROCEDURE — 99396 PREV VISIT EST AGE 40-64: CPT | Performed by: PHYSICIAN ASSISTANT

## 2022-02-17 PROCEDURE — 99214 OFFICE O/P EST MOD 30 MIN: CPT | Mod: 25 | Performed by: PHYSICIAN ASSISTANT

## 2022-02-17 PROCEDURE — 80061 LIPID PANEL: CPT | Performed by: PHYSICIAN ASSISTANT

## 2022-02-17 PROCEDURE — G0145 SCR C/V CYTO,THINLAYER,RESCR: HCPCS | Performed by: PHYSICIAN ASSISTANT

## 2022-02-17 PROCEDURE — 84443 ASSAY THYROID STIM HORMONE: CPT | Performed by: PHYSICIAN ASSISTANT

## 2022-02-17 PROCEDURE — 36415 COLL VENOUS BLD VENIPUNCTURE: CPT | Performed by: PHYSICIAN ASSISTANT

## 2022-02-17 PROCEDURE — 87624 HPV HI-RISK TYP POOLED RSLT: CPT | Performed by: PHYSICIAN ASSISTANT

## 2022-02-17 PROCEDURE — 80053 COMPREHEN METABOLIC PANEL: CPT | Performed by: PHYSICIAN ASSISTANT

## 2022-02-17 ASSESSMENT — ENCOUNTER SYMPTOMS
EYE PAIN: 0
NAUSEA: 0
DIZZINESS: 0
SORE THROAT: 0
DYSURIA: 0
NERVOUS/ANXIOUS: 0
COUGH: 1
JOINT SWELLING: 0
SHORTNESS OF BREATH: 0
WEAKNESS: 0
FREQUENCY: 0
CONSTIPATION: 0
CHILLS: 0
PARESTHESIAS: 0
PALPITATIONS: 0
HEMATURIA: 0
HEMATOCHEZIA: 0
MYALGIAS: 0
DIARRHEA: 0
HEARTBURN: 0
FEVER: 0
ARTHRALGIAS: 0
HEADACHES: 0
ABDOMINAL PAIN: 0
BREAST MASS: 0

## 2022-02-17 ASSESSMENT — PATIENT HEALTH QUESTIONNAIRE - PHQ9
SUM OF ALL RESPONSES TO PHQ QUESTIONS 1-9: 3
5. POOR APPETITE OR OVEREATING: NOT AT ALL

## 2022-02-17 ASSESSMENT — ANXIETY QUESTIONNAIRES
2. NOT BEING ABLE TO STOP OR CONTROL WORRYING: SEVERAL DAYS
5. BEING SO RESTLESS THAT IT IS HARD TO SIT STILL: NOT AT ALL
1. FEELING NERVOUS, ANXIOUS, OR ON EDGE: SEVERAL DAYS
3. WORRYING TOO MUCH ABOUT DIFFERENT THINGS: SEVERAL DAYS
6. BECOMING EASILY ANNOYED OR IRRITABLE: NOT AT ALL
GAD7 TOTAL SCORE: 4
IF YOU CHECKED OFF ANY PROBLEMS ON THIS QUESTIONNAIRE, HOW DIFFICULT HAVE THESE PROBLEMS MADE IT FOR YOU TO DO YOUR WORK, TAKE CARE OF THINGS AT HOME, OR GET ALONG WITH OTHER PEOPLE: NOT DIFFICULT AT ALL
7. FEELING AFRAID AS IF SOMETHING AWFUL MIGHT HAPPEN: SEVERAL DAYS
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
## 2022-02-18 PROBLEM — F50.9 EATING DISORDER, UNSPECIFIED TYPE: Status: ACTIVE | Noted: 2019-06-11

## 2022-02-18 PROBLEM — N94.6 DYSMENORRHEA: Status: RESOLVED | Noted: 2020-10-13 | Resolved: 2022-02-18

## 2022-02-18 LAB
ALBUMIN SERPL-MCNC: 4.1 G/DL (ref 3.4–5)
ALP SERPL-CCNC: 47 U/L (ref 40–150)
ALT SERPL W P-5'-P-CCNC: 22 U/L (ref 0–50)
ANION GAP SERPL CALCULATED.3IONS-SCNC: 8 MMOL/L (ref 3–14)
AST SERPL W P-5'-P-CCNC: 21 U/L (ref 0–45)
BILIRUB SERPL-MCNC: 0.4 MG/DL (ref 0.2–1.3)
BUN SERPL-MCNC: 10 MG/DL (ref 7–30)
CALCIUM SERPL-MCNC: 8.5 MG/DL (ref 8.5–10.1)
CHLORIDE BLD-SCNC: 107 MMOL/L (ref 94–109)
CHOLEST SERPL-MCNC: 216 MG/DL
CO2 SERPL-SCNC: 22 MMOL/L (ref 20–32)
CREAT SERPL-MCNC: 0.79 MG/DL (ref 0.52–1.04)
FASTING STATUS PATIENT QL REPORTED: YES
GFR SERPL CREATININE-BSD FRML MDRD: >90 ML/MIN/1.73M2
GLUCOSE BLD-MCNC: 85 MG/DL (ref 70–99)
HDLC SERPL-MCNC: 67 MG/DL
LDLC SERPL CALC-MCNC: 134 MG/DL
NONHDLC SERPL-MCNC: 149 MG/DL
POTASSIUM BLD-SCNC: 3.8 MMOL/L (ref 3.4–5.3)
PROT SERPL-MCNC: 7.2 G/DL (ref 6.8–8.8)
SODIUM SERPL-SCNC: 137 MMOL/L (ref 133–144)
TRIGL SERPL-MCNC: 74 MG/DL
TSH SERPL DL<=0.005 MIU/L-ACNC: 2 MU/L (ref 0.4–4)

## 2022-02-18 ASSESSMENT — ANXIETY QUESTIONNAIRES: GAD7 TOTAL SCORE: 4

## 2022-02-21 LAB
BKR LAB AP GYN ADEQUACY: NORMAL
BKR LAB AP GYN INTERPRETATION: NORMAL
BKR LAB AP HPV REFLEX: NORMAL
BKR LAB AP LMP: NORMAL
BKR LAB AP PREVIOUS ABNL DX: NORMAL
BKR LAB AP PREVIOUS ABNORMAL: NORMAL
PATH REPORT.COMMENTS IMP SPEC: NORMAL
PATH REPORT.COMMENTS IMP SPEC: NORMAL
PATH REPORT.RELEVANT HX SPEC: NORMAL

## 2022-02-23 LAB
HUMAN PAPILLOMA VIRUS 16 DNA: NEGATIVE
HUMAN PAPILLOMA VIRUS 18 DNA: NEGATIVE
HUMAN PAPILLOMA VIRUS FINAL DIAGNOSIS: NORMAL
HUMAN PAPILLOMA VIRUS OTHER HR: NEGATIVE

## 2022-02-24 ENCOUNTER — PATIENT OUTREACH (OUTPATIENT)
Dept: FAMILY MEDICINE | Facility: CLINIC | Age: 42
End: 2022-02-24
Payer: COMMERCIAL

## 2022-02-25 NOTE — RESULT ENCOUNTER NOTE
Dear Julisa,      Your recent test results are noted below:    -LDL(bad) cholesterol level is elevated which can increase your heart disease risk. A diet high in fat and simple carbohydrates, genetics and being overweight can contribute to this. ADVISE: exercising 150 minutes of aerobic exercise per week (30 minutes for 5 days per week or 50 minutes for 3 days per week are options) and eating a low saturated fat/low carbohydrate diet are helpful to improve this.  -Liver and gallbladder tests are normal (ALT,AST, Alk phos, bilirubin), kidney function is normal (Cr, GFR), sodium is normal, potassium is normal, calcium is normal, glucose is normal.  -TSH (thyroid stimulating hormone) level is normal which indicates normal thyroid function.    For additional lab test information, labtestsonline.org is an excellent reference. Please contact the clinic at [phone] with any further questions or concerns.    Sincerely,      Carmen Pham PA-C  [facility]

## 2022-02-28 ENCOUNTER — MYC MEDICAL ADVICE (OUTPATIENT)
Dept: FAMILY MEDICINE | Facility: CLINIC | Age: 42
End: 2022-02-28
Payer: COMMERCIAL

## 2022-02-28 DIAGNOSIS — J01.90 ACUTE SINUSITIS WITH SYMPTOMS > 10 DAYS: Primary | ICD-10-CM

## 2022-03-01 NOTE — TELEPHONE ENCOUNTER
Please see my chart message below     Please review and advise     Thank you     Manjula Castañeda RN, BSN  [facility]

## 2022-03-03 RX ORDER — AMOXICILLIN 875 MG
875 TABLET ORAL 2 TIMES DAILY
Qty: 20 TABLET | Refills: 0 | Status: SHIPPED | OUTPATIENT
Start: 2022-03-03 | End: 2022-03-13

## 2022-09-11 ENCOUNTER — HEALTH MAINTENANCE LETTER (OUTPATIENT)
Age: 42
End: 2022-09-11

## 2022-10-06 ENCOUNTER — OFFICE VISIT (OUTPATIENT)
Dept: OPTOMETRY | Facility: CLINIC | Age: 42
End: 2022-10-06
Payer: COMMERCIAL

## 2022-10-06 DIAGNOSIS — H40.1331 PIGMENTARY GLAUCOMA OF BOTH EYES, MILD STAGE: ICD-10-CM

## 2022-10-06 DIAGNOSIS — H52.12 MYOPIA OF LEFT EYE WITH ASTIGMATISM: ICD-10-CM

## 2022-10-06 DIAGNOSIS — H21.231: Primary | ICD-10-CM

## 2022-10-06 DIAGNOSIS — H52.4 PRESBYOPIA: ICD-10-CM

## 2022-10-06 DIAGNOSIS — H10.10 SEASONAL ALLERGIC CONJUNCTIVITIS: ICD-10-CM

## 2022-10-06 DIAGNOSIS — H52.202 MYOPIA OF LEFT EYE WITH ASTIGMATISM: ICD-10-CM

## 2022-10-06 PROCEDURE — 92004 COMPRE OPH EXAM NEW PT 1/>: CPT | Performed by: OPTOMETRIST

## 2022-10-06 PROCEDURE — 92015 DETERMINE REFRACTIVE STATE: CPT | Performed by: OPTOMETRIST

## 2022-10-06 RX ORDER — LATANOPROST 50 UG/ML
1 SOLUTION/ DROPS OPHTHALMIC AT BEDTIME
Qty: 2.5 ML | Refills: 1 | Status: SHIPPED | OUTPATIENT
Start: 2022-10-06 | End: 2023-10-06

## 2022-10-06 ASSESSMENT — REFRACTION_MANIFEST
OD_ADD: +1.25
OS_SPHERE: -1.25
OD_SPHERE: PLANO
OS_CYLINDER: +0.50
OS_AXIS: 040
METHOD_AUTOREFRACTION: 1
OS_CYLINDER: +0.25
OS_AXIS: 41
OD_SPHERE: PLANO
OS_SPHERE: -1.50
OS_ADD: +1.25
OD_CYLINDER: SPHERE

## 2022-10-06 ASSESSMENT — VISUAL ACUITY
OD_SC: 20/20
OS_PH_SC: 20/25
OS_SC: 20/40
OD_SC: 20/60
OS_SC: 20/30
METHOD: SNELLEN - LINEAR

## 2022-10-06 ASSESSMENT — EXTERNAL EXAM - LEFT EYE: OS_EXAM: NORMAL

## 2022-10-06 ASSESSMENT — TONOMETRY
IOP_METHOD: APPLANATION
OD_IOP_MMHG: 30
OS_IOP_MMHG: 30

## 2022-10-06 ASSESSMENT — CONF VISUAL FIELD
OD_NORMAL: 1
METHOD: COUNTING FINGERS
OS_NORMAL: 1

## 2022-10-06 ASSESSMENT — EXTERNAL EXAM - RIGHT EYE: OD_EXAM: NORMAL

## 2022-10-06 ASSESSMENT — CUP TO DISC RATIO
OS_RATIO: 0.2
OD_RATIO: 0.2

## 2022-10-06 ASSESSMENT — SLIT LAMP EXAM - LIDS
COMMENTS: LASH EXTENSIONS
COMMENTS: LASH EXTENSIONS

## 2022-10-06 NOTE — PROGRESS NOTES
Chief Complaint   Patient presents with     Annual Eye Exam        Last Eye Exam: 5 years ago  Dilated Previously: yes, side effects of dilation explained today    What are you currently using to see?  Glasses - has a pair of glasses but rarely wears them - did not bring in       Distance Vision Acuity: Satisfied with vision    Near Vision Acuity: Not satisfied     Eye Comfort: good  Do you use eye drops? : No  Spring and fall allergies uses visfritz Rowland - Optometric Assistant           Medical, surgical and family histories reviewed and updated 10/6/2022.       OBJECTIVE: See Ophthalmology exam    ASSESSMENT:    ICD-10-CM    1. Pigment dispersion syndrome of iris, right  H21.231    2. 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 Myopia of left eye with astigmatism  H52.12     H52.202    4. Presbyopia  H52.4    5. Seasonal allergic conjunctivitis  H10.10        PLAN:   Refer to [facility] for glaucoma evaluation/ surgical consult  Start Latanoprost at bedtime each eye   Discussed spec options  Allergy drops as needed for seasonal allergic conjunctivitis     Radha Laws OD

## 2022-10-06 NOTE — PATIENT INSTRUCTIONS
Using over the counter Zaditor or Alaway- 1 drop in both eyes 2 x day or Pataday once daily   along with cold compresses and frequent eye/ brow washing will help for itchy, watery eyes.   Using continuously for 2 weeks will have better efficacy    You may also use chilled artificial tears during the day two times daily      No visine      You have pigment dispersion syndrome which is causing glaucoma  In order to prevent vision loss, I am starting you on a drop in each eye at bedtime and am sending you for a glaucoma consult to       [facility]  [address] [facility] [address]  Phone:[phone]

## 2022-10-06 NOTE — LETTER
10/6/2022         RE: Julisa Tillman  [address]  [place] [address]        Dear Colleague,    Thank you for referring your patient, Julisa Tillman, to the [facility]. Please see a copy of my visit note below.    Chief Complaint   Patient presents with     Annual Eye Exam        Last Eye Exam: 5 years ago  Dilated Previously: yes, side effects of dilation explained today    What are you currently using to see?  Glasses - has a pair of glasses but rarely wears them - did not bring in       Distance Vision Acuity: Satisfied with vision    Near Vision Acuity: Not satisfied     Eye Comfort: good  Do you use eye drops? : No  Spring and fall allergies uses yariel Rowland - Optometric Assistant           Medical, surgical and family histories reviewed and updated 10/6/2022.       OBJECTIVE: See Ophthalmology exam    ASSESSMENT:    ICD-10-CM    1. Pigment dispersion syndrome of iris, right  H21.231    2. 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 Myopia of left eye with astigmatism  H52.12     H52.202    4. Presbyopia  H52.4    5. Seasonal allergic conjunctivitis  H10.10        PLAN:   Refer to [facility] for glaucoma evaluation/ surgical consult  Start Latanoprost at bedtime each eye   Discussed spec options  Allergy drops as needed for seasonal allergic conjunctivitis     Radha Laws OD         Again, thank you for allowing me to participate in the care of your patient.        Sincerely,        Radah Laws, OD

## 2022-12-13 ENCOUNTER — ALLIED HEALTH/NURSE VISIT (OUTPATIENT)
Dept: FAMILY MEDICINE | Facility: CLINIC | Age: 42
End: 2022-12-13
Payer: COMMERCIAL

## 2022-12-13 DIAGNOSIS — Z23 NEED FOR PROPHYLACTIC VACCINATION AND INOCULATION AGAINST INFLUENZA: Primary | ICD-10-CM

## 2022-12-13 PROCEDURE — 99207 PR NO CHARGE NURSE ONLY: CPT

## 2022-12-13 PROCEDURE — 90471 IMMUNIZATION ADMIN: CPT

## 2022-12-13 PROCEDURE — 90686 IIV4 VACC NO PRSV 0.5 ML IM: CPT

## 2023-02-02 ENCOUNTER — PATIENT OUTREACH (OUTPATIENT)
Dept: FAMILY MEDICINE | Facility: CLINIC | Age: 43
End: 2023-02-02
Payer: COMMERCIAL

## 2023-02-02 DIAGNOSIS — D06.9 CIN III WITH SEVERE DYSPLASIA: ICD-10-CM

## 2023-02-02 NOTE — LETTER
February 2, 2023      Julisa Tillman  [address] [facility] [place] [address]        Dear ,    This letter is to remind you that you are due for your follow-up Pap smear and Human Papillomavirus (HPV) test.    Please call [phone] to schedule your appointment at your earliest convenience.    If you have completed the appointment outside of the [facility] system, please have the records forwarded to our office. We will update your chart for your provider to review before your next annual wellness visit.     Thank you for choosing [facility]!      Sincerely,    Your [facility] Care Team

## 2023-02-06 ENCOUNTER — NURSE TRIAGE (OUTPATIENT)
Dept: FAMILY MEDICINE | Facility: CLINIC | Age: 43
End: 2023-02-06
Payer: COMMERCIAL

## 2023-02-06 NOTE — TELEPHONE ENCOUNTER
RN COVID TREATMENT VISIT  02/06/23    Julisa Tillman  42 year old  Current weight? 145 lb    Has the patient been seen by a primary care provider at an [facility] or [facility] within the past two years? Yes.   Have you been in close proximity to/do you have a known exposure to a person with a confirmed case of influenza? Yes. Patient informed a virtual visit with a provider will be required for treatment to determine if COVID or influenza medications are best. Patient will be transferred to a  at the end of this call.   Marylou Zhang RN      Patient with positive home Covid 19 test today, onset of symptoms 2/4/23.  States she did have exposure 1/31/23 to a person who was later diagnosed with influenza.  This nurse advised patient she needs a virtual visit with a provider but she states she doesn't want to do a virtual visit and decides she needs no treatment for Covid and doesn't want to pursue diagnosis or treatment for influenza. ROBB Zhang R.N.

## 2023-03-06 ENCOUNTER — OFFICE VISIT (OUTPATIENT)
Dept: FAMILY MEDICINE | Facility: CLINIC | Age: 43
End: 2023-03-06
Payer: COMMERCIAL

## 2023-03-06 VITALS
HEART RATE: 111 BPM | RESPIRATION RATE: 15 BRPM | BODY MASS INDEX: 22.82 KG/M2 | TEMPERATURE: 99 F | SYSTOLIC BLOOD PRESSURE: 127 MMHG | HEIGHT: 67 IN | WEIGHT: 145.4 LBS | OXYGEN SATURATION: 97 % | DIASTOLIC BLOOD PRESSURE: 88 MMHG

## 2023-03-06 DIAGNOSIS — Z12.11 SCREEN FOR COLON CANCER: ICD-10-CM

## 2023-03-06 DIAGNOSIS — G89.29 CHRONIC PAIN OF RIGHT KNEE: ICD-10-CM

## 2023-03-06 DIAGNOSIS — Z12.4 CERVICAL CANCER SCREENING: ICD-10-CM

## 2023-03-06 DIAGNOSIS — Z00.00 ROUTINE GENERAL MEDICAL EXAMINATION AT A HEALTH CARE FACILITY: Primary | ICD-10-CM

## 2023-03-06 DIAGNOSIS — F33.42 RECURRENT MAJOR DEPRESSION IN COMPLETE REMISSION (H): ICD-10-CM

## 2023-03-06 DIAGNOSIS — M25.561 CHRONIC PAIN OF RIGHT KNEE: ICD-10-CM

## 2023-03-06 PROCEDURE — G0145 SCR C/V CYTO,THINLAYER,RESCR: HCPCS | Performed by: NURSE PRACTITIONER

## 2023-03-06 PROCEDURE — 99396 PREV VISIT EST AGE 40-64: CPT | Performed by: NURSE PRACTITIONER

## 2023-03-06 PROCEDURE — G0124 SCREEN C/V THIN LAYER BY MD: HCPCS

## 2023-03-06 PROCEDURE — 87624 HPV HI-RISK TYP POOLED RSLT: CPT | Performed by: NURSE PRACTITIONER

## 2023-03-06 RX ORDER — DEXTROAMPHETAMINE SACCHARATE, AMPHETAMINE ASPARTATE, DEXTROAMPHETAMINE SULFATE AND AMPHETAMINE SULFATE 3.75; 3.75; 3.75; 3.75 MG/1; MG/1; MG/1; MG/1
15 TABLET ORAL 2 TIMES DAILY PRN
COMMUNITY
Start: 2023-02-03

## 2023-03-06 ASSESSMENT — PATIENT HEALTH QUESTIONNAIRE - PHQ9
SUM OF ALL RESPONSES TO PHQ QUESTIONS 1-9: 0
10. IF YOU CHECKED OFF ANY PROBLEMS, HOW DIFFICULT HAVE THESE PROBLEMS MADE IT FOR YOU TO DO YOUR WORK, TAKE CARE OF THINGS AT HOME, OR GET ALONG WITH OTHER PEOPLE: NOT DIFFICULT AT ALL
SUM OF ALL RESPONSES TO PHQ QUESTIONS 1-9: 0

## 2023-03-06 ASSESSMENT — ENCOUNTER SYMPTOMS
HEARTBURN: 0
SORE THROAT: 0
PARESTHESIAS: 0
COUGH: 0
CONSTIPATION: 0
HEMATOCHEZIA: 0
FREQUENCY: 0
HEMATURIA: 0
BREAST MASS: 0
DYSURIA: 0
NERVOUS/ANXIOUS: 0
SHORTNESS OF BREATH: 0
DIZZINESS: 0
WEAKNESS: 0
CHILLS: 0
PALPITATIONS: 0
EYE PAIN: 0
ABDOMINAL PAIN: 0
JOINT SWELLING: 0
ARTHRALGIAS: 1
HEADACHES: 0
FEVER: 0
NAUSEA: 0
MYALGIAS: 0
DIARRHEA: 0

## 2023-03-06 NOTE — PROGRESS NOTES
SUBJECTIVE:   CC: Julisa is an 43 year old who presents for preventive health visit.     Patient has been advised of split billing requirements and indicates understanding: Yes  Healthy Habits:     Getting at least 3 servings of Calcium per day:  Yes    Bi-annual eye exam:  Yes    Dental care twice a year:  Yes    Sleep apnea or symptoms of sleep apnea:  None    Diet:  Regular (no restrictions)    Frequency of exercise:  4-5 days/week    Duration of exercise:  30-45 minutes    Taking medications regularly:  Yes    Medication side effects:  None    PHQ-2 Total Score: 0    Additional concerns today:  No    Depression and Anxiety Follow-Up    How are you doing with your depression since your last visit? Improved sees psychiatry    How are you doing with your anxiety since your last visit?  Improved     Are you having other symptoms that might be associated with depression or anxiety? No    Have you had a significant life event? No     Do you have any concerns with your use of alcohol or other drugs? No    Social History     Tobacco Use     Smoking status: Former     Packs/day: 0.25     Years: 20.00     Pack years: 5.00     Types: Cigarettes     Quit date: 2018     Years since quittin.6     Smokeless tobacco: Never   Substance Use Topics     Alcohol use: No     Drug use: No     PHQ 2020   PHQ-9 Total Score 0 2 3   Q9: Thoughts of better off dead/self-harm past 2 weeks Not at all Not at all Not at all     ADRIANA-7 SCORE 2020   Total Score 17 0 4     Last PHQ-9 3/6/2023   1.  Little interest or pleasure in doing things 0   2.  Feeling down, depressed, or hopeless 0   3.  Trouble falling or staying asleep, or sleeping too much 0   4.  Feeling tired or having little energy 0   5.  Poor appetite or overeating 0   6.  Feeling bad about yourself 0   7.  Trouble concentrating 0   8.  Moving slowly or restless 0   Q9: Thoughts of better off dead/self-harm past 2  weeks 0   PHQ-9 Total Score 0   Difficulty at work, home, or with people -     ADRIANA-7  2022   1. Feeling nervous, anxious, or on edge 1   2. Not being able to stop or control worrying 1   3. Worrying too much about different things 1   4. Trouble relaxing 0   5. Being so restless that it is hard to sit still 0   6. Becoming easily annoyed or irritable 0   7. Feeling afraid, as if something awful might happen 1   ADRIANA-7 Total Score 4   If you checked any problems, how difficult have they made it for you to do your work, take care of things at home, or get along with other people? Not difficult at all       Suicide Assessment Five-step Evaluation and Treatment (SAFE-T)      Today's PHQ-2 Score:   PHQ-2 (  [employer]) 2022   Q1: Little interest or pleasure in doing things 0   Q2: Feeling down, depressed or hopeless 0   PHQ-2 Score 0   Q1: Little interest or pleasure in doing things Not at all   Q2: Feeling down, depressed or hopeless Not at all   PHQ-2 Score 0           Social History     Tobacco Use     Smoking status: Former     Packs/day: 0.25     Years: 20.00     Pack years: 5.00     Types: Cigarettes     Quit date: 2018     Years since quittin.6     Smokeless tobacco: Never   Substance Use Topics     Alcohol use: No         Alcohol Use 2022   Prescreen: >3 drinks/day or >7 drinks/week? Not Applicable       Reviewed orders with patient.  Reviewed health maintenance and updated orders accordingly - Yes  Lab work is in process    Breast Cancer Screening:  Any new diagnosis of family breast, ovarian, or bowel cancer? No    FHS-7:   Breast CA Risk Assessment (FHS-7) 2022   Did any of your first-degree relatives have breast or ovarian cancer? No   Did any of your relatives have bilateral breast cancer? No   Did any man in your family have breast cancer? No   Did any woman in your family have breast and ovarian cancer? No   Did any woman in your family have breast cancer before age 50 y? Yes    Do you have 2 or more relatives with breast and/or ovarian cancer? No   Do you have 2 or more relatives with breast and/or bowel cancer? No       Mammogram Screening - Offered annual screening and updated Health Maintenance for [place] plan based on risk factor consideration    Pertinent mammograms are reviewed under the imaging tab.    History of abnormal Pap smear: Status post hysterectomy. Pap still indicated. Needs x2 vaginal pap then can discontinue if normal. Today's pap is 2nd check.  PAP / HPV Latest Ref Rng & Units 2/17/2022 9/14/2020 5/16/2018   PAP   Negative for Intraepithelial Lesion or Malignancy (NILM) - -   PAP (Historical) - - HSIL(A) NIL   HPV16 Negative Negative Positive(A) Positive(A)   HPV18 Negative Negative Negative Negative   HRHPV Negative Negative Positive(A) Negative     Reviewed and updated as needed this visit by clinical staff                  Reviewed and updated as needed this visit by Provider                 Past Medical History:   Diagnosis Date     Cervical high risk HPV (human papillomavirus) test positive 05/16/2018 5/16/18 NIL, +HPV 16     Depressive disorder 1994     Dysmenorrhea 10/13/2020    Added automatically from request for surgery [id number]     Major depression, recurrent (H) 12/12/2016     PONV (postoperative nausea and vomiting)      Smoker 10/13/2016     Suicidal ideation 12/24/2018      Past Surgical History:   Procedure Laterality Date     ENT SURGERY      T&A as a child     LAPAROSCOPIC HYSTERECTOMY TOTAL, SALPINGECTOMY BILATERAL Bilateral 12/15/2020    Procedure: TOTAL LAPAROSCOPIC HYSTERECTOMY, BILATERAL SALPINGECTOMY, CYSTOSCOPY;  Surgeon: Tyler Baer MD;  Location: [facility] OR     LAPAROSCOPY DIAGNOSTIC (GYN)  2002     LEEP TX, CERVICAL  09/29/2020       Review of Systems   Constitutional: Negative for chills and fever.   HENT: Negative for congestion, ear pain, hearing loss and sore throat.    Eyes: Negative for pain and visual disturbance.   Respiratory:  Negative for cough and shortness of breath.    Cardiovascular: Negative for chest pain, palpitations and peripheral edema.   Gastrointestinal: Negative for abdominal pain, constipation, diarrhea, heartburn, hematochezia and nausea.   Breasts:  Negative for tenderness, breast mass and discharge.   Genitourinary: Negative for dysuria, frequency, genital sores, hematuria, pelvic pain, urgency, vaginal bleeding and vaginal discharge.   Musculoskeletal: Positive for arthralgias. Negative for joint swelling and myalgias.   Skin: Negative for rash.   Neurological: Negative for dizziness, weakness, headaches and paresthesias.   Psychiatric/Behavioral: Negative for mood changes. The patient is not nervous/anxious.        OBJECTIVE:   LMP 11/29/2020 (Exact Date)   Physical Exam  GENERAL: healthy, alert and no distress  EYES: Eyes grossly normal to inspection, PERRL and conjunctivae and sclerae normal  HENT: ear canals and TM's normal, nose and mouth without ulcers or lesions  NECK: no adenopathy, no asymmetry, masses, or scars and thyroid normal to palpation  RESP: lungs clear to auscultation - no rales, rhonchi or wheezes  BREAST: normal without masses, tenderness or nipple discharge and no palpable axillary masses or adenopathy  CV: regular rate and rhythm, normal S1 S2, no S3 or S4, no murmur, click or rub, no peripheral edema and peripheral pulses strong  ABDOMEN: soft, nontender, no hepatosplenomegaly, no masses and bowel sounds normal   (female): normal female external genitalia, normal urethral meatus, vaginal mucosa pink, moist, well rugated  MS: no gross musculoskeletal defects noted, no edema  SKIN: no suspicious lesions or rashes  NEURO: Normal strength and tone, mentation intact and speech normal  PSYCH: mentation appears normal, affect normal/bright    Diagnostic Test Results:  Labs reviewed in Epic    ASSESSMENT/PLAN:   Julisa was seen today for physical.    Diagnoses and all orders for this  visit:    Routine general medical examination at a health care facility  -     REVIEW OF HEALTH MAINTENANCE PROTOCOL ORDERS    Screen for colon cancer  -     Colonoscopy Screening  Referral; Future    Cervical cancer screening  -     Pap Screen with HPV - recommended age 30 - 65 years    Recurrent major depression in complete remission (H)    Chronic pain of right knee  -     Orthopedic  Referral; Future        Patient has been advised of split billing requirements and indicates understanding: Yes      COUNSELING:  Reviewed preventive health counseling, as reflected in patient instructions        She reports that she quit smoking about 4 years ago. She has a 5.00 pack-year smoking history. She has never used smokeless tobacco.      Elizabeth Donaldson, CNP  [facility] [place]

## 2023-03-10 LAB
BKR LAB AP GYN ADEQUACY: ABNORMAL
BKR LAB AP GYN INTERPRETATION: ABNORMAL
BKR LAB AP HPV REFLEX: ABNORMAL
BKR LAB AP PREVIOUS ABNL DX: ABNORMAL
BKR LAB AP PREVIOUS ABNORMAL: ABNORMAL
PATH REPORT.COMMENTS IMP SPEC: ABNORMAL
PATH REPORT.COMMENTS IMP SPEC: ABNORMAL
PATH REPORT.RELEVANT HX SPEC: ABNORMAL

## 2023-03-13 ENCOUNTER — TELEPHONE (OUTPATIENT)
Dept: FAMILY MEDICINE | Facility: CLINIC | Age: 43
End: 2023-03-13
Payer: COMMERCIAL

## 2023-03-13 NOTE — TELEPHONE ENCOUNTER
Patient calls asking for results and interpretation of her recent lab result from 3-6-2023     [facility] call [phone]

## 2023-03-14 ENCOUNTER — TELEPHONE (OUTPATIENT)
Dept: FAMILY MEDICINE | Facility: CLINIC | Age: 43
End: 2023-03-14
Payer: COMMERCIAL

## 2023-03-14 ENCOUNTER — PATIENT OUTREACH (OUTPATIENT)
Dept: FAMILY MEDICINE | Facility: CLINIC | Age: 43
End: 2023-03-14
Payer: COMMERCIAL

## 2023-03-14 NOTE — TELEPHONE ENCOUNTER
Only results waiting are PAP, message sent to PAP team to reach out today with result.    Elizabeth Donaldson, CNP

## 2023-03-14 NOTE — TELEPHONE ENCOUNTER
Recommend co-test for pap in 1 year. Please call patient today with these results. I don't think this came to my inbasket for some reason.    Elizabeth Donaldson, CNP

## 2023-03-23 ENCOUNTER — OFFICE VISIT (OUTPATIENT)
Dept: ORTHOPEDICS | Facility: CLINIC | Age: 43
End: 2023-03-23
Payer: COMMERCIAL

## 2023-03-23 ENCOUNTER — ANCILLARY PROCEDURE (OUTPATIENT)
Dept: GENERAL RADIOLOGY | Facility: CLINIC | Age: 43
End: 2023-03-23
Attending: STUDENT IN AN ORGANIZED HEALTH CARE EDUCATION/TRAINING PROGRAM
Payer: COMMERCIAL

## 2023-03-23 VITALS — HEIGHT: 67 IN | BODY MASS INDEX: 22.76 KG/M2 | WEIGHT: 145 LBS

## 2023-03-23 DIAGNOSIS — G89.29 CHRONIC PAIN OF RIGHT KNEE: ICD-10-CM

## 2023-03-23 DIAGNOSIS — M25.561 CHRONIC PAIN OF RIGHT KNEE: ICD-10-CM

## 2023-03-23 DIAGNOSIS — M22.40 CHONDROMALACIA OF PATELLOFEMORAL JOINT, UNSPECIFIED LATERALITY: Primary | ICD-10-CM

## 2023-03-23 PROCEDURE — 73562 X-RAY EXAM OF KNEE 3: CPT | Mod: TC | Performed by: RADIOLOGY

## 2023-03-23 PROCEDURE — 99204 OFFICE O/P NEW MOD 45 MIN: CPT | Performed by: STUDENT IN AN ORGANIZED HEALTH CARE EDUCATION/TRAINING PROGRAM

## 2023-03-23 NOTE — LETTER
3/23/2023         RE: Julisa Tillman  [address]  [place] [address]        Dear Colleague,    Thank you for referring your patient, Julisa Tillman, to the [facility] [place]. Please see a copy of my visit note below.    ASSESSMENT & PLAN    1. Chondromalacia of patellofemoral joint, bilateral knees    2. Chronic pain of right knee      Julisa Tillman is a 43 year old female presenting for evaluation of acute on chronic right>left anterior knee pain.  History, exam and imaging findings were reviewed today, consistent with patellofemoral chondromalacia (cartilage wear under the kneecap) and overload. X-ray does show a thin osseous fragment adjacent to the proximal lateral tibia on the AP view which could suggest Segond fracture, although cruciate ligament testing appears stable without pain on palpation or with stress testing today. Reviewed treatment options inclusive of pain control, activity modification, bracing and formal physical therapy. Also reviewed timing of advance imaging (ie MRI) or injections.     At this time, will proceed with the following plan    - Activity modifications: Avoid activities that provoke pain including deep knee flexion (squats, lunges) and high impact activity.  - Referral placed for formal physical therapy. Please call [phone] to schedule. Recommend Gaby Villalobos, Mihai Maurice or Lilibeth Bhatt. Exercises to include quadriceps/hamstring/calf stretching/strengthening with range of motion exercises, manual therapy, hip mobilizations, and gait/balance training with use of modalities as needed with home exercise prescription.  - Diclofenac 50 mg tabs prescribed. Take 1 tab with food every 12 hours (breakfast and dinner) for the next 7-14 days, then reduce to as-needed. This is a prescription-strength non-steroidal anti-inflammatory (NSAID) medication. Do not use any other NSAIDS (ie ibuprofen products) while taking this medication. Stop this  medication if you have stomach upset.  - You may also take Tylenol 1000 mg up to 3 times per day as needed for pain.    - Ice the knee(s) for 10-15 minutes 3-4 times per day as needed for flares.   - Compression (patellofemoral knee sleeve) can be helpful for comfort.     Please schedule a follow up appointment to see me in 6-8 weeks, or sooner as needed for persistence or worsening of pain. We can consider an injection or an MRI after 6 weeks of PT if symptoms are not improving. You may call our direct clinic number ([phone]) at any time with questions or concerns.    Yael Muhammad MD, [facility]      -----    SUBJECTIVE  Julias Tillman is a/an 43 year old female who is seen in consultation at the request of  Elizabeth Donaldson C.N.P. for evaluation of right knee pain. The patient is seen by themselves.    Onset: 2 years(s) ago with pain worsening ~ 2 months ago after starting a new weight training class.  Location of Pain: right anterior knee  Rating of Pain at worst: 8/10  Rating of Pain Currently: 3/10  Worsened by: walking, squats, stairs (down worse than up)  Better with: rest / activity avoidance  Treatments tried: rest/activity avoidance, ice, ibuprofen and knee sleeve  Associated symptoms: mild swelling, creaking and catching; no locking or giving out  Orthopedic history: NO  Relevant surgical history: NO  Social history:  works at [employer], enjoys working out / being active     Past Medical History:   Diagnosis Date     Cervical high risk HPV (human papillomavirus) test positive 05/16/2018 5/16/18 NIL, +HPV 16     Depressive disorder 1994     Dysmenorrhea 10/13/2020    Added automatically from request for surgery [id number]     Major depression, recurrent (H) 12/12/2016     PONV (postoperative nausea and vomiting)      Smoker 10/13/2016     Suicidal ideation 12/24/2018     Social History     Socioeconomic History     Marital status:   "  Occupational History     Occupation: [employer]   Tobacco Use     Smoking status: Former     Packs/day: 0.25     Years: 20.00     Pack years: 5.00     Types: Cigarettes     Quit date: 2018     Years since quittin.7     Smokeless tobacco: Never   Vaping Use     Vaping Use: Never used   Substance and Sexual Activity     Alcohol use: No     Drug use: No     Sexual activity: Yes     Partners: Male     Birth control/protection: Male Surgical         Patient's past medical, surgical, social, and family histories were reviewed today and no changes are noted.    REVIEW OF SYSTEMS:  10 point ROS is negative other than symptoms noted above in HPI, Past Medical History or as stated below  Constitutional: NEGATIVE for fever, chills, change in weight  Skin: NEGATIVE for worrisome rashes, moles or lesions  GI/: NEGATIVE for bowel or bladder changes  Neuro: NEGATIVE for weakness, dizziness or paresthesias    OBJECTIVE:  Ht 1.695 m (5' 6.75\")   Wt 65.8 kg (145 lb)   LMP 2020 (Exact Date)   BMI 22.88 kg/m     General: healthy, alert and in no distress  HEENT: no scleral icterus or conjunctival erythema  Skin: no suspicious lesions or rash. No jaundice.  CV: no pedal edema  Resp: normal respiratory effort without conversational dyspnea   Psych: normal mood and affect  Gait: normal steady gait with appropriate coordination and balance  Neuro: Normal light sensory exam of lower extremity  MSK:  BILATERAL KNEE  Inspection:    Genu valgum alignment  Palpation:    Tender about the lateral patellar facet and medial patellar facet. Remainder of bony and ligamentous landmarks are nontender.    Trace effusion is present    Patellofemoral crepitus is Present  Range of Motion:     00 extension to 1250 flexion  Strength:    Quadriceps 5/5 and hamstrings 5/5    Extensor mechanism intact    Dynamic knee valgus and PF crepitus with single and double leg squats  Special Tests:    Positive: Patellar grind, patellar compression, " patellar inhibition, anterior knee pain with Thessaly's    Negative: J sign, patellar apprehension, MCL/valgus stress (0 & 30 deg), LCL/varus stress (0 & 30 deg), Lachman's, anterior drawer, posterior drawer, posterior sag, Robles's    Independent visualization of the below image:  Recent Results (from the past 24 hour(s))   XR Knee Standing AP [place] Bilat Lat Right    Narrative    XR KNEE STANDING AP [place] BILAT LAT RIGHT   3/23/2023 10:00 AM     HISTORY: Chronic pain of right knee; Chronic pain of right knee  COMPARISON: None.         IMPRESSION:     RIGHT KNEE: Joint spaces are preserved. There is a joint effusion.  There is a thin osseous fragment projecting adjacent to the proximal  lateral tibia on the AP view suggestive of an age-indeterminate Segond  fracture. This finding can be seen with cruciate ligament injury.  Findings would be better evaluated with MRI.    LEFT KNEE: Frontal and sunrise views of the left knee demonstrate  preserved joint spaces.    MD Yael DAVID MD, [facility]        Again, thank you for allowing me to participate in the care of your patient.        Sincerely,        Yael Muhammad MD

## 2023-03-23 NOTE — PATIENT INSTRUCTIONS
1. Chondromalacia of patellofemoral joint, bilateral knees    2. Chronic pain of right knee      Julisa Tillman is a 43 year old female presenting for evaluation of acute on chronic right>left anterior knee pain.  History, exam and imaging findings were reviewed today, consistent with patellofemoral chondromalacia (cartilage wear under the kneecap) and overload. Remainder of ligamentous structures appears stable with no pain on palpation or with stressing. Reviewed treatment options inclusive of pain control, activity modification, bracing and formal physical therapy. Also reviewed timing of advance imaging (ie MRI) or injections.     At this time, will proceed with the following plan    - Activity modifications: Avoid activities that provoke pain including deep knee flexion (squats, lunges) and high impact activity.  - Referral placed for formal physical therapy. Please call [phone] to schedule. Recommend Gaby Villalobos, Mihai Maurice or Lilibeth Bhatt. Exercises to include quadriceps/hamstring/calf stretching/strengthening with range of motion exercises, manual therapy, hip mobilizations, and gait/balance training with use of modalities as needed with home exercise prescription.  - Diclofenac 50 mg tabs prescribed. Take 1 tab with food every 12 hours (breakfast and dinner) for the next 7-14 days, then reduce to as-needed. This is a prescription-strength non-steroidal anti-inflammatory (NSAID) medication. Do not use any other NSAIDS (ie ibuprofen products) while taking this medication. Stop this medication if you have stomach upset.  - You may also take Tylenol 1000 mg up to 3 times per day as needed for pain.    - Ice the knee(s) for 10-15 minutes 3-4 times per day as needed for flares.   - Compression (patellofemoral knee sleeve) can be helpful for comfort.     Please schedule a follow up appointment to see me in 6-8 weeks, or sooner as needed for persistence or worsening of pain. We can consider an injection or an  MRI after 6 weeks of PT if symptoms are not improving. You may call our direct clinic number ([phone]) at any time with questions or concerns.    Yael Muhammad MD, CAM  [facility] [place]

## 2023-03-23 NOTE — PROGRESS NOTES
ASSESSMENT & PLAN    1. Chondromalacia of patellofemoral joint, bilateral knees    2. Chronic pain of right knee      Julisa Tillman is a 43 year old female presenting for evaluation of acute on chronic right>left anterior knee pain.  History, exam and imaging findings were reviewed today, consistent with patellofemoral chondromalacia (cartilage wear under the kneecap) and overload. X-ray does show a thin osseous fragment adjacent to the proximal lateral tibia on the AP view which could suggest Segond fracture, although cruciate ligament testing appears stable without pain on palpation or with stress testing today. Reviewed treatment options inclusive of pain control, activity modification, bracing and formal physical therapy. Also reviewed timing of advance imaging (ie MRI) or injections.     At this time, will proceed with the following plan    - Activity modifications: Avoid activities that provoke pain including deep knee flexion (squats, lunges) and high impact activity.  - Referral placed for formal physical therapy. Please call [phone] to schedule. Recommend Gaby Villalobos, Mihai Maurice or Lilibeth Bhatt. Exercises to include quadriceps/hamstring/calf stretching/strengthening with range of motion exercises, manual therapy, hip mobilizations, and gait/balance training with use of modalities as needed with home exercise prescription.  - Diclofenac 50 mg tabs prescribed. Take 1 tab with food every 12 hours (breakfast and dinner) for the next 7-14 days, then reduce to as-needed. This is a prescription-strength non-steroidal anti-inflammatory (NSAID) medication. Do not use any other NSAIDS (ie ibuprofen products) while taking this medication. Stop this medication if you have stomach upset.  - You may also take Tylenol 1000 mg up to 3 times per day as needed for pain.    - Ice the knee(s) for 10-15 minutes 3-4 times per day as needed for flares.   - Compression (patellofemoral knee sleeve) can be helpful for comfort.      Please schedule a follow up appointment to see me in 6-8 weeks, or sooner as needed for persistence or worsening of pain. We can consider an injection or an MRI after 6 weeks of PT if symptoms are not improving. You may call our direct clinic number ([phone]) at any time with questions or concerns.    Yael Muhammad MD, [facility]      -----    SUBJECTIVE  Julisa Tillman is a/an 43 year old female who is seen in consultation at the request of  Elizabeth Donaldson C.N.P. for evaluation of right knee pain. The patient is seen by themselves.    Onset: 2 years(s) ago with pain worsening ~ 2 months ago after starting a new weight training class.  Location of Pain: right anterior knee  Rating of Pain at worst: 8/10  Rating of Pain Currently: 3/10  Worsened by: walking, squats, stairs (down worse than up)  Better with: rest / activity avoidance  Treatments tried: rest/activity avoidance, ice, ibuprofen and knee sleeve  Associated symptoms: mild swelling, creaking and catching; no locking or giving out  Orthopedic history: NO  Relevant surgical history: NO  Social history:  works at [employer], enjoys working out / being active     Past Medical History:   Diagnosis Date     Cervical high risk HPV (human papillomavirus) test positive 2018 NIL, +HPV 16     Depressive disorder 1994     Dysmenorrhea 10/13/2020    Added automatically from request for surgery [id number]     Major depression, recurrent (H) 2016     PONV (postoperative nausea and vomiting)      Smoker 10/13/2016     Suicidal ideation 2018     Social History     Socioeconomic History     Marital status:    Occupational History     Occupation: [employer]   Tobacco Use     Smoking status: Former     Packs/day: 0.25     Years: 20.00     Pack years: 5.00     Types: Cigarettes     Quit date: 2018     Years since quittin.7     Smokeless tobacco: Never   Vaping Use     Vaping  "Use: Never used   Substance and Sexual Activity     Alcohol use: No     Drug use: No     Sexual activity: Yes     Partners: Male     Birth control/protection: Male Surgical         Patient's past medical, surgical, social, and family histories were reviewed today and no changes are noted.    REVIEW OF SYSTEMS:  10 point ROS is negative other than symptoms noted above in HPI, Past Medical History or as stated below  Constitutional: NEGATIVE for fever, chills, change in weight  Skin: NEGATIVE for worrisome rashes, moles or lesions  GI/: NEGATIVE for bowel or bladder changes  Neuro: NEGATIVE for weakness, dizziness or paresthesias    OBJECTIVE:  Ht 1.695 m (5' 6.75\")   Wt 65.8 kg (145 lb)   LMP 11/29/2020 (Exact Date)   BMI 22.88 kg/m     General: healthy, alert and in no distress  HEENT: no scleral icterus or conjunctival erythema  Skin: no suspicious lesions or rash. No jaundice.  CV: no pedal edema  Resp: normal respiratory effort without conversational dyspnea   Psych: normal mood and affect  Gait: normal steady gait with appropriate coordination and balance  Neuro: Normal light sensory exam of lower extremity  MSK:  BILATERAL KNEE  Inspection:    Genu valgum alignment  Palpation:    Tender about the lateral patellar facet and medial patellar facet. Remainder of bony and ligamentous landmarks are nontender.    Trace effusion is present    Patellofemoral crepitus is Present  Range of Motion:     00 extension to 1250 flexion  Strength:    Quadriceps 5/5 and hamstrings 5/5    Extensor mechanism intact    Dynamic knee valgus and PF crepitus with single and double leg squats  Special Tests:    Positive: Patellar grind, patellar compression, patellar inhibition, anterior knee pain with Thessaly's    Negative: J sign, patellar apprehension, MCL/valgus stress (0 & 30 deg), LCL/varus stress (0 & 30 deg), Lachman's, anterior drawer, posterior drawer, posterior sag, Robles's    Independent visualization of the below " image:  Recent Results (from the past 24 hour(s))   XR Knee Standing AP Dennard Bilat Lat Right    Narrative    XR KNEE STANDING AP SUNRISE BILAT LAT RIGHT   3/23/2023 10:00 AM     HISTORY: Chronic pain of right knee; Chronic pain of right knee  COMPARISON: None.         IMPRESSION:     RIGHT KNEE: Joint spaces are preserved. There is a joint effusion.  There is a thin osseous fragment projecting adjacent to the proximal  lateral tibia on the AP view suggestive of an age-indeterminate Segond  fracture. This finding can be seen with cruciate ligament injury.  Findings would be better evaluated with MRI.    LEFT KNEE: Frontal and sunrise views of the left knee demonstrate  preserved joint spaces.    MD Yael DAVID MD, [facility]

## 2023-03-28 ENCOUNTER — MYC MEDICAL ADVICE (OUTPATIENT)
Dept: ORTHOPEDICS | Facility: CLINIC | Age: 43
End: 2023-03-28
Payer: COMMERCIAL

## 2023-03-28 DIAGNOSIS — S83.511A RUPTURE OF ANTERIOR CRUCIATE LIGAMENT OF RIGHT KNEE, INITIAL ENCOUNTER: ICD-10-CM

## 2023-03-28 DIAGNOSIS — G89.29 CHRONIC PAIN OF RIGHT KNEE: Primary | ICD-10-CM

## 2023-03-28 DIAGNOSIS — M22.40 CHONDROMALACIA OF PATELLOFEMORAL JOINT, UNSPECIFIED LATERALITY: ICD-10-CM

## 2023-03-28 DIAGNOSIS — M25.561 CHRONIC PAIN OF RIGHT KNEE: Primary | ICD-10-CM

## 2023-03-29 NOTE — TELEPHONE ENCOUNTER
Patient was last seen on 3/23/23 and had xray right knee at that time. Plan per LOV included: activity modification, PT, Diclofenac 50mg, Tylenol, ice, compression. Patient was instructed to f/u in 6-8 weeks or sooner if needed. Consider injection or MRI after 6 weeks of PT if symptoms are not improving.    Please see patient's mychart message regarding xray results and advise.    Mariah Johnson MBA, ATC

## 2023-04-04 ENCOUNTER — HOSPITAL ENCOUNTER (OUTPATIENT)
Dept: MRI IMAGING | Facility: CLINIC | Age: 43
Discharge: HOME | End: 2023-04-04
Attending: STUDENT IN AN ORGANIZED HEALTH CARE EDUCATION/TRAINING PROGRAM | Admitting: STUDENT IN AN ORGANIZED HEALTH CARE EDUCATION/TRAINING PROGRAM
Payer: COMMERCIAL

## 2023-04-04 DIAGNOSIS — G89.29 CHRONIC PAIN OF RIGHT KNEE: ICD-10-CM

## 2023-04-04 DIAGNOSIS — M25.561 CHRONIC PAIN OF RIGHT KNEE: ICD-10-CM

## 2023-04-04 DIAGNOSIS — M22.40 CHONDROMALACIA OF PATELLOFEMORAL JOINT, UNSPECIFIED LATERALITY: ICD-10-CM

## 2023-04-04 DIAGNOSIS — S83.511A RUPTURE OF ANTERIOR CRUCIATE LIGAMENT OF RIGHT KNEE, INITIAL ENCOUNTER: ICD-10-CM

## 2023-04-04 PROCEDURE — 73721 MRI JNT OF LWR EXTRE W/O DYE: CPT | Mod: RT

## 2023-04-07 ENCOUNTER — VIRTUAL VISIT (OUTPATIENT)
Dept: ORTHOPEDICS | Facility: CLINIC | Age: 43
End: 2023-04-07
Payer: COMMERCIAL

## 2023-04-07 DIAGNOSIS — M23.203 DEGENERATIVE TEAR OF MEDIAL MENISCUS OF RIGHT KNEE: ICD-10-CM

## 2023-04-07 DIAGNOSIS — M25.561 CHRONIC PAIN OF RIGHT KNEE: Primary | ICD-10-CM

## 2023-04-07 DIAGNOSIS — G89.29 CHRONIC PAIN OF RIGHT KNEE: Primary | ICD-10-CM

## 2023-04-07 DIAGNOSIS — M22.40 CHONDROMALACIA OF PATELLOFEMORAL JOINT, UNSPECIFIED LATERALITY: ICD-10-CM

## 2023-04-07 PROCEDURE — 99213 OFFICE O/P EST LOW 20 MIN: CPT | Mod: 93 | Performed by: STUDENT IN AN ORGANIZED HEALTH CARE EDUCATION/TRAINING PROGRAM

## 2023-04-07 NOTE — PATIENT INSTRUCTIONS
1. Chronic pain of right knee    2. Chondromalacia of patellofemoral joint, unspecified laterality    3. Degenerative tear of medial meniscus of right knee      Julisa Tillman is a 43 year old female presenting for follow up of acute on chronic right>left anterior knee pain. Recent MRI of the more symptomatic right knee shows mild to moderate patellofemoral chondromalacia as expected, as well as a small effusion and chronic-appearing degenerative tearing of the anterior horn and body of the lateral meniscus with an underlying cartilage fissure. We reviewed treatment options inclusive of pain control, activity modification, bracing, formal physical therapy, injections or surgical referral.    At this time, will proceed with the following plan    - Activity modifications: Avoid activities that provoke pain including deep knee flexion (squats, lunges) and high impact activity.  - Referral placed at last visit for formal physical therapy. Please call [phone] to schedule with Gaby Villalobos, Mihai Maurice or Lilibeth Bhatt.   - NSAIDS as needed for pain.   - Tylenol 1000 mg up to 3 times per day as needed for pain.    - Ice the knee(s) for 10-15 minutes 3-4 times per day as needed for flares.   - Compression can be helpful for comfort.     Follow up after 6 weeks of physical therapy if symptoms are not improving, at which time may consider injections (cortisone, hyaluronic acid or platelet rich plasma). You may call our direct clinic number ([phone]) at any time with questions or concerns.    Yael Muhammad MD, [facility]

## 2023-04-07 NOTE — LETTER
4/7/2023         RE: Julisa Tillman  [address]  [place] [address]        Dear Colleague,    Thank you for referring your patient, Julisa Tillman, to the [facility] [place]. Please see a copy of my visit note below.    Julisa is a 43 year old who is being evaluated via a billable telephone visit.      What phone number would you like to be contacted at? [phone]  How would you like to obtain your AVS? MyChart    Distant Location (provider location):  On-site  Phone call duration: 15 minutes    ASSESSMENT & PLAN  Patient Instructions     1. Chronic pain of right knee    2. Chondromalacia of patellofemoral joint, unspecified laterality    3. Degenerative tear of medial meniscus of right knee      Julisa Tillman is a 43 year old female presenting for follow up of acute on chronic right>left anterior knee pain. Recent MRI of the more symptomatic right knee shows mild to moderate patellofemoral chondromalacia as expected, as well as a small effusion and chronic-appearing degenerative tearing of the anterior horn and body of the lateral meniscus with an underlying cartilage fissure. We reviewed treatment options inclusive of pain control, activity modification, bracing, formal physical therapy, injections or surgical referral.    At this time, will proceed with the following plan    - Activity modifications: Avoid activities that provoke pain including deep knee flexion (squats, lunges) and high impact activity.  - Referral placed at last visit for formal physical therapy. Please call [phone] to schedule with Gaby Villalobos, Mihai Maurice or Lilibeth Bhatt.   - NSAIDS as needed for pain.   - Tylenol 1000 mg up to 3 times per day as needed for pain.    - Ice the knee(s) for 10-15 minutes 3-4 times per day as needed for flares.   - Compression can be helpful for comfort.     Follow up after 6 weeks of physical therapy if symptoms are not improving, at which time may consider  injections (cortisone, hyaluronic acid or platelet rich plasma). You may call our direct clinic number ([phone]) at any time with questions or concerns.    Yael Muhammad MD, [facility]      -----    SUBJECTIVE:  Julisa Tillman is a 43 year old female who is seen in follow-up for right knee. They were last seen 3/23/2023. Since that time, an MRI has been ordered to investigate for internal derangement with apparent Segond fracture on XR.     Since their last visit reports ongoing pain after exercising. She has been doing [employer] and strength workouts. She has avoided squats. They indicate that their current pain level is 6/10. They have tried rest/activity avoidance, ice, other medications: Diclofenac 50mg PRN - provides relief, previous imaging (MRI 4/4/23 and xray 3/23/23) and compression - wears during workouts and is helpful.      The patient is seen by themselves.    Patient's past medical, surgical, social, and family histories were reviewed today and no changes are noted.    REVIEW OF SYSTEMS:  Constitutional: NEGATIVE for fever, chills, change in weight  Skin: NEGATIVE for worrisome rashes, moles or lesions  GI/: NEGATIVE for bowel or bladder changes  Neuro: NEGATIVE for weakness, dizziness or paresthesias    OBJECTIVE:  Limited by telephone encounter  General: healthy, alert and in no distress    Independent visualization of the below image:  Results for orders placed or performed during the hospital encounter of 04/04/23   MR Knee Right w/o Contrast        EXAM: MR KNEE RIGHT W/O CONTRAST  LOCATION: [facility]  DATE/TIME: 4/5/2023 8:36 AM    INDICATION: Chronic, progressive right knee pain x2 years, x-ray showing Segond fracture concerning for ACL injury.  COMPARISON: 03/23/2023.  TECHNIQUE: Unenhanced.    FINDINGS:    MEDIAL COMPARTMENT:   -Meniscus: Normal.  -Cartilage: Normal.    LATERAL COMPARTMENT:  -Meniscus:  Intrasubstance degeneration with superimposed complex horizontal cleavage tear of the anterior horn of lateral meniscus extending towards the meniscal body.  -Cartilage: There is a full-thickness cartilage fissure along the posterior one-third of the tibial articulation where there is a small underlying bone contusion. Additional mixed areas of grade 2 to grade 3 tibial cartilage loss. No high-grade femoral   cartilage defects.    PATELLOFEMORAL COMPARTMENT:   -Alignment: Patella midline. No subluxation or tilting.   -Cartilage: High-grade cartilage loss and fissuring with subchondral cystic change lateral patella facet. No focal trochlear cartilage defects.    CRUCIATE LIGAMENTS:   -ACL: Normal.  -PCL: Normal.    COLLATERAL LIGAMENTS:   -Medial collateral ligament: Superficial and deep fibers are normal.  -Lateral collateral ligament: Normal.    POSTEROMEDIAL CORNER:  -Distal semimembranosus tendon is normal.   -Pes anserine tendons are normal. Posteromedial corner complex ligaments are intact.    POSTEROLATERAL CORNER:   -Popliteal tendon is intact. No tendinopathy.  -Biceps femoris tendon and posterolateral corner complex ligaments are intact.    EXTENSOR MECHANISM:   -Quadriceps tendon: Normal.  -Patellar tendon: Normal.  -Patellofemoral ligaments and retinacula: Intact.    JOINT:   -Small joint effusion. No discrete loose bodies.    BONES:  -There is no acute fracture or concerning marrow replacing lesions. The previously seen area of curvilinear bony density on radiograph along the lateral aspect of the proximal tibia is overlapping normal structures. There is no Segond fracture.    SOFT TISSUES:   -Trace popliteal cyst. Minimal intramuscular edema within the lateral head of the gastrocnemius.         IMPRESSION:  1.  No acute fracture. The previously seen curvilinear bony density along the lateral aspect of the proximal tibia is overlapping normal structures. There is no Segond fracture.  2.  Intrasubstance  degeneration with superimposed complex tear of the anterior horn and body of lateral meniscus. Full-thickness cartilage fissure posterior one-third lateral tibial plateau where there is a small underlying bone contusion.  3.  Mild to moderate chondromalacia patella along the lateral patella facet.  4.  Small joint effusion.  5.  Intact cruciate and collateral ligaments.         Yael Muhammad MD, [facility]              Again, thank you for allowing me to participate in the care of your patient.        Sincerely,        Yael Muhammad MD

## 2023-04-10 ENCOUNTER — THERAPY VISIT (OUTPATIENT)
Dept: PHYSICAL THERAPY | Facility: CLINIC | Age: 43
End: 2023-04-10
Attending: STUDENT IN AN ORGANIZED HEALTH CARE EDUCATION/TRAINING PROGRAM
Payer: COMMERCIAL

## 2023-04-10 DIAGNOSIS — M25.561 CHRONIC PAIN OF RIGHT KNEE: ICD-10-CM

## 2023-04-10 DIAGNOSIS — G89.29 CHRONIC PAIN OF RIGHT KNEE: ICD-10-CM

## 2023-04-10 DIAGNOSIS — M22.40 CHONDROMALACIA OF PATELLOFEMORAL JOINT, UNSPECIFIED LATERALITY: ICD-10-CM

## 2023-04-10 PROCEDURE — 97161 PT EVAL LOW COMPLEX 20 MIN: CPT | Mod: GP | Performed by: PHYSICAL THERAPIST

## 2023-04-10 PROCEDURE — 97110 THERAPEUTIC EXERCISES: CPT | Mod: GP | Performed by: PHYSICAL THERAPIST

## 2023-04-10 ASSESSMENT — ACTIVITIES OF DAILY LIVING (ADL)
GO DOWN STAIRS: ACTIVITY IS FAIRLY DIFFICULT
KNEE_ACTIVITY_OF_DAILY_LIVING_SCORE: 62.86
KNEEL ON THE FRONT OF YOUR KNEE: ACTIVITY IS VERY DIFFICULT
HOW_WOULD_YOU_RATE_THE_OVERALL_FUNCTION_OF_YOUR_KNEE_DURING_YOUR_USUAL_DAILY_ACTIVITIES?: ABNORMAL
LIMPING: I DO NOT HAVE THE SYMPTOM
PAIN: THE SYMPTOM AFFECTS MY ACTIVITY MODERATELY
WALK: ACTIVITY IS NOT DIFFICULT
WEAKNESS: THE SYMPTOM AFFECTS MY ACTIVITY MODERATELY
RAW_SCORE: 44
SQUAT: ACTIVITY IS VERY DIFFICULT
SIT WITH YOUR KNEE BENT: ACTIVITY IS NOT DIFFICULT
RISE FROM A CHAIR: ACTIVITY IS MINIMALLY DIFFICULT
AS_A_RESULT_OF_YOUR_KNEE_INJURY,_HOW_WOULD_YOU_RATE_YOUR_CURRENT_LEVEL_OF_DAILY_ACTIVITY?: SEVERELY ABNORMAL
STAND: ACTIVITY IS NOT DIFFICULT
KNEE_ACTIVITY_OF_DAILY_LIVING_SUM: 44
STIFFNESS: THE SYMPTOM AFFECTS MY ACTIVITY SLIGHTLY
GIVING WAY, BUCKLING OR SHIFTING OF KNEE: THE SYMPTOM AFFECTS MY ACTIVITY SLIGHTLY
GO UP STAIRS: ACTIVITY IS FAIRLY DIFFICULT
SWELLING: I HAVE THE SYMPTOM BUT IT DOES NOT AFFECT MY ACTIVITY
HOW_WOULD_YOU_RATE_THE_CURRENT_FUNCTION_OF_YOUR_KNEE_DURING_YOUR_USUAL_DAILY_ACTIVITIES_ON_A_SCALE_FROM_0_TO_100_WITH_100_BEING_YOUR_LEVEL_OF_KNEE_FUNCTION_PRIOR_TO_YOUR_INJURY_AND_0_BEING_THE_INABILITY_TO_PERFORM_ANY_OF_YOUR_USUAL_DAILY_ACTIVITIES?: 60

## 2023-04-10 NOTE — PROGRESS NOTES
[facility] Initial Evaluation     Present: no    Subjective:  Julisa Tillman is a 43 year old female with complaints of right knee, left knee . Pt reports that she's always had on and off symptoms in oth knees worse with squats and lunges and with stairs. Denies vague symptoms. Wants to get back to full right knee functional capacity pain free, including squatting/lunges, and cycling.     Symptoms commenced as a result of: chronic, other. Condition occurred in the following environment: other. Onset of symptoms: Chronic, reccurent worst since Jan 2023. Location of symptoms: superior patellar region, deep in the joint. Pain level on number scale: 8/10. Quality of pain: aching, shooting. Associated symptoms: popping, clicking. Pain frequency (constant/intermittent): constant with flares. Symptoms are exacerbated by: squats, lunges, stairs, hiking. Symptoms are relieved by: avoidance, rest, icing. Progression of symptoms since onset: somewhat better. Imaging: Xray, MRI (see Epic for full report). Previous treatment: PT, exercise, sleeve, ice, pain meds. Response to previous treatment: some. General health as reported by patient is excellent. Pertinent medical history includes: See Epic. Medical allergies: see Epic. Other pertinent surgeries: see Epic. Current medications: See Epic. Occupation: [employer]. Work/restriction status: none. Primary job tasks: sitting, computer work. Barriers at home/work: None reported by patient. Red flags: None reported by patient.    Objective  Gait:  unremarkable  Screening: negative hip    Flexibility: unremarkable    Knee AROM/PROM: R 0-0135*, L 0-0-145    Hip ROM: WFL henri pain free    Knee Strength (* = pain) Right Left   FL 5-/5 5/5   EXT 5-/5 5/5   Quad Contraction (Good/Fair/Poor) Fair* good   Hip Extension 5-/5 5/5   Hip ABD 4+/5 5/5     Special Tests Right Left   Lachman - -   Anterior Drawer - -   Posterior Drawer - -   Valgus Stress - Medial  Instability - -   Varus Stress - Lateral Instability - -   Robles (Lateral Meniscus) + -   Robles (Medial Meniscus) + -   Patellar Grind Test + -     Palpation Tenderness:  unremarkable    Swelling/Circumferential Measurements: unremarkable      Accessory Motion: hypomobile patella right all planes with concordant pain    Functional Squat: poor squat depth with pain and right knee crepitus    Balance: good SLS henri    Other tests: none  Key Findings  Chronic right knee pain/PFPS with poor squat, fair QS with pain, will benefit from skilled PT, with some BFR, possible taping  Assessment/Plan:    Patient is a 43 year old female with right side knee complaints.    Patient has the following significant findings with corresponding treatment plan.                Diagnosis 1:  Chronic right knee pain, PFPS right knee  Pain -  manual therapy, splint/taping/bracing/orthotics, self management, education and home program  Decreased ROM/flexibility - manual therapy and therapeutic exercise  Decreased joint mobility - manual therapy and therapeutic exercise  Decreased strength - therapeutic exercise and therapeutic activities  Impaired muscle performance - neuro re-education  Decreased function - therapeutic activities  Impaired posture - neuro re-education    Therapy Evaluation Codes:     1) History comprised of:   Personal factors that impact the plan of care:      None.    Comorbidity factors that impact the plan of care are:      None.     Medications impacting care: Pain.  2) Examination of Body Systems comprised of:   Body structures and functions that impact the plan of care:      Knee.   Activity limitations that impact the plan of care are:      Lifting, Running, Sports, Squatting/kneeling, Stairs, Standing and Walking.  3) Clinical presentation characteristics are:   Stable/Uncomplicated.  4) Decision-Making    Low complexity using standardized patient assessment instrument and/or measureable assessment of  functional outcome.    Cumulative Therapy Evaluation is: Low complexity.    Previous and current functional limitations:  (See Goal Flow Sheet for this information)    Short term and Long term goals: (See Goal Flow Sheet for this information)     Communication ability:  Patient appears to be able to clearly communicate and understand verbal and written communication and follow directions correctly.  Treatment Explanation - The following has been discussed with the patient:   RX ordered/plan of care  Anticipated outcomes  Possible risks and side effects  This patient would benefit from PT intervention to resume normal activities.   Rehab potential is good.    Frequency:  1 X week, once daily  Duration:  for 8 weeks  Discharge Plan:  Achieve all LTG.  Independent in home treatment program.  Reach maximal therapeutic benefit.    Please refer to the daily flowsheet for treatment today, total treatment time and time spent performing 1:1 timed codes.     Inquires  Mihai Maurice PT, DPT, CSCS  Physical Therapist  [facility]  [address] [facility], [address] [address] [phone]

## 2023-04-19 ENCOUNTER — THERAPY VISIT (OUTPATIENT)
Dept: PHYSICAL THERAPY | Facility: CLINIC | Age: 43
End: 2023-04-19
Attending: STUDENT IN AN ORGANIZED HEALTH CARE EDUCATION/TRAINING PROGRAM
Payer: COMMERCIAL

## 2023-04-19 DIAGNOSIS — M22.40 CHONDROMALACIA OF PATELLOFEMORAL JOINT, UNSPECIFIED LATERALITY: ICD-10-CM

## 2023-04-19 DIAGNOSIS — M25.561 CHRONIC PAIN OF RIGHT KNEE: Primary | ICD-10-CM

## 2023-04-19 DIAGNOSIS — G89.29 CHRONIC PAIN OF RIGHT KNEE: Primary | ICD-10-CM

## 2023-04-19 PROCEDURE — 97110 THERAPEUTIC EXERCISES: CPT | Mod: GP | Performed by: PHYSICAL THERAPIST

## 2023-04-19 NOTE — PROGRESS NOTES
NOTES:   Date  4/19/23 Limb Occlusion Pressure  172 Percent (%) Occlusion  80 Training Occlusion Pressure  134 Exercises Performed  SLR(first two sets, then LAQ with 3# last to sets)   Total time under tourniquet  7 min   Immediate effects  fatigue Lingering effects (from previous session)  none   Blood Flow Restriction Training: Contraindications and precautions reviewed, pt safe for use of modality, Risks and benefits discussed; pt gave informed consent    Inquires  Mihai Maurice PT, DPT, CSCS  Physical Therapist  [facility] [address] [address]  [phone]

## 2023-04-26 ENCOUNTER — TELEPHONE (OUTPATIENT)
Dept: GASTROENTEROLOGY | Facility: CLINIC | Age: 43
End: 2023-04-26
Payer: COMMERCIAL

## 2023-04-26 NOTE — TELEPHONE ENCOUNTER
Screening Questions  BLUE  KIND OF PREP RED  LOCATION [review exclusion criteria] GREEN  SEDATION TYPE        Y Are you active on mychart?       CASTRO HONG Ordering/Referring Provider?        [facility] What type of coverage do you have?      N Have you had a positive covid test in the last 14 days?     22.9 1. BMI  [BMI 40+ - review exclusion criteria& smart-phrase document]    Y  2. Are you able to give consent for your medical care? [IF NO,RN REVIEW]          N  3. Are you taking any prescription pain medications on a routine schedule   (ex narcotics: oxycodone, roxicodone, oxycontin,  and percocet)? [RN Review]          3a. EXTENDED PREP What kind of prescription?     N 4. Do you have any chemical dependencies such as alcohol, street drugs, or methadone?        **If yes 3- 5 , please schedule with MAC sedation.**          IF YES TO ANY 6 - 10 - HOSPITAL SETTING ONLY.     N 6.   Do you need assistance transferring?     N 7.   Have you had a heart or lung transplant?    N 8.   Are you currently on dialysis?   N 9.   Do you use daily home oxygen?   N 10. Do you take nitroglycerin?   10a.  If yes, how often?     11. [FEMALES]   Are you currently pregnant?    11a.  If yes, how many weeks? [ Greater than 12 weeks, OR NEEDED]    N 12. Do you have Pulmonary Hypertension? *NEED PAC APPT AT [facility] w/ [facility]*     N 13. [review exclusion criteria]  Do you have any implantable devices in your body (pacemaker, defib, LVAD)?    N 14. In the past 6 months, have you had any heart related issues including cardiomyopathy or heart attack?     14a.  If yes, did it require cardiac stenting if so when?     N 15. Have you had a stroke or Transient ischemic attack (TIA - aka  mini stroke ) within 6 months?      N 16. Do you have mod to severe Obstructive Sleep Apnea?  [Hospital only]    N 17. Do you have SEVERE AND UNCONTROLLED asthma? *NEED PAC APPT AT [facility] w/MAC*     18. Are you currently taking any blood thinners?     18a. No.  "Continue to 19.   18b. Yes/no Blood Thinner: No [CONTINUE TO #19]    N 19. Do you take the medication Phentermine?    19a. If yes, \"Hold for 7 days before procedure.  Please consult your prescribing provider if you have questions about holding this medication.\"     N  20. Do you have chronic kidney disease?      N  21. Do you have a diagnosis of diabetes?     N  22. On a regular basis do you go 3-5 days between bowel movements?      23. Preferred LOCAL Pharmacy for Pre Prescription    [ LIST ONLY ONE PHARMACY]     [facility] [address] [place] [address]    - CLOSING REMINDERS -    Informed patient they will need an adult    Cannot take any type of public or medical transportation alone    Conscious Sedation- Needs  for 6 hours after the procedure       MAC/General-Needs  for 24 hours after procedure    Pre-Procedure Covid test to be completed [[facility] PCR Testing Required]    Confirmed Nurse will call to complete assessment       - SCHEDULING DETAILS -  NO Hospital Setting Required? If yes, what is the exclusion?:    CARSON  Surgeon    5/19/2023  Date of Procedure  Lower Endoscopy [Colonoscopy]  Type of Procedure Scheduled  [facility] Location   MIRALAX GATORADE WITHOUT MAGNEISUM CITRATE Which Colonoscopy Prep was Sent?     MODERATE Sedation Type     NO PAC / Pre-op Required                 "

## 2023-04-27 ENCOUNTER — THERAPY VISIT (OUTPATIENT)
Dept: PHYSICAL THERAPY | Facility: CLINIC | Age: 43
End: 2023-04-27
Payer: COMMERCIAL

## 2023-04-27 DIAGNOSIS — M22.40 CHONDROMALACIA OF PATELLOFEMORAL JOINT, UNSPECIFIED LATERALITY: ICD-10-CM

## 2023-04-27 DIAGNOSIS — M25.561 CHRONIC PAIN OF RIGHT KNEE: Primary | ICD-10-CM

## 2023-04-27 DIAGNOSIS — G89.29 CHRONIC PAIN OF RIGHT KNEE: Primary | ICD-10-CM

## 2023-04-27 PROCEDURE — 97110 THERAPEUTIC EXERCISES: CPT | Mod: GP | Performed by: PHYSICAL THERAPIST

## 2023-04-27 ASSESSMENT — ACTIVITIES OF DAILY LIVING (ADL)
AS_A_RESULT_OF_YOUR_KNEE_INJURY,_HOW_WOULD_YOU_RATE_YOUR_CURRENT_LEVEL_OF_DAILY_ACTIVITY?: NEARLY NORMAL
HOW_WOULD_YOU_RATE_THE_CURRENT_FUNCTION_OF_YOUR_KNEE_DURING_YOUR_USUAL_DAILY_ACTIVITIES_ON_A_SCALE_FROM_0_TO_100_WITH_100_BEING_YOUR_LEVEL_OF_KNEE_FUNCTION_PRIOR_TO_YOUR_INJURY_AND_0_BEING_THE_INABILITY_TO_PERFORM_ANY_OF_YOUR_USUAL_DAILY_ACTIVITIES?: 80
KNEE_ACTIVITY_OF_DAILY_LIVING_SUM: 64
SQUAT: ACTIVITY IS MINIMALLY DIFFICULT
RAW_SCORE: 64
LIMPING: I DO NOT HAVE THE SYMPTOM
KNEEL ON THE FRONT OF YOUR KNEE: ACTIVITY IS MINIMALLY DIFFICULT
WEAKNESS: I HAVE THE SYMPTOM BUT IT DOES NOT AFFECT MY ACTIVITY
SWELLING: I DO NOT HAVE THE SYMPTOM
GO UP STAIRS: ACTIVITY IS NOT DIFFICULT
WALK: ACTIVITY IS NOT DIFFICULT
SIT WITH YOUR KNEE BENT: ACTIVITY IS NOT DIFFICULT
STAND: ACTIVITY IS NOT DIFFICULT
PAIN: I HAVE THE SYMPTOM BUT IT DOES NOT AFFECT MY ACTIVITY
KNEE_ACTIVITY_OF_DAILY_LIVING_SCORE: 91.43
RISE FROM A CHAIR: ACTIVITY IS MINIMALLY DIFFICULT
GIVING WAY, BUCKLING OR SHIFTING OF KNEE: I DO NOT HAVE THE SYMPTOM
HOW_WOULD_YOU_RATE_THE_OVERALL_FUNCTION_OF_YOUR_KNEE_DURING_YOUR_USUAL_DAILY_ACTIVITIES?: NEARLY NORMAL
STIFFNESS: I HAVE THE SYMPTOM BUT IT DOES NOT AFFECT MY ACTIVITY
GO DOWN STAIRS: ACTIVITY IS NOT DIFFICULT

## 2023-04-27 NOTE — PROGRESS NOTES
NOTES:   Date  4/27/23 Limb Occlusion Pressure  169 Percent (%) Occlusion  80 Training Occlusion Pressure  134 Exercises Performed  SLR  SL leg press 2pl   Total time under tourniquet  14 min    Immediate effects  fatigue Lingering effects (from previous session)  none   Blood Flow Restriction Training: Contraindications and precautions reviewed, pt safe for use of modality, Risks and benefits discussed; pt gave informed consent    Inquires  Mihai Maurice PT, DPT, CSCS  Physical Therapist  [facility], [address] [address] [phone]

## 2023-05-01 ENCOUNTER — THERAPY VISIT (OUTPATIENT)
Dept: PHYSICAL THERAPY | Facility: CLINIC | Age: 43
End: 2023-05-01
Payer: COMMERCIAL

## 2023-05-01 DIAGNOSIS — G89.29 CHRONIC PAIN OF RIGHT KNEE: Primary | ICD-10-CM

## 2023-05-01 DIAGNOSIS — M22.40 CHONDROMALACIA OF PATELLOFEMORAL JOINT, UNSPECIFIED LATERALITY: ICD-10-CM

## 2023-05-01 DIAGNOSIS — M25.561 CHRONIC PAIN OF RIGHT KNEE: Primary | ICD-10-CM

## 2023-05-01 PROCEDURE — 97110 THERAPEUTIC EXERCISES: CPT | Mod: GP | Performed by: PHYSICAL THERAPIST

## 2023-05-01 NOTE — PROGRESS NOTES
NOTES:   Date  5/1/23 Limb Occlusion Pressure  159 Percent (%) Occlusion  80 Training Occlusion Pressure  127 Exercises Performed  SLR (burnt out after 3 sets, QS USP through last set)  SL Leg press   Total time under tourniquet  14 min   Immediate effects  fatigue Lingering effects (from previous session)  none   Blood Flow Restriction Training: Contraindications and precautions reviewed, pt safe for use of modality, Risks and benefits discussed; pt gave informed consent    Inquires  Mihai Maurice PT, DPT, CSCS  Physical Therapist  [facility]  [address] [facility], [address] [address]  [phone]

## 2023-05-06 ENCOUNTER — HEALTH MAINTENANCE LETTER (OUTPATIENT)
Age: 43
End: 2023-05-06

## 2023-05-08 ENCOUNTER — THERAPY VISIT (OUTPATIENT)
Dept: PHYSICAL THERAPY | Facility: CLINIC | Age: 43
End: 2023-05-08
Payer: COMMERCIAL

## 2023-05-08 DIAGNOSIS — M25.561 CHRONIC PAIN OF RIGHT KNEE: Primary | ICD-10-CM

## 2023-05-08 DIAGNOSIS — G89.29 CHRONIC PAIN OF RIGHT KNEE: Primary | ICD-10-CM

## 2023-05-08 DIAGNOSIS — M22.40 CHONDROMALACIA OF PATELLOFEMORAL JOINT, UNSPECIFIED LATERALITY: ICD-10-CM

## 2023-05-08 PROCEDURE — 97110 THERAPEUTIC EXERCISES: CPT | Mod: GP | Performed by: PHYSICAL THERAPIST

## 2023-05-08 NOTE — PROGRESS NOTES
NOTES:   Date  5/8/23 Limb Occlusion Pressure  180 Percent (%) Occlusion  80 Training Occlusion Pressure  140 Exercises Performed  SLR   Leg Press 3pl 2 sets, 2pl 2 sets   Total time under tourniquet  14 min    Immediate effects  fatigue Lingering effects (from previous session)  none   Blood Flow Restriction Training: Contraindications and precautions reviewed, pt safe for use of modality, Risks and benefits discussed; pt gave informed consent    Inquires  Mihai Maurice PT, DPT, CSCS  Physical Therapist  [facility]  [id number] [place] [address] [phone]

## 2023-05-15 ENCOUNTER — THERAPY VISIT (OUTPATIENT)
Dept: PHYSICAL THERAPY | Facility: CLINIC | Age: 43
End: 2023-05-15
Payer: COMMERCIAL

## 2023-05-15 DIAGNOSIS — M25.561 CHRONIC PAIN OF RIGHT KNEE: Primary | ICD-10-CM

## 2023-05-15 DIAGNOSIS — G89.29 CHRONIC PAIN OF RIGHT KNEE: Primary | ICD-10-CM

## 2023-05-15 DIAGNOSIS — M22.40 CHONDROMALACIA OF PATELLOFEMORAL JOINT, UNSPECIFIED LATERALITY: ICD-10-CM

## 2023-05-15 PROCEDURE — 97110 THERAPEUTIC EXERCISES: CPT | Mod: GP | Performed by: PHYSICAL THERAPIST

## 2023-05-15 NOTE — PROGRESS NOTES
NOTES:   Date  5/15/23 Limb Occlusion Pressure  152 Percent (%) Occlusion  80 Training Occlusion Pressure  122 Exercises Performed  SL Leg Press 2pl   BW squats     Total time under tourniquet  14 min    Immediate effects  fatigue Lingering effects (from previous session)  none   Blood Flow Restriction Training: Contraindications and precautions reviewed, pt safe for use of modality, Risks and benefits discussed; pt gave informed consent    Inquires  Mihai Maurice PT, DPT, CSCS  Physical Therapist  [facility], [address] [address] [phone]

## 2023-05-19 ENCOUNTER — HOSPITAL ENCOUNTER (OUTPATIENT)
Facility: CLINIC | Age: 43
Discharge: HOME | End: 2023-05-19
Attending: INTERNAL MEDICINE | Admitting: INTERNAL MEDICINE
Payer: COMMERCIAL

## 2023-05-19 VITALS
HEART RATE: 78 BPM | DIASTOLIC BLOOD PRESSURE: 66 MMHG | HEIGHT: 67 IN | WEIGHT: 141 LBS | TEMPERATURE: 97 F | RESPIRATION RATE: 16 BRPM | SYSTOLIC BLOOD PRESSURE: 116 MMHG | BODY MASS INDEX: 22.13 KG/M2 | OXYGEN SATURATION: 100 %

## 2023-05-19 LAB — COLONOSCOPY: NORMAL

## 2023-05-19 PROCEDURE — G0121 COLON CA SCRN NOT HI RSK IND: HCPCS | Performed by: INTERNAL MEDICINE

## 2023-05-19 PROCEDURE — G0500 MOD SEDAT ENDO SERVICE >5YRS: HCPCS | Performed by: INTERNAL MEDICINE

## 2023-05-19 PROCEDURE — 250N000013 HC RX MED GY IP 250 OP 250 PS 637: Performed by: INTERNAL MEDICINE

## 2023-05-19 PROCEDURE — 250N000011 HC RX IP 250 OP 636: Performed by: INTERNAL MEDICINE

## 2023-05-19 PROCEDURE — 45378 DIAGNOSTIC COLONOSCOPY: CPT | Performed by: INTERNAL MEDICINE

## 2023-05-19 RX ORDER — LIDOCAINE 40 MG/G
CREAM TOPICAL
Status: DISCONTINUED | OUTPATIENT
Start: 2023-05-19 | End: 2023-05-19 | Stop reason: HOSPADM

## 2023-05-19 RX ORDER — FLUMAZENIL 0.1 MG/ML
0.2 INJECTION, SOLUTION INTRAVENOUS
Status: DISCONTINUED | OUTPATIENT
Start: 2023-05-19 | End: 2023-05-19 | Stop reason: HOSPADM

## 2023-05-19 RX ORDER — NALOXONE HYDROCHLORIDE 0.4 MG/ML
0.2 INJECTION, SOLUTION INTRAMUSCULAR; INTRAVENOUS; SUBCUTANEOUS
Status: DISCONTINUED | OUTPATIENT
Start: 2023-05-19 | End: 2023-05-19 | Stop reason: HOSPADM

## 2023-05-19 RX ORDER — SIMETHICONE 40MG/0.6ML
133 SUSPENSION, DROPS(FINAL DOSAGE FORM)(ML) ORAL
Status: COMPLETED | OUTPATIENT
Start: 2023-05-19 | End: 2023-05-19

## 2023-05-19 RX ORDER — ONDANSETRON 4 MG/1
4 TABLET, ORALLY DISINTEGRATING ORAL EVERY 6 HOURS PRN
Status: DISCONTINUED | OUTPATIENT
Start: 2023-05-19 | End: 2023-05-19 | Stop reason: HOSPADM

## 2023-05-19 RX ORDER — NALOXONE HYDROCHLORIDE 0.4 MG/ML
0.4 INJECTION, SOLUTION INTRAMUSCULAR; INTRAVENOUS; SUBCUTANEOUS
Status: DISCONTINUED | OUTPATIENT
Start: 2023-05-19 | End: 2023-05-19 | Stop reason: HOSPADM

## 2023-05-19 RX ORDER — ONDANSETRON 2 MG/ML
4 INJECTION INTRAMUSCULAR; INTRAVENOUS EVERY 6 HOURS PRN
Status: DISCONTINUED | OUTPATIENT
Start: 2023-05-19 | End: 2023-05-19 | Stop reason: HOSPADM

## 2023-05-19 RX ORDER — PROCHLORPERAZINE MALEATE 10 MG
10 TABLET ORAL EVERY 6 HOURS PRN
Status: DISCONTINUED | OUTPATIENT
Start: 2023-05-19 | End: 2023-05-19 | Stop reason: HOSPADM

## 2023-05-19 RX ORDER — DIPHENHYDRAMINE HYDROCHLORIDE 50 MG/ML
25-50 INJECTION INTRAMUSCULAR; INTRAVENOUS
Status: COMPLETED | OUTPATIENT
Start: 2023-05-19 | End: 2023-05-19

## 2023-05-19 RX ORDER — ONDANSETRON 2 MG/ML
4 INJECTION INTRAMUSCULAR; INTRAVENOUS
Status: COMPLETED | OUTPATIENT
Start: 2023-05-19 | End: 2023-05-19

## 2023-05-19 RX ORDER — FENTANYL CITRATE 50 UG/ML
50-100 INJECTION, SOLUTION INTRAMUSCULAR; INTRAVENOUS EVERY 5 MIN PRN
Status: DISCONTINUED | OUTPATIENT
Start: 2023-05-19 | End: 2023-05-19 | Stop reason: HOSPADM

## 2023-05-19 RX ORDER — EPINEPHRINE 1 MG/ML
0.1 INJECTION, SOLUTION INTRAMUSCULAR; SUBCUTANEOUS
Status: DISCONTINUED | OUTPATIENT
Start: 2023-05-19 | End: 2023-05-19 | Stop reason: HOSPADM

## 2023-05-19 RX ORDER — ATROPINE SULFATE 0.1 MG/ML
1 INJECTION INTRAVENOUS
Status: DISCONTINUED | OUTPATIENT
Start: 2023-05-19 | End: 2023-05-19 | Stop reason: HOSPADM

## 2023-05-19 RX ADMIN — FENTANYL CITRATE 50 MCG: 0.05 INJECTION, SOLUTION INTRAMUSCULAR; INTRAVENOUS at 10:33

## 2023-05-19 RX ADMIN — SIMETHICONE 133 MG: 20 EMULSION ORAL at 10:37

## 2023-05-19 RX ADMIN — DIPHENHYDRAMINE HYDROCHLORIDE 50 MG: 50 INJECTION INTRAMUSCULAR; INTRAVENOUS at 10:23

## 2023-05-19 RX ADMIN — MIDAZOLAM 1 MG: 1 INJECTION INTRAMUSCULAR; INTRAVENOUS at 10:33

## 2023-05-19 RX ADMIN — MIDAZOLAM 2 MG: 1 INJECTION INTRAMUSCULAR; INTRAVENOUS at 10:26

## 2023-05-19 RX ADMIN — ONDANSETRON 4 MG: 2 INJECTION INTRAMUSCULAR; INTRAVENOUS at 09:45

## 2023-05-19 RX ADMIN — FENTANYL CITRATE 100 MCG: 0.05 INJECTION, SOLUTION INTRAMUSCULAR; INTRAVENOUS at 10:26

## 2023-05-19 ASSESSMENT — ACTIVITIES OF DAILY LIVING (ADL): ADLS_ACUITY_SCORE: 35

## 2023-05-19 NOTE — PROGRESS NOTES
[facility]  Pre-Endoscopy History and Physical     Julisa Tillman MRN# [id number]   YOB: 1980 Age: 43 year old   Today's Date: 05/19/2023    Date of Procedure: 5/19/2023  Primary care provider: Carmen Pham  Type of Endoscopy: colonoscopy  Reason for Procedure: Screening  Type of Anesthesia Anticipated: Moderate (conscious) sedation    HPI:    Julisa is a 43 year old female who will be undergoing the above procedure.      A history and physical has been performed. The patient's medications and allergies have been reviewed. The risks and benefits of the procedure and the sedation options and risks were discussed with the patient.  All questions were answered and informed consent was obtained.      No Known Allergies     Current Facility-Administered Medications   Medication     0.9% sodium chloride BOLUS     atropine injection 1 mg     benzocaine 20% (HURRICAINE/TOPEX) 20 % spray 0.5 mL     diphenhydrAMINE (BENADRYL) injection 25-50 mg     EPINEPHrine (Anaphylaxis) (ADRENALIN) injection (vial) 0.1 mg     fentaNYL (PF) (SUBLIMAZE) injection  mcg     flumazenil (ROMAZICON) injection 0.2 mg     glucagon injection 0.5 mg     lidocaine (LMX4) cream     lidocaine 1 % 0.1-1 mL     midazolam (VERSED) injection 0.5-2 mg     naloxone (NARCAN) injection 0.2 mg    Or     naloxone (NARCAN) injection 0.4 mg    Or     naloxone (NARCAN) injection 0.2 mg    Or     naloxone (NARCAN) injection 0.4 mg     simethicone (MYLICON) suspension 133 mg     sodium chloride (PF) 0.9% PF flush 3 mL     sodium chloride (PF) 0.9% PF flush 3 mL     sodium chloride (PF) 0.9% PF flush 3 mL       Patient Active Problem List   Diagnosis     Anxiety - Ongoing care with Jennifer caruso     Anxiety attack     Cervical high risk HPV (human papillomavirus) test positive     Former smoker     Recurrent major depression in complete remission (H) - Ongoing care with psychiatryJennifer      Eating disorder, unspecified type -  - anorexia, bulimia and exercise bulimia. Issues off/on since age 6.      Consultation for female sterilization     Vaginal itching     High risk HPV infection     ROWENA III with severe dysplasia     Request for sterilization     ROWENA III (cervical intraepithelial neoplasia III)     Chronic pain of right knee     Chondromalacia of patellofemoral joint, bilateral knees        Past Medical History:   Diagnosis Date     Cervical high risk HPV (human papillomavirus) test positive 2018 NIL, +HPV 16     Chondromalacia of patellofemoral joint, bilateral knees 4/10/2023     Depressive disorder 1994     Dysmenorrhea 10/13/2020    Added automatically from request for surgery [id number]     Major depression, recurrent (H) 2016     PONV (postoperative nausea and vomiting)      Smoker 10/13/2016     Suicidal ideation 2018        Past Surgical History:   Procedure Laterality Date     ENT SURGERY      T&A as a child     LAPAROSCOPIC HYSTERECTOMY TOTAL, SALPINGECTOMY BILATERAL Bilateral 12/15/2020    Procedure: TOTAL LAPAROSCOPIC HYSTERECTOMY, BILATERAL SALPINGECTOMY, CYSTOSCOPY;  Surgeon: Tyler Baer MD;  Location: [facility] OR     LAPAROSCOPY DIAGNOSTIC (GYN)       LEEP TX, CERVICAL  2020       Social History     Tobacco Use     Smoking status: Former     Packs/day: 0.25     Years: 20.00     Pack years: 5.00     Types: Cigarettes     Quit date: 2018     Years since quittin.8     Smokeless tobacco: Never   Vaping Use     Vaping status: Never Used   Substance Use Topics     Alcohol use: No       Family History   Problem Relation Age of Onset     Hyperlipidemia Mother      Depression Mother      Substance Abuse Mother      Diabetes Father      Coronary Artery Disease Father 68        CABGx4, non-smoker, overweight     Hypertension Father      Other Cancer Father      Substance Abuse Father      Obesity Father      Breast Cancer Maternal Grandfather       "Substance Abuse Maternal Grandfather      Glaucoma Paternal Grandmother      Substance Abuse Paternal Grandfather      Thyroid Disease Daughter         congenital agenesis of thyroid     Colon Cancer No family hx of      Macular Degeneration No family hx of          PHYSICAL EXAM:   /69   Pulse 69   Temp 97  F (36.1  C)   Resp 16   Ht 1.702 m (5' 7\")   Wt 64 kg (141 lb)   LMP 11/29/2020 (Exact Date)   SpO2 98%   BMI 22.08 kg/m   Estimated body mass index is 22.08 kg/m  as calculated from the following:    Height as of this encounter: 1.702 m (5' 7\").    Weight as of this encounter: 64 kg (141 lb).   RESP: lungs clear to auscultation - no rales, rhonchi or wheezes  CV: regular rates and rhythm    IMPRESSION   ASA Class 2 - Mild systemic disease  Mallampati Score: 1      Palomo Huff MD                "

## 2023-05-24 ENCOUNTER — THERAPY VISIT (OUTPATIENT)
Dept: PHYSICAL THERAPY | Facility: CLINIC | Age: 43
End: 2023-05-24
Payer: COMMERCIAL

## 2023-05-24 DIAGNOSIS — G89.29 CHRONIC PAIN OF RIGHT KNEE: Primary | ICD-10-CM

## 2023-05-24 DIAGNOSIS — M25.561 CHRONIC PAIN OF RIGHT KNEE: Primary | ICD-10-CM

## 2023-05-24 PROCEDURE — 97110 THERAPEUTIC EXERCISES: CPT | Mod: GP | Performed by: PHYSICAL THERAPIST

## 2023-05-24 NOTE — PROGRESS NOTES
NOTES:   Date  5/24/23 Limb Occlusion Pressure  156 Percent (%) Occlusion  80 Training Occlusion Pressure  123 Exercises Performed  BW Squat  SL Leg Press 2pl    Total time under tourniquet  14 min   Immediate effects  fatigue Lingering effects (from previous session)  none   Blood Flow Restriction Training: Contraindications and precautions reviewed, pt safe for use of modality, Risks and benefits discussed; pt gave informed consent    Inquires  Mihai Maurice PT, DPT, CSCS  Physical Therapist  [facility], [address] [address] [phone]

## 2023-05-25 NOTE — Clinical Note
Tyler Baer M.D. FACOG [facility] [address] [place]  [address]  [phone]    Phone  [phone]    Fax      Dear colleagues,         Thank you for the opportunity to see your patient. Please see my office visit notes for your review.  As always, I appreciate the opportunity to participate in your patient's care.     Sincerely yours,    Tyler Baer M.D.   Azathioprine Pregnancy And Lactation Text: This medication is Pregnancy Category D and isn't considered safe during pregnancy. It is unknown if this medication is excreted in breast milk.

## 2023-06-09 ENCOUNTER — NURSE TRIAGE (OUTPATIENT)
Dept: NURSING | Facility: CLINIC | Age: 43
End: 2023-06-09
Payer: COMMERCIAL

## 2023-06-09 NOTE — TELEPHONE ENCOUNTER
No triage.     Patient [place] being out of state at this time.    Reason for Disposition    Health Information question, no triage required and triager able to answer question    Protocols used: INFORMATION ONLY CALL - NO TRIAGE-A-

## 2023-07-01 NOTE — TELEPHONE ENCOUNTER
5/16/18 NIL, +HPV 16. Plan colp  12/14/18 Patient is lost to pap tracking follow-up.  7/16/19 [facility] ECC: ROWENA 2-3. Plan LEEP - not completed  9/14/20 HSIL pap, + HR HPV 16 & other HR type. Plan LEEP  9/29/20 LEEP scheduled   pain management ,PT/OT

## 2023-07-05 NOTE — PROGRESS NOTES
DISCHARGE SUMMARY    Julisa Tillman was seen   times for evaluation and treatment.  Patient did not return for further treatment and current status is unknown.  Due to short treatment duration, no objective or functional changes were made.  Please see goal flow sheet from episode noted date below and initial evaluation for further information.  Patient is discharged from therapy and therapy episode is resolved as of 07/05/23.      Linked Episodes   Type: Episode: Status: Noted: Resolved: Last update: Updated by:   PHYSICAL THERAPY Right knee 4/10/23 Active 4/10/2023  5/24/2023  8:12 AM Mihai Maurice, PT      Comments:

## 2023-08-29 NOTE — ANESTHESIA PROCEDURE NOTES
55 years old male with h/o HTN, HLD, h/o left MCA CVA, seizure was brought in to ED for seizure concern. Patient is relatively a poor history despite using  ID [id number]. Per triage note, patient was brought in for witness seizure at work. Per patient, he reported felt lightheaded at around 7:30AM and sat down. He feel like he had a seizure. Patient reported that he has not been drinking for 1 year or so but yesterday, patient drank 150ml of vodka. Denied any urinary/bladder incontinence. Patient has not been taking keppra for > 1 year.   Patient was confused upon ED arrival, had double vision. ED talked with telestroke, no TPA, likely seizure but need to R/O CVA. Hemodynamically stable, afebrile, sat well at RA. No leukocytosis, plt 204, K 3.3, Cr 0.85, lactate 1.4, hsTnt 4.5. CT head with old large left MCA infarction. No intracranial hemorrhage/lesion is noted. CTA with intracranial atherosclerosis. No large vessel occlusion. CT perfusion with no acute infarction of the brain is convincingly demonstrated.    SH: reported stopped drinking for 1 years, started drinking yesterday ( occasional drinking per triage- different history) Airway   Date/Time: 12/15/2020 7:48 AM   Patient location during procedure: OR    Staff -   Anesthesiologist:  Subhash Desai MD  CRNA: Naveed Lorenzo APRN CRNA  Performed By: CRNA    Indications and Patient Condition  Indications for airway management: stephany-procedural  Induction type:intravenousMask difficulty assessment: 1 - vent by mask    Final Airway Details  Final airway type: endotracheal airway  Successful airway:ETT - single  Endotracheal Airway Details   ETT size (mm): 7.0  Cuffed: yes  Successful intubation technique: video laryngoscopy  Grade View of Cords: 1  Adjucts: stylet  Measured from: lips  Secured at (cm): 21  Secured with: plastic tape  Bite block used: Soft    Post intubation assessment   Placement verified by: capnometry and equal breath sounds   Number of attempts at approach: 1  Secured with:plastic tape  Ease of procedure: easy  Dentition: Intact

## 2023-12-27 ENCOUNTER — ANCILLARY PROCEDURE (OUTPATIENT)
Dept: MAMMOGRAPHY | Facility: CLINIC | Age: 43
End: 2023-12-27
Attending: PHYSICIAN ASSISTANT
Payer: COMMERCIAL

## 2023-12-27 DIAGNOSIS — Z12.31 VISIT FOR SCREENING MAMMOGRAM: ICD-10-CM

## 2023-12-27 PROCEDURE — 77067 SCR MAMMO BI INCL CAD: CPT | Mod: TC | Performed by: RADIOLOGY

## 2024-02-16 ENCOUNTER — PATIENT OUTREACH (OUTPATIENT)
Dept: FAMILY MEDICINE | Facility: CLINIC | Age: 44
End: 2024-02-16
Payer: COMMERCIAL

## 2024-02-16 DIAGNOSIS — D06.9 CIN III WITH SEVERE DYSPLASIA: Primary | ICD-10-CM

## 2024-04-16 ENCOUNTER — OFFICE VISIT (OUTPATIENT)
Dept: FAMILY MEDICINE | Facility: CLINIC | Age: 44
End: 2024-04-16
Payer: COMMERCIAL

## 2024-04-16 VITALS
RESPIRATION RATE: 12 BRPM | BODY MASS INDEX: 24.33 KG/M2 | SYSTOLIC BLOOD PRESSURE: 120 MMHG | HEART RATE: 102 BPM | WEIGHT: 155 LBS | DIASTOLIC BLOOD PRESSURE: 72 MMHG | TEMPERATURE: 98.8 F | HEIGHT: 67 IN | OXYGEN SATURATION: 99 %

## 2024-04-16 DIAGNOSIS — Z12.4 CERVICAL CANCER SCREENING: Primary | ICD-10-CM

## 2024-04-16 DIAGNOSIS — Z13.29 THYROID DISORDER SCREENING: ICD-10-CM

## 2024-04-16 DIAGNOSIS — Z13.21 ENCOUNTER FOR VITAMIN DEFICIENCY SCREENING: ICD-10-CM

## 2024-04-16 DIAGNOSIS — Z13.0 SCREENING FOR DEFICIENCY ANEMIA: ICD-10-CM

## 2024-04-16 DIAGNOSIS — Z13.1 SCREENING FOR DIABETES MELLITUS: ICD-10-CM

## 2024-04-16 DIAGNOSIS — Z13.220 SCREENING CHOLESTEROL LEVEL: ICD-10-CM

## 2024-04-16 DIAGNOSIS — Z00.00 ROUTINE PHYSICAL EXAMINATION: ICD-10-CM

## 2024-04-16 PROCEDURE — 99396 PREV VISIT EST AGE 40-64: CPT | Performed by: NURSE PRACTITIONER

## 2024-04-16 PROCEDURE — 87624 HPV HI-RISK TYP POOLED RSLT: CPT | Performed by: NURSE PRACTITIONER

## 2024-04-16 PROCEDURE — G0145 SCR C/V CYTO,THINLAYER,RESCR: HCPCS | Performed by: NURSE PRACTITIONER

## 2024-04-16 SDOH — HEALTH STABILITY: PHYSICAL HEALTH: ON AVERAGE, HOW MANY DAYS PER WEEK DO YOU ENGAGE IN MODERATE TO STRENUOUS EXERCISE (LIKE A BRISK WALK)?: 7 DAYS

## 2024-04-16 ASSESSMENT — SOCIAL DETERMINANTS OF HEALTH (SDOH): HOW OFTEN DO YOU GET TOGETHER WITH FRIENDS OR RELATIVES?: ONCE A WEEK

## 2024-04-16 ASSESSMENT — PATIENT HEALTH QUESTIONNAIRE - PHQ9
SUM OF ALL RESPONSES TO PHQ QUESTIONS 1-9: 1
10. IF YOU CHECKED OFF ANY PROBLEMS, HOW DIFFICULT HAVE THESE PROBLEMS MADE IT FOR YOU TO DO YOUR WORK, TAKE CARE OF THINGS AT HOME, OR GET ALONG WITH OTHER PEOPLE: NOT DIFFICULT AT ALL
SUM OF ALL RESPONSES TO PHQ QUESTIONS 1-9: 1

## 2024-04-16 ASSESSMENT — PAIN SCALES - GENERAL: PAINLEVEL: NO PAIN (0)

## 2024-04-16 NOTE — PROGRESS NOTES
Preventive Care Visit  [facility]  Elizabeth Donaldson, MARLENE, Nurse Practitioner - Family  Apr 16, 2024      Assessment & Plan     Routine physical examination    Cervical cancer screening  - Pap Screen with HPV - recommended age 30 - 65 years    Encounter for vitamin deficiency screening  - Vitamin D Deficiency    Screening cholesterol level  - Lipid panel reflex to direct LDL Fasting    Screening for deficiency anemia  - CBC with platelets    Thyroid disorder screening  - TSH with free T4 reflex    Screening for diabetes mellitus  - Comprehensive metabolic panel (BMP + Alb, Alk Phos, ALT, AST, Total. Bili, TP)      Patient has been advised of split billing requirements and indicates understanding: Yes  Review of the result(s) of each unique test - lab  Ordering of each unique test        Counseling  Appropriate preventive services were discussed with this patient, including applicable screening as appropriate for fall prevention, nutrition, physical activity, Tobacco-use cessation, weight loss and cognition.  Checklist reviewing preventive services available has been given to the patient.  Reviewed patient's diet, addressing concerns and/or questions.       See Patient Instructions    Parker Egan is a 44 year old, presenting for the following:  Physical        4/16/2024    10:44 AM   Additional Questions   Roomed by SEGUN Montero   Accompanied by self        Health Care Directive  Patient does not have a Health Care Directive or Living Will: Advance Directive received and scanned. Click on Code in the patient header to view.    HPI      Depression and Anxiety   How are you doing with your depression since your last visit? Improved   How are you doing with your anxiety since your last visit?  Improved   Have you had a significant life event? No   Do you have any concerns with your use of alcohol or other drugs? No  Sees psychiatry     Social History     Tobacco Use    Smoking status: Former      Current packs/day: 0.00     Average packs/day: 0.3 packs/day for 20.0 years (5.0 ttl pk-yrs)     Types: Cigarettes     Start date: 1998     Quit date: 2018     Years since quittin.8    Smokeless tobacco: Never   Vaping Use    Vaping status: Never Used   Substance Use Topics    Alcohol use: No    Drug use: No         2022    12:19 PM 3/6/2023     1:29 PM 2024    10:37 AM   PHQ   PHQ-9 Total Score 3 0 1   Q9: Thoughts of better off dead/self-harm past 2 weeks Not at all Not at all Not at all         2019    11:42 AM 2020     4:33 PM 2022    12:19 PM   ADRIANA-7 SCORE   Total Score 17 0 4         2024    10:37 AM   Last PHQ-9   1.  Little interest or pleasure in doing things 0   2.  Feeling down, depressed, or hopeless 0   3.  Trouble falling or staying asleep, or sleeping too much 1   4.  Feeling tired or having little energy 0   5.  Poor appetite or overeating 0   6.  Feeling bad about yourself 0   7.  Trouble concentrating 0   8.  Moving slowly or restless 0   Q9: Thoughts of better off dead/self-harm past 2 weeks 0   PHQ-9 Total Score 1         2022    12:19 PM   ADRIANA-7    1. Feeling nervous, anxious, or on edge 1   2. Not being able to stop or control worrying 1   3. Worrying too much about different things 1   4. Trouble relaxing 0   5. Being so restless that it is hard to sit still 0   6. Becoming easily annoyed or irritable 0   7. Feeling afraid, as if something awful might happen 1   ADRIANA-7 Total Score 4   If you checked any problems, how difficult have they made it for you to do your work, take care of things at home, or get along with other people? Not difficult at all       Suicide Assessment Five-step Evaluation and Treatment (SAFE-T)          2024   General Health   How would you rate your overall physical health? Excellent   Feel stress (tense, anxious, or unable to sleep) Not at all         2024   Nutrition   Three or more servings of calcium each  day? Yes   Diet: Gluten-free/reduced   How many servings of fruit and vegetables per day? 4 or more   How many sweetened beverages each day? 0-1         2024   Exercise   Days per week of moderate/strenous exercise 7 days         2024   Social Factors   Frequency of gathering with friends or relatives Once a week   Worry food won't last until get money to buy more No   Food not last or not have enough money for food? No   Do you have housing?  Yes   Are you worried about losing your housing? No   Lack of transportation? No   Unable to get utilities (heat,electricity)? No         2024   Dental   Dentist two times every year? Yes         2024   TB Screening   Were you born outside of the [place]? No       Today's PHQ-9 Score:       2024    10:37 AM   PHQ-9 SCORE   PHQ-9 Total Score MyChart 1 (Minimal depression)   PHQ-9 Total Score 1         2024   Substance Use   Alcohol more than 3/day or more than 7/wk No   Do you use any other substances recreationally? No     Social History     Tobacco Use    Smoking status: Former     Current packs/day: 0.00     Average packs/day: 0.3 packs/day for 20.0 years (5.0 ttl pk-yrs)     Types: Cigarettes     Start date: 1998     Quit date: 2018     Years since quittin.8    Smokeless tobacco: Never   Vaping Use    Vaping status: Never Used   Substance Use Topics    Alcohol use: No    Drug use: No           2023   LAST FHS-7 RESULTS   1st degree relative breast or ovarian cancer No   Any relative bilateral breast cancer No   Any male have breast cancer No   Any ONE woman have BOTH breast AND ovarian cancer Yes   Any woman with breast cancer before 50yrs No   2 or more relatives with breast AND/OR ovarian cancer No   2 or more relatives with breast AND/OR bowel cancer No        Mammogram Screening - Mammogram every 1-2 years updated in Health Maintenance based on mutual decision making        2024   STI Screening   New sexual partner(s)  "since last STI/HIV test? No     History of abnormal Pap smear: Status post benign hysterectomy. Health Maintenance and Surgical History updated.    Vaginal paps due to history of hysterectomy. Some question about need for ongoing pap once normalizing. Will look into hysterectomy record/GYN record.        Latest Ref Rng & Units 3/6/2023     1:59 PM 2/17/2022    11:33 AM 9/14/2020     9:07 AM   PAP / HPV   PAP  Atypical squamous cells of undetermined significance (ASC-US)  Negative for Intraepithelial Lesion or Malignancy (NILM)     HPV 16 DNA Negative Negative  Negative  Positive    HPV 18 DNA Negative Negative  Negative  Negative    Other HR HPV Negative Negative  Negative  Positive      ASCVD Risk   The 10-year ASCVD risk score (Obinna BAUTISTA, et al., 2019) is: 0.7%    Values used to calculate the score:      Age: 44 years      Sex: Female      Is Non- : No      Diabetic: No      Tobacco smoker: No      Systolic Blood Pressure: 120 mmHg      Is BP treated: No      HDL Cholesterol: 67 mg/dL      Total Cholesterol: 216 mg/dL       Reviewed and updated as needed this visit by Provider   Tobacco  Allergies  Meds  Problems  Med Hx  Surg Hx  Fam Hx            Lab work is in process      Review of Systems  Constitutional, HEENT, cardiovascular, pulmonary, GI, , musculoskeletal, neuro, skin, endocrine and psych systems are negative, except as otherwise noted.     Objective    Exam  /72   Pulse 102   Temp 98.8  F (37.1  C) (Tympanic)   Resp 12   Ht 1.702 m (5' 7\")   Wt 70.3 kg (155 lb)   LMP 11/29/2020 (Exact Date)   SpO2 99%   BMI 24.28 kg/m     Estimated body mass index is 24.28 kg/m  as calculated from the following:    Height as of this encounter: 1.702 m (5' 7\").    Weight as of this encounter: 70.3 kg (155 lb).    Physical Exam  GENERAL: alert and no distress  EYES: Eyes grossly normal to inspection, PERRL and conjunctivae and sclerae normal  HENT: ear canals and " TM's normal, nose and mouth without ulcers or lesions  NECK: no adenopathy, no asymmetry, masses, or scars  RESP: lungs clear to auscultation - no rales, rhonchi or wheezes  CV: regular rate and rhythm, normal S1 S2, no S3 or S4, no murmur, click or rub, no peripheral edema  ABDOMEN: soft, nontender, no hepatosplenomegaly, no masses and bowel sounds normal   (female) w/bimanual: normal female external genitalia, normal urethral meatus, normal vaginal mucosa, and normal cervix/adnexa/uterus without masses or discharge  MS: no gross musculoskeletal defects noted, no edema  SKIN: no suspicious lesions or rashes  NEURO: Normal strength and tone, mentation intact and speech normal  PSYCH: mentation appears normal, affect normal/bright        Signed Electronically by: Elizabeth Donaldson, CNP

## 2024-04-19 LAB
BKR LAB AP GYN ADEQUACY: NORMAL
BKR LAB AP GYN INTERPRETATION: NORMAL
BKR LAB AP HPV REFLEX: NORMAL
BKR LAB AP PREVIOUS ABNL DX: NORMAL
BKR LAB AP PREVIOUS ABNORMAL: NORMAL
PATH REPORT.COMMENTS IMP SPEC: NORMAL
PATH REPORT.COMMENTS IMP SPEC: NORMAL
PATH REPORT.RELEVANT HX SPEC: NORMAL

## 2024-04-23 ENCOUNTER — PATIENT OUTREACH (OUTPATIENT)
Dept: FAMILY MEDICINE | Facility: CLINIC | Age: 44
End: 2024-04-23
Payer: COMMERCIAL

## 2024-07-14 ENCOUNTER — E-VISIT (OUTPATIENT)
Dept: URGENT CARE | Facility: CLINIC | Age: 44
End: 2024-07-14
Payer: COMMERCIAL

## 2024-07-14 DIAGNOSIS — R68.89 SUSPECTED LYME DISEASE: ICD-10-CM
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
## 2024-07-14 PROCEDURE — 99207 PR NON-BILLABLE SERV PER CHARTING: CPT | Performed by: PHYSICIAN ASSISTANT

## 2024-07-14 RX ORDER — DOXYCYCLINE 100 MG/1
100 CAPSULE ORAL 2 TIMES DAILY
Qty: 20 CAPSULE | Refills: 0 | Status: SHIPPED | OUTPATIENT
Start: 2024-07-14 | End: 2024-07-24

## 2025-02-20 NOTE — LETTER
December 1, 2021      Julisa Tillman  [address] [facility] [address]        Dear ,    This letter is to remind you that you are due for your follow-up Pap smear and Human Papillomavirus (HPV) test.    Please call [phone] to schedule your appointment at your earliest convenience.    If you have completed the appointment outside of the [facility] system, please have the records forwarded to our office. We will update your chart for your provider to review before your next annual wellness visit.     Thank you for choosing [facility]!      Sincerely,    Your [facility] Care Team  
no cold intolerance/no heat intolerance/no striae/no voice change

## 2025-02-27 ENCOUNTER — TRANSFERRED RECORDS (OUTPATIENT)
Dept: HEALTH INFORMATION MANAGEMENT | Facility: CLINIC | Age: 45
End: 2025-02-27

## 2025-03-14 ENCOUNTER — TRANSFERRED RECORDS (OUTPATIENT)
Dept: HEALTH INFORMATION MANAGEMENT | Facility: CLINIC | Age: 45
End: 2025-03-14

## 2025-04-03 ENCOUNTER — PATIENT OUTREACH (OUTPATIENT)
Dept: FAMILY MEDICINE | Facility: CLINIC | Age: 45
End: 2025-04-03

## 2025-04-03 DIAGNOSIS — D06.9 CIN III WITH SEVERE DYSPLASIA: Primary | ICD-10-CM

## 2025-04-17 ENCOUNTER — TRANSFERRED RECORDS (OUTPATIENT)
Dept: HEALTH INFORMATION MANAGEMENT | Facility: CLINIC | Age: 45
End: 2025-04-17

## 2025-05-25 ENCOUNTER — MYC MEDICAL ADVICE (OUTPATIENT)
Dept: FAMILY MEDICINE | Facility: CLINIC | Age: 45
End: 2025-05-25

## 2025-05-25 DIAGNOSIS — B37.31 YEAST INFECTION OF THE VAGINA: Primary | ICD-10-CM

## 2025-05-27 ENCOUNTER — TRANSFERRED RECORDS (OUTPATIENT)
Dept: HEALTH INFORMATION MANAGEMENT | Facility: CLINIC | Age: 45
End: 2025-05-27
Payer: COMMERCIAL

## 2025-05-27 RX ORDER — FLUCONAZOLE 150 MG/1
150 TABLET ORAL
Qty: 3 TABLET | Refills: 0 | Status: SHIPPED | OUTPATIENT
Start: 2025-05-27 | End: 2025-06-03

## 2025-05-27 NOTE — TELEPHONE ENCOUNTER
LOV 4/28/25    Would you send in abx? Or would you approve an e-visit?     Routing to provider to review and advise.     Chelsea Alvarado RN   [facility]

## 2025-06-01 ENCOUNTER — TRANSFERRED RECORDS (OUTPATIENT)
Dept: HEALTH INFORMATION MANAGEMENT | Facility: CLINIC | Age: 45
End: 2025-06-01
Payer: COMMERCIAL

## 2025-06-26 ENCOUNTER — TRANSFERRED RECORDS (OUTPATIENT)
Dept: HEALTH INFORMATION MANAGEMENT | Facility: CLINIC | Age: 45
End: 2025-06-26
Payer: COMMERCIAL

## 2025-06-28 ENCOUNTER — HEALTH MAINTENANCE LETTER (OUTPATIENT)
Age: 45
End: 2025-06-28

## 2025-07-17 ENCOUNTER — OFFICE VISIT (OUTPATIENT)
Dept: FAMILY MEDICINE | Facility: CLINIC | Age: 45
End: 2025-07-17
Payer: COMMERCIAL

## 2025-07-17 VITALS
BODY MASS INDEX: 26.63 KG/M2 | WEIGHT: 170 LBS | OXYGEN SATURATION: 99 % | TEMPERATURE: 96.4 F | SYSTOLIC BLOOD PRESSURE: 118 MMHG | HEART RATE: 121 BPM | DIASTOLIC BLOOD PRESSURE: 80 MMHG | RESPIRATION RATE: 18 BRPM

## 2025-07-17 DIAGNOSIS — Z13.0 SCREENING FOR DEFICIENCY ANEMIA: ICD-10-CM

## 2025-07-17 DIAGNOSIS — Z13.21 ENCOUNTER FOR VITAMIN DEFICIENCY SCREENING: ICD-10-CM

## 2025-07-17 DIAGNOSIS — Z13.220 SCREENING CHOLESTEROL LEVEL: ICD-10-CM

## 2025-07-17 DIAGNOSIS — Z13.1 SCREENING FOR DIABETES MELLITUS: ICD-10-CM

## 2025-07-17 DIAGNOSIS — Z12.4 CERVICAL CANCER SCREENING: ICD-10-CM

## 2025-07-17 DIAGNOSIS — Z13.29 THYROID DISORDER SCREENING: ICD-10-CM

## 2025-07-17 DIAGNOSIS — Z00.00 ROUTINE GENERAL MEDICAL EXAMINATION AT A HEALTH CARE FACILITY: Primary | ICD-10-CM

## 2025-07-17 DIAGNOSIS — N95.1 MENOPAUSAL SYNDROME (HOT FLASHES): ICD-10-CM

## 2025-07-17 RX ORDER — ESTRADIOL 0.04 MG/D
1 PATCH TRANSDERMAL WEEKLY
Qty: 4 PATCH | Refills: 2 | Status: SHIPPED | OUTPATIENT
Start: 2025-07-17

## 2025-07-17 SDOH — HEALTH STABILITY: PHYSICAL HEALTH: ON AVERAGE, HOW MANY DAYS PER WEEK DO YOU ENGAGE IN MODERATE TO STRENUOUS EXERCISE (LIKE A BRISK WALK)?: 3 DAYS

## 2025-07-17 ASSESSMENT — SOCIAL DETERMINANTS OF HEALTH (SDOH): HOW OFTEN DO YOU GET TOGETHER WITH FRIENDS OR RELATIVES?: MORE THAN THREE TIMES A WEEK

## 2025-07-17 ASSESSMENT — PATIENT HEALTH QUESTIONNAIRE - PHQ9
SUM OF ALL RESPONSES TO PHQ QUESTIONS 1-9: 3
10. IF YOU CHECKED OFF ANY PROBLEMS, HOW DIFFICULT HAVE THESE PROBLEMS MADE IT FOR YOU TO DO YOUR WORK, TAKE CARE OF THINGS AT HOME, OR GET ALONG WITH OTHER PEOPLE: NOT DIFFICULT AT ALL
SUM OF ALL RESPONSES TO PHQ QUESTIONS 1-9: 3

## 2025-07-17 NOTE — PATIENT INSTRUCTIONS
Patient Education   Preventive Care Advice   This is general advice given by our system to help you stay healthy. However, your care team may have specific advice just for you. Please talk to your care team about your preventive care needs.  Nutrition  Eat 5 or more servings of fruits and vegetables each day.  Try wheat bread, brown rice and whole grain pasta (instead of white bread, rice, and pasta).  Get enough calcium and vitamin D. Check the label on foods and aim for 100% of the RDA (recommended daily allowance).  Lifestyle  Exercise at least 150 minutes each week  (30 minutes a day, 5 days a week).  Do muscle strengthening activities 2 days a week. These help control your weight and prevent disease.  No smoking.  Wear sunscreen to prevent skin cancer.  Have a dental exam and cleaning every 6 months.  Yearly exams  See your health care team every year to talk about:  Any changes in your health.  Any medicines your care team has prescribed.  Preventive care, family planning, and ways to prevent chronic diseases.  Shots (vaccines)   HPV shots (up to age 26), if you've never had them before.  Hepatitis B shots (up to age 59), if you've never had them before.  COVID-19 shot: Get this shot when it's due.  Flu shot: Get a flu shot every year.  Tetanus shot: Get a tetanus shot every 10 years.  Pneumococcal, hepatitis A, and RSV shots: Ask your care team if you need these based on your risk.  Shingles shot (for age 50 and up)  General health tests  Diabetes screening:  Starting at age 35, Get screened for diabetes at least every 3 years.  If you are younger than age 35, ask your care team if you should be screened for diabetes.  Cholesterol test: At age 39, start having a cholesterol test every 5 years, or more often if advised.  Bone density scan (DEXA): At age 50, ask your care team if you should have this scan for osteoporosis (brittle bones).  Hepatitis C: Get tested at least once in your life.  STIs (sexually  transmitted infections)  Before age 24: Ask your care team if you should be screened for STIs.  After age 24: Get screened for STIs if you're at risk. You are at risk for STIs (including HIV) if:  You are sexually active with more than one person.  You don't use condoms every time.  You or a partner was diagnosed with a sexually transmitted infection.  If you are at risk for HIV, ask about PrEP medicine to prevent HIV.  Get tested for HIV at least once in your life, whether you are at risk for HIV or not.  Cancer screening tests  Cervical cancer screening: If you have a cervix, begin getting regular cervical cancer screening tests starting at age 21.  Breast cancer scan (mammogram): If you've ever had breasts, begin having regular mammograms starting at age 40. This is a scan to check for breast cancer.  Colon cancer screening: It is important to start screening for colon cancer at age 45.  Have a colonoscopy test every 10 years (or more often if you're at risk) Or, ask your provider about stool tests like a FIT test every year or Cologuard test every 3 years.  To learn more about your testing options, visit:   .  For help making a decision, visit:   https://bit.ly/qp59727.  Prostate cancer screening test: If you have a prostate, ask your care team if a prostate cancer screening test (PSA) at age 55 is right for you.  Lung cancer screening: If you are a current or former smoker ages 50 to 80, ask your care team if ongoing lung cancer screenings are right for you.  For informational purposes only. Not to replace the advice of your health care provider. Copyright   2023 [employer]. All rights reserved. Clinically reviewed by the [facility] Transitions Program. Involver 210059 - REV 01/24.

## 2025-07-17 NOTE — PROGRESS NOTES
"Preventive Care Visit  [facility]  Elizabeth Donaldson CNP, Nurse Practitioner - Family  Jul 17, 2025  {Provider  Link to [facility] :441907}    Assessment & Plan     Routine general medical examination at a health care facility  ***    Cervical cancer screening  ***  - HPV and Gynecologic Cytology Panel - Recommended Age 30 - 65 Years  - HPV and Gynecologic Cytology Panel - Recommended Age 30-65 Years    Menopausal syndrome (hot flashes)  ***  - estradiol (CLIMARA) 0.0375 MG/24HR weekly patch  Dispense: 4 patch; Refill: 2    {Patient advised of split billing (Optional):791843}    BMI  Estimated body mass index is 26.63 kg/m  as calculated from the following:    Height as of 4/28/25: 1.702 m (5' 7\").    Weight as of this encounter: 77.1 kg (170 lb).   {Weight Management Plan needed for ACO:562386}    Counseling  Appropriate preventive services were addressed with this patient via screening, questionnaire, or discussion as appropriate for fall prevention, nutrition, physical activity, Tobacco-use cessation, social engagement, weight loss and cognition.  Checklist reviewing preventive services available has been given to the patient.  Reviewed patient's diet, addressing concerns and/or questions.   She is at risk for lack of exercise and has been provided with information to increase physical activity for the benefit of her well-being.   {FEMALE COUNSELING MESSAGES (Optional):031687::\"Reviewed preventive health counseling, as reflected in patient instructions\"}    {Follow-up (Optional):948227}    Parker Egan is a 45 year old, presenting for the following:  Physical        7/17/2025     1:51 PM   Additional Questions   Roomed by Tory BENITEZ  ***     Depression and Anxiety   How are you doing with your depression since your last visit? No change  How are you doing with your anxiety since your last visit?  No change  Are you having other symptoms that might be associated with " depression or anxiety? No  Have you had a significant life event? No   Do you have any concerns with your use of alcohol or other drugs? No    Social History     Tobacco Use    Smoking status: Former     Current packs/day: 0.00     Average packs/day: 0.3 packs/day for 20.0 years (5.0 ttl pk-yrs)     Types: Cigarettes     Start date: 1998     Quit date: 2018     Years since quittin.0     Passive exposure: Past    Smokeless tobacco: Never   Vaping Use    Vaping status: Never Used   Substance Use Topics    Alcohol use: No    Drug use: No         3/6/2023     1:29 PM 2024    10:37 AM 2025     1:47 PM   PHQ   PHQ-9 Total Score 0 1 3    Q9: Thoughts of better off dead/self-harm past 2 weeks Not at all  Not at all Not at all       Patient-reported    Proxy-reported         2019    11:42 AM 2020     4:33 PM 2022    12:19 PM   ADRIANA-7 SCORE   Total Score 17 0 4     {Last PHQ9 or GAD7 Responses (Optional):573391}    Suicide Assessment Five-step Evaluation and Treatment (SAFE-T)  {Provider  Link to Depression Care Package SmartSet :369374}  {additonal problems for provider to add (Optional):905046}  Advance Care Planning  {The storyboard will display whether the patient has ACP docs on file. Hover over the Code section in the storyboard to access the ACP documents. :428114}  {(AWV REQUIRED) Advance Care Planning Reviewed:711149}        2025   General Health   How would you rate your overall physical health? Good   Feel stress (tense, anxious, or unable to sleep) Only a little   (!) STRESS CONCERN      2025   Nutrition   Three or more servings of calcium each day? Yes   Diet: Gluten-free/reduced   How many servings of fruit and vegetables per day? 4 or more   How many sweetened beverages each day? 0-1         2025   Exercise   Days per week of moderate/strenous exercise 3 days         2025   Social Factors   Frequency of gathering with friends or relatives More than  three times a week   Worry food won't last until get money to buy more No   Food not last or not have enough money for food? No   Do you have housing? (Housing is defined as stable permanent housing and does not include staying outside in a car, in a tent, in an abandoned building, in an overnight shelter, or couch-surfing.) Yes   Are you worried about losing your housing? No   Lack of transportation? No   Unable to get utilities (heat,electricity)? No         2025   Dental   Dentist two times every year? Yes       Today's PHQ-9 Score:       2025     1:47 PM   PHQ-9 SCORE   PHQ-9 Total Score MyChart 3 (Minimal depression)   PHQ-9 Total Score 3        Patient-reported         2025   Substance Use   Alcohol more than 3/day or more than 7/wk No   Do you use any other substances recreationally? No     Social History     Tobacco Use    Smoking status: Former     Current packs/day: 0.00     Average packs/day: 0.3 packs/day for 20.0 years (5.0 ttl pk-yrs)     Types: Cigarettes     Start date: 1998     Quit date: 2018     Years since quittin.0     Passive exposure: Past    Smokeless tobacco: Never   Vaping Use    Vaping status: Never Used   Substance Use Topics    Alcohol use: No    Drug use: No     {Provider  If there are gaps in the social history shown above, please follow the link to update and then refresh the note Link to Social and Substance History :996300}      2023   LAST FHS-7 RESULTS   1st degree relative breast or ovarian cancer No   Any relative bilateral breast cancer No   Any male have breast cancer No   Any ONE woman have BOTH breast AND ovarian cancer Yes   Any woman with breast cancer before 50yrs No   2 or more relatives with breast AND/OR ovarian cancer No   2 or more relatives with breast AND/OR bowel cancer No     {If any of the questions to the FHS7 are answered yes, consider referral for genetic counseling.    Additional indications for genetic referral include  "personal history of breast or ovarian cancer, genetic mutation in 1st degree relative which increases risk of breast cancer including BRCA1, BRCA2, PRINCE, PALB 2, TP53, CHEK2, PTEN, CDH1, STK11 (per ACS) and/or 1st degree relative with history of pancreatic or high-risk prostate cancer (per NCCN):287654}   {Mammogram Decision Support (Optional):865500}        7/17/2025   STI Screening   New sexual partner(s) since last STI/HIV test? No     History of abnormal Pap smear: { :730329}        Latest Ref Rng & Units 4/16/2024    11:06 AM 3/6/2023     1:59 PM 2/17/2022    11:33 AM   PAP / HPV   PAP  Negative for Intraepithelial Lesion or Malignancy (NILM)  Atypical squamous cells of undetermined significance (ASC-US)  Negative for Intraepithelial Lesion or Malignancy (NILM)    HPV 16 DNA Negative Negative  Negative  Negative    HPV 18 DNA Negative Negative  Negative  Negative    Other HR HPV Negative Negative  Negative  Negative      ASCVD Risk   The 10-year ASCVD risk score (Obinna BAUTISTA, et al., 2019) is: 0.7%    Values used to calculate the score:      Age: 45 years      Sex: Female      Is Non- : No      Diabetic: No      Tobacco smoker: No      Systolic Blood Pressure: 118 mmHg      Is BP treated: No      HDL Cholesterol: 67 mg/dL      Total Cholesterol: 216 mg/dL    {Provider  REQUIRED FOR AWV Use the storyboard to review patient history, after sections have been marked as reviewed, refresh note to capture documentation:980556}   Reviewed and updated as needed this visit by Provider                    {HISTORY OPTIONS (Optional):681482}    {ROS Picklists (Optional):510010}     Objective    Exam  /80   Pulse (!) 121   Temp (!) 96.4  F (35.8  C) (Tympanic)   Resp 18   Wt 77.1 kg (170 lb)   LMP 11/29/2020 (Exact Date)   SpO2 99%   BMI 26.63 kg/m     Estimated body mass index is 26.63 kg/m  as calculated from the following:    Height as of 4/28/25: 1.702 m (5' 7\").    Weight as " of this encounter: 77.1 kg (170 lb).    Physical Exam  {Exam Choices (Optional):485886}        Signed Electronically by: Elizabeth Donaldson CNP  {Email feedback regarding this note to [email or web address]   :604714}  Answers submitted by the patient for this visit:  Patient Health Questionnaire (Submitted on 7/17/2025)  If you checked off any problems, how difficult have these problems made it for you to do your work, take care of things at home, or get along with other people?: Not difficult at all  PHQ9 TOTAL SCORE: 3

## 2025-07-22 LAB
BKR AP ASSOCIATED HPV REPORT: NORMAL
BKR LAB AP GYN ADEQUACY: NORMAL
BKR LAB AP GYN INTERPRETATION: NORMAL
BKR LAB AP PREVIOUS ABNL DX: NORMAL
BKR LAB AP PREVIOUS ABNORMAL: NORMAL
PATH REPORT.COMMENTS IMP SPEC: NORMAL
PATH REPORT.COMMENTS IMP SPEC: NORMAL
PATH REPORT.RELEVANT HX SPEC: NORMAL

## 2025-07-28 ENCOUNTER — LAB (OUTPATIENT)
Dept: LAB | Facility: CLINIC | Age: 45
End: 2025-07-28
Payer: COMMERCIAL

## 2025-07-28 DIAGNOSIS — Z13.1 SCREENING FOR DIABETES MELLITUS: ICD-10-CM

## 2025-07-28 DIAGNOSIS — Z13.220 SCREENING CHOLESTEROL LEVEL: ICD-10-CM

## 2025-07-28 DIAGNOSIS — Z13.21 ENCOUNTER FOR VITAMIN DEFICIENCY SCREENING: ICD-10-CM

## 2025-07-28 DIAGNOSIS — Z13.0 SCREENING FOR DEFICIENCY ANEMIA: ICD-10-CM

## 2025-07-28 DIAGNOSIS — Z13.29 THYROID DISORDER SCREENING: ICD-10-CM

## 2025-07-28 LAB
ERYTHROCYTE [DISTWIDTH] IN BLOOD BY AUTOMATED COUNT: 11.6 % (ref 10–15)
HCT VFR BLD AUTO: 39.1 % (ref 35–47)
HGB BLD-MCNC: 13.4 G/DL (ref 11.7–15.7)
MCH RBC QN AUTO: 31.4 PG (ref 26.5–33)
MCHC RBC AUTO-ENTMCNC: 34.3 G/DL (ref 31.5–36.5)
MCV RBC AUTO: 92 FL (ref 78–100)
PLATELET # BLD AUTO: 347 10E3/UL (ref 150–450)
RBC # BLD AUTO: 4.27 10E6/UL (ref 3.8–5.2)
WBC # BLD AUTO: 7.6 10E3/UL (ref 4–11)

## 2025-07-28 PROCEDURE — 82306 VITAMIN D 25 HYDROXY: CPT

## 2025-07-28 PROCEDURE — 84443 ASSAY THYROID STIM HORMONE: CPT

## 2025-07-28 PROCEDURE — 80061 LIPID PANEL: CPT

## 2025-07-28 PROCEDURE — 85027 COMPLETE CBC AUTOMATED: CPT

## 2025-07-28 PROCEDURE — 80053 COMPREHEN METABOLIC PANEL: CPT

## 2025-07-28 PROCEDURE — 36415 COLL VENOUS BLD VENIPUNCTURE: CPT

## 2025-07-29 LAB
ALBUMIN SERPL BCG-MCNC: 4.7 G/DL (ref 3.5–5.2)
ALP SERPL-CCNC: 65 U/L (ref 40–150)
ALT SERPL W P-5'-P-CCNC: 13 U/L (ref 0–50)
ANION GAP SERPL CALCULATED.3IONS-SCNC: 12 MMOL/L (ref 7–15)
AST SERPL W P-5'-P-CCNC: 24 U/L (ref 0–45)
BILIRUB SERPL-MCNC: 0.2 MG/DL
BUN SERPL-MCNC: 7.8 MG/DL (ref 6–20)
CALCIUM SERPL-MCNC: 9.4 MG/DL (ref 8.8–10.4)
CHLORIDE SERPL-SCNC: 99 MMOL/L (ref 98–107)
CHOLEST SERPL-MCNC: 287 MG/DL
CREAT SERPL-MCNC: 0.72 MG/DL (ref 0.51–0.95)
EGFRCR SERPLBLD CKD-EPI 2021: >90 ML/MIN/1.73M2
FASTING STATUS PATIENT QL REPORTED: YES
FASTING STATUS PATIENT QL REPORTED: YES
GLUCOSE SERPL-MCNC: 94 MG/DL (ref 70–99)
HCO3 SERPL-SCNC: 24 MMOL/L (ref 22–29)
HDLC SERPL-MCNC: 71 MG/DL
LDLC SERPL CALC-MCNC: 186 MG/DL
NONHDLC SERPL-MCNC: 216 MG/DL
POTASSIUM SERPL-SCNC: 4.6 MMOL/L (ref 3.4–5.3)
PROT SERPL-MCNC: 7.2 G/DL (ref 6.4–8.3)
SODIUM SERPL-SCNC: 135 MMOL/L (ref 135–145)
TRIGL SERPL-MCNC: 152 MG/DL
TSH SERPL DL<=0.005 MIU/L-ACNC: 3.11 UIU/ML (ref 0.3–4.2)
VIT D+METAB SERPL-MCNC: 34 NG/ML (ref 20–50)

## 2025-08-07 ENCOUNTER — MYC MEDICAL ADVICE (OUTPATIENT)
Dept: FAMILY MEDICINE | Facility: CLINIC | Age: 45
End: 2025-08-07
Payer: COMMERCIAL

## 2025-08-07 DIAGNOSIS — N95.1 MENOPAUSAL SYNDROME (HOT FLASHES): ICD-10-CM

## 2025-08-07 RX ORDER — ESTRADIOL 0.04 MG/D
PATCH TRANSDERMAL
Qty: 12 PATCH | Refills: 1 | Status: SHIPPED | OUTPATIENT
Start: 2025-08-07

## (undated) DEVICE — PAD CHUX UNDERPAD 30X36" P3036C

## (undated) DEVICE — BAG CLEAR TRASH 1.3M 39X33" P4040C

## (undated) DEVICE — GLOVE PROTEXIS MICRO 7.0  2D73PM70

## (undated) DEVICE — LINEN HALF SHEET 5512

## (undated) DEVICE — CATH FOLEY 16FR 5ML LUBRICATH LATEX 0165L16

## (undated) DEVICE — ENDO TROCAR SLEEVE KII ADV FIXATION 05X100MM CFS02

## (undated) DEVICE — SOL WATER IRRIG 1000ML BOTTLE 2F7114

## (undated) DEVICE — SYR 03ML LL W/O NDL

## (undated) DEVICE — ADH SKIN CLOSURE PREMIERPRO EXOFIN 1.0ML 3470

## (undated) DEVICE — GLOVE PROTEXIS MICRO 8.0  2D73PM80

## (undated) DEVICE — SU WND CLOSURE VLOC 180 ABS 0 9" GS-21 VLOCL0346

## (undated) DEVICE — PROTECTOR ARM ONE-STEP TRENDELENBURG 40418

## (undated) DEVICE — GOWN IMPERVIOUS SPECIALTY XLG/XLONG 32474

## (undated) DEVICE — LINEN FULL SHEET 5511

## (undated) DEVICE — ENDO TROCAR FIRST ENTRY KII FIOS Z-THRD 12X100MM CTF73

## (undated) DEVICE — SU VICRYL 0 UR-6 27" J603H

## (undated) DEVICE — KIT PATIENT POSITIONING PIGAZZI LATEX FREE 40580

## (undated) DEVICE — PACK LAP HYST RIDGES

## (undated) DEVICE — EVAC SYSTEM CLEAR FLOW SC082500

## (undated) DEVICE — SYR 10ML SLIP TIP W/O NDL 303134

## (undated) DEVICE — GLOVE PROTEXIS MICRO 7.5  2D73PM75

## (undated) DEVICE — SOL NACL 0.9% INJ 1000ML BAG 2B1324X

## (undated) DEVICE — KIT ENDO TURNOVER/PROCEDURE W/CLEAN A SCOPE LINERS 103888

## (undated) DEVICE — LINEN ORTHO ACL PACK 5447

## (undated) DEVICE — SU MONOCRYL 4-0 PS-2 18" UND Y496G

## (undated) DEVICE — SU VICRYL 0 CT-2 27" J334H

## (undated) DEVICE — NDL INSUFFLATION 13GA 120MM C2201

## (undated) DEVICE — GLOVE PROTEXIS W/NEU-THERA 6.5  2D73TE65

## (undated) DEVICE — TUBING IRRIG CYSTO/BLADDER SET 81" LF 2C4040

## (undated) DEVICE — ESU HANDPIECE THUNDERBEAT FRONT ACT 5MMX35CM TB-0535FCS

## (undated) DEVICE — DRAPE SLEEVE 599

## (undated) DEVICE — SOL NACL 0.9% IRRIG 1000ML BOTTLE 2F7124

## (undated) DEVICE — SUCTION CANISTER MEDIVAC LINER 3000ML W/LID 65651-530

## (undated) DEVICE — LINEN POUCH DBL 5427

## (undated) DEVICE — Device

## (undated) DEVICE — ENDO TROCAR FIRST ENTRY KII FIOS ADV FIX 05X100MM CFF03

## (undated) DEVICE — RETR ELEV / UTERINE MANIPULATOR V-CARE LG CUP 60-6085-202A

## (undated) RX ORDER — NEOSTIGMINE METHYLSULFATE 1 MG/ML
VIAL (ML) INJECTION
Status: DISPENSED
Start: 2020-12-15

## (undated) RX ORDER — PROPOFOL 10 MG/ML
INJECTION, EMULSION INTRAVENOUS
Status: DISPENSED
Start: 2020-12-15

## (undated) RX ORDER — FENTANYL CITRATE 50 UG/ML
INJECTION, SOLUTION INTRAMUSCULAR; INTRAVENOUS
Status: DISPENSED
Start: 2023-05-19

## (undated) RX ORDER — SIMETHICONE 40MG/0.6ML
SUSPENSION, DROPS(FINAL DOSAGE FORM)(ML) ORAL
Status: DISPENSED
Start: 2023-05-19

## (undated) RX ORDER — FENTANYL CITRATE 50 UG/ML
INJECTION, SOLUTION INTRAMUSCULAR; INTRAVENOUS
Status: DISPENSED
Start: 2020-12-15

## (undated) RX ORDER — ONDANSETRON 2 MG/ML
INJECTION INTRAMUSCULAR; INTRAVENOUS
Status: DISPENSED
Start: 2020-12-15

## (undated) RX ORDER — DEXAMETHASONE SODIUM PHOSPHATE 4 MG/ML
INJECTION, SOLUTION INTRA-ARTICULAR; INTRALESIONAL; INTRAMUSCULAR; INTRAVENOUS; SOFT TISSUE
Status: DISPENSED
Start: 2020-12-15

## (undated) RX ORDER — SCOLOPAMINE TRANSDERMAL SYSTEM 1 MG/1
PATCH, EXTENDED RELEASE TRANSDERMAL
Status: DISPENSED
Start: 2020-12-15

## (undated) RX ORDER — ACETAMINOPHEN 325 MG/1
TABLET ORAL
Status: DISPENSED
Start: 2020-12-15

## (undated) RX ORDER — ONDANSETRON 2 MG/ML
INJECTION INTRAMUSCULAR; INTRAVENOUS
Status: DISPENSED
Start: 2023-05-19

## (undated) RX ORDER — BUPIVACAINE HYDROCHLORIDE 2.5 MG/ML
INJECTION, SOLUTION EPIDURAL; INFILTRATION; INTRACAUDAL
Status: DISPENSED
Start: 2020-12-15

## (undated) RX ORDER — FUROSEMIDE 10 MG/ML
INJECTION INTRAMUSCULAR; INTRAVENOUS
Status: DISPENSED
Start: 2020-12-15

## (undated) RX ORDER — BUPIVACAINE HYDROCHLORIDE AND EPINEPHRINE 5; 5 MG/ML; UG/ML
INJECTION, SOLUTION EPIDURAL; INTRACAUDAL; PERINEURAL
Status: DISPENSED
Start: 2020-12-15

## (undated) RX ORDER — DIPHENHYDRAMINE HYDROCHLORIDE 50 MG/ML
INJECTION INTRAMUSCULAR; INTRAVENOUS
Status: DISPENSED
Start: 2023-05-19

## (undated) RX ORDER — KETOROLAC TROMETHAMINE 30 MG/ML
INJECTION, SOLUTION INTRAMUSCULAR; INTRAVENOUS
Status: DISPENSED
Start: 2020-12-15

## (undated) RX ORDER — LIDOCAINE HYDROCHLORIDE 10 MG/ML
INJECTION, SOLUTION EPIDURAL; INFILTRATION; INTRACAUDAL; PERINEURAL
Status: DISPENSED
Start: 2020-12-15

## (undated) RX ORDER — GLYCOPYRROLATE 0.2 MG/ML
INJECTION INTRAMUSCULAR; INTRAVENOUS
Status: DISPENSED
Start: 2020-12-15

## (undated) RX ORDER — CEFAZOLIN SODIUM 2 G/100ML
INJECTION, SOLUTION INTRAVENOUS
Status: DISPENSED
Start: 2020-12-15